# Patient Record
Sex: FEMALE | Race: WHITE | NOT HISPANIC OR LATINO | URBAN - METROPOLITAN AREA
[De-identification: names, ages, dates, MRNs, and addresses within clinical notes are randomized per-mention and may not be internally consistent; named-entity substitution may affect disease eponyms.]

---

## 2023-04-04 LAB
POC CREATININE: 0.7 MG/DL (ref 0.6–1.3)
POCT GFR - DATA CONVERSION: >60

## 2023-09-09 PROBLEM — R59.0 MEDIASTINAL LYMPHADENOPATHY: Status: ACTIVE | Noted: 2023-09-09

## 2023-09-09 PROBLEM — S09.90XA INJURY OF HEAD: Status: ACTIVE | Noted: 2023-09-09

## 2023-09-09 PROBLEM — K21.9 GASTROESOPHAGEAL REFLUX DISEASE WITHOUT ESOPHAGITIS: Status: ACTIVE | Noted: 2023-09-09

## 2023-09-09 PROBLEM — S22.39XA FRACTURE OF RIB: Status: ACTIVE | Noted: 2023-09-09

## 2023-09-09 PROBLEM — R74.01 TRANSAMINITIS: Status: ACTIVE | Noted: 2023-09-09

## 2023-09-09 PROBLEM — R10.9 ABDOMINAL PAIN: Status: ACTIVE | Noted: 2023-09-09

## 2023-09-09 PROBLEM — S72.002A CLOSED FRACTURE OF NECK OF LEFT FEMUR (MULTI): Status: ACTIVE | Noted: 2023-09-09

## 2023-09-09 PROBLEM — F10.929 ALCOHOL INTOXICATION (CMS-HCC): Status: ACTIVE | Noted: 2023-09-09
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
## 2023-09-09 PROBLEM — F33.2 MAJOR DEPRESSIVE DISORDER, RECURRENT SEVERE WITHOUT PSYCHOTIC FEATURES (MULTI): Status: ACTIVE | Noted: 2023-09-09

## 2023-09-09 PROBLEM — K65.1 INTRA-ABDOMINAL ABSCESS (MULTI): Status: ACTIVE | Noted: 2023-09-09

## 2023-09-09 PROBLEM — F32.A DEPRESSION: Status: ACTIVE | Noted: 2023-09-09

## 2023-09-09 PROBLEM — R17 JAUNDICE: Status: ACTIVE | Noted: 2023-09-09

## 2023-09-09 PROBLEM — Z98.890 S/P ERCP: Status: ACTIVE | Noted: 2023-09-09

## 2023-09-09 PROBLEM — G43.019 INTRACTABLE MIGRAINE WITHOUT AURA AND WITHOUT STATUS MIGRAINOSUS: Status: ACTIVE | Noted: 2023-09-09

## 2023-09-09 PROBLEM — R40.1 CLOUDED CONSCIOUSNESS: Status: ACTIVE | Noted: 2023-09-09

## 2023-09-09 PROBLEM — K65.9 PERITONITIS (MULTI): Status: ACTIVE | Noted: 2023-09-09

## 2023-09-09 PROBLEM — K83.1 BILIARY STRICTURE (CMS-HCC): Status: ACTIVE | Noted: 2023-09-09

## 2023-09-09 PROBLEM — K21.9 GASTROESOPHAGEAL REFLUX DISEASE: Status: ACTIVE | Noted: 2023-09-09

## 2023-09-09 PROBLEM — R06.00 DYSPNEA: Status: ACTIVE | Noted: 2023-09-09

## 2023-09-09 PROBLEM — M10.9 GOUTY ARTHROPATHY: Status: ACTIVE | Noted: 2023-09-09

## 2023-09-09 PROBLEM — K90.81: Status: ACTIVE | Noted: 2023-09-09

## 2023-09-09 PROBLEM — S72.002D: Status: ACTIVE | Noted: 2023-09-09

## 2023-09-09 PROBLEM — H25.10 NUCLEAR SENILE CATARACT: Status: ACTIVE | Noted: 2023-09-09

## 2023-09-09 PROBLEM — R53.1 ASTHENIA: Status: ACTIVE | Noted: 2023-09-09

## 2023-09-09 PROBLEM — K86.0 ALCOHOL-INDUCED CHRONIC PANCREATITIS (MULTI): Status: ACTIVE | Noted: 2023-09-09

## 2023-09-09 PROBLEM — R11.10 CHRONIC VOMITING: Status: ACTIVE | Noted: 2023-09-09

## 2023-09-09 PROBLEM — S06.0XAA CONCUSSION: Status: ACTIVE | Noted: 2023-09-09

## 2023-09-09 PROBLEM — G62.9 PERIPHERAL NEUROPATHY: Status: ACTIVE | Noted: 2023-09-09

## 2023-09-09 PROBLEM — Z91.89 AT RISK FOR BLEEDING: Status: ACTIVE | Noted: 2023-09-09

## 2023-09-09 PROBLEM — F33.1 MODERATE EPISODE OF RECURRENT MAJOR DEPRESSIVE DISORDER (MULTI): Status: ACTIVE | Noted: 2023-09-09

## 2023-09-09 PROBLEM — M25.559 HIP PAIN: Status: ACTIVE | Noted: 2023-09-09

## 2023-09-09 PROBLEM — K75.0 LIVER ABSCESS (HHS-HCC): Status: ACTIVE | Noted: 2023-09-09

## 2023-09-09 PROBLEM — M54.50 LOW BACK PAIN: Status: ACTIVE | Noted: 2023-09-09

## 2023-09-09 PROBLEM — E86.0 DEHYDRATION: Status: ACTIVE | Noted: 2023-09-09

## 2023-09-09 PROBLEM — G43.001 MIGRAINE WITHOUT AURA AND WITH STATUS MIGRAINOSUS, NOT INTRACTABLE: Status: ACTIVE | Noted: 2023-09-09

## 2023-09-09 PROBLEM — F10.930 ALCOHOL WITHDRAWAL SYNDROME WITHOUT COMPLICATION (MULTI): Status: ACTIVE | Noted: 2023-09-09

## 2023-09-09 PROBLEM — M81.0 AGE-RELATED OSTEOPOROSIS WITHOUT CURRENT PATHOLOGICAL FRACTURE: Status: ACTIVE | Noted: 2023-09-09

## 2023-09-09 PROBLEM — S72.009A FRACTURE OF NECK OF FEMUR (MULTI): Status: ACTIVE | Noted: 2023-09-09

## 2023-09-09 PROBLEM — S42.309A FRACTURE OF HUMERUS: Status: ACTIVE | Noted: 2023-09-09

## 2023-09-09 PROBLEM — K90.9 INTESTINAL MALABSORPTION, UNSPECIFIED (HHS-HCC): Status: ACTIVE | Noted: 2023-09-09

## 2023-09-09 PROBLEM — F51.01 PRIMARY INSOMNIA: Status: ACTIVE | Noted: 2023-09-09

## 2023-09-09 PROBLEM — S39.91XA GROIN INJURY: Status: ACTIVE | Noted: 2023-09-09

## 2023-09-09 PROBLEM — R87.810 CERVICAL HIGH RISK HUMAN PAPILLOMAVIRUS (HPV) DNA TEST POSITIVE: Status: ACTIVE | Noted: 2023-09-09

## 2023-09-09 PROBLEM — M75.51 BURSITIS OF RIGHT SHOULDER: Status: ACTIVE | Noted: 2023-09-09

## 2023-09-09 PROBLEM — K76.82 HEPATIC ENCEPHALOPATHY (MULTI): Status: ACTIVE | Noted: 2023-09-09

## 2023-09-09 PROBLEM — E27.9 DISORDER OF ADRENAL GLAND, UNSPECIFIED (MULTI): Status: ACTIVE | Noted: 2023-09-09

## 2023-09-09 PROBLEM — F17.200 TOBACCO DEPENDENCE SYNDROME: Status: ACTIVE | Noted: 2023-09-09

## 2023-09-09 PROBLEM — E87.6 HYPOKALEMIA: Status: ACTIVE | Noted: 2023-09-09

## 2023-09-09 PROBLEM — J44.9 COPD, MODERATE (MULTI): Status: ACTIVE | Noted: 2023-09-09

## 2023-09-09 PROBLEM — K83.1: Status: ACTIVE | Noted: 2023-09-09

## 2023-09-09 PROBLEM — I10 PRIMARY HYPERTENSION: Status: ACTIVE | Noted: 2023-09-09

## 2023-09-09 PROBLEM — R26.2 DIFFICULTY IN WALKING: Status: ACTIVE | Noted: 2023-09-09

## 2023-09-09 PROBLEM — E83.42 HYPOMAGNESEMIA: Status: ACTIVE | Noted: 2023-09-09

## 2023-09-09 PROBLEM — M25.511 PAIN IN JOINT OF RIGHT SHOULDER: Status: ACTIVE | Noted: 2023-09-09

## 2023-09-09 PROBLEM — G47.00 INSOMNIA: Status: ACTIVE | Noted: 2023-09-09

## 2023-09-09 PROBLEM — K83.1 CHOLESTASIS (CMS-HCC): Status: ACTIVE | Noted: 2023-09-09

## 2023-09-09 PROBLEM — E78.5 HYPERLIPIDEMIA: Status: ACTIVE | Noted: 2023-09-09

## 2023-09-09 PROBLEM — K83.1 COMMON BILE DUCT OBSTRUCTION (CMS-HCC): Status: ACTIVE | Noted: 2022-11-08

## 2023-09-09 PROBLEM — K86.1 CHRONIC PANCREATITIS (MULTI): Status: ACTIVE | Noted: 2023-09-09

## 2023-09-09 PROBLEM — M12.811 ROTATOR CUFF ARTHROPATHY OF RIGHT SHOULDER: Status: ACTIVE | Noted: 2023-09-09

## 2023-09-09 PROBLEM — S22.31XA CLOSED FRACTURE OF ONE RIB OF RIGHT SIDE: Status: ACTIVE | Noted: 2023-09-09

## 2023-09-09 PROBLEM — Z92.89 S/P ALCOHOL DETOXIFICATION: Status: ACTIVE | Noted: 2023-09-09

## 2023-09-09 PROBLEM — D72.829 LEUKOCYTOSIS: Status: ACTIVE | Noted: 2023-09-09

## 2023-09-09 PROBLEM — F10.10 ALCOHOL ABUSE: Status: ACTIVE | Noted: 2023-09-09

## 2023-09-09 PROBLEM — F17.210 CIGARETTE SMOKER: Status: ACTIVE | Noted: 2023-09-09

## 2023-09-09 PROBLEM — E53.8 B12 DEFICIENCY: Status: ACTIVE | Noted: 2023-09-09

## 2023-09-09 PROBLEM — M15.0 PRIMARY OSTEOARTHRITIS INVOLVING MULTIPLE JOINTS: Status: ACTIVE | Noted: 2023-09-09

## 2023-09-09 PROBLEM — K76.89 LIVER NODULE: Status: ACTIVE | Noted: 2023-09-09

## 2023-09-09 PROBLEM — F10.21 ALCOHOL DEPENDENCE, IN REMISSION (MULTI): Status: ACTIVE | Noted: 2023-09-09

## 2023-09-09 PROBLEM — L29.9 PRURITUS: Status: ACTIVE | Noted: 2023-09-09

## 2023-09-09 PROBLEM — R79.89 ABNORMAL LIVER FUNCTION TESTS: Status: ACTIVE | Noted: 2023-09-09

## 2023-09-09 PROBLEM — R63.0 ANOREXIA: Status: ACTIVE | Noted: 2023-09-09

## 2023-09-09 PROBLEM — J96.20 ACUTE-ON-CHRONIC RESPIRATORY FAILURE (MULTI): Status: ACTIVE | Noted: 2023-09-09

## 2023-09-09 PROBLEM — R07.81 RIB PAIN ON RIGHT SIDE: Status: ACTIVE | Noted: 2023-09-09

## 2023-09-09 PROBLEM — M10.9 GOUT: Status: ACTIVE | Noted: 2023-09-09

## 2023-09-09 PROBLEM — F41.9 ANXIETY: Status: ACTIVE | Noted: 2023-09-09

## 2023-09-09 PROBLEM — R17 ELEVATED BILIRUBIN: Status: ACTIVE | Noted: 2023-09-09

## 2023-09-09 PROBLEM — L29.9 PRURITIC DISORDER: Status: ACTIVE | Noted: 2023-09-09

## 2023-09-09 PROBLEM — R52 GENERALIZED PAIN: Status: ACTIVE | Noted: 2023-09-09

## 2023-09-09 PROBLEM — M15.9 PRIMARY OSTEOARTHRITIS INVOLVING MULTIPLE JOINTS: Status: ACTIVE | Noted: 2023-09-09

## 2023-09-09 PROBLEM — G47.33 OBSTRUCTIVE SLEEP APNEA SYNDROME: Status: ACTIVE | Noted: 2023-09-09

## 2023-09-09 RX ORDER — MIRTAZAPINE 15 MG/1
TABLET, FILM COATED ORAL
COMMUNITY
Start: 2023-01-30 | End: 2023-10-12 | Stop reason: SDUPTHER

## 2023-09-09 RX ORDER — POTASSIUM CHLORIDE 20 MEQ/1
20 TABLET, EXTENDED RELEASE ORAL 2 TIMES DAILY
COMMUNITY
End: 2023-10-12 | Stop reason: SDUPTHER

## 2023-09-09 RX ORDER — MULTIVITAMIN
TABLET ORAL
COMMUNITY
Start: 2013-05-16 | End: 2023-10-12 | Stop reason: ALTCHOICE

## 2023-09-09 RX ORDER — PAROXETINE HYDROCHLORIDE 40 MG/1
1 TABLET, FILM COATED ORAL DAILY
COMMUNITY
Start: 2021-10-01 | End: 2023-10-12 | Stop reason: SDUPTHER

## 2023-09-09 RX ORDER — ACETAMINOPHEN 500 MG
1 TABLET ORAL DAILY
COMMUNITY

## 2023-09-09 RX ORDER — PANCRELIPASE 24000; 76000; 120000 [USP'U]/1; [USP'U]/1; [USP'U]/1
CAPSULE, DELAYED RELEASE PELLETS ORAL
COMMUNITY
Start: 2020-03-06 | End: 2023-10-18

## 2023-09-09 RX ORDER — PANCRELIPASE 60000; 12000; 38000 [USP'U]/1; [USP'U]/1; [USP'U]/1
CAPSULE, DELAYED RELEASE PELLETS ORAL
COMMUNITY
Start: 2020-09-12

## 2023-09-09 RX ORDER — BUTALB/ACETAMINOPHEN/CAFFEINE 50-325-40
TABLET ORAL
COMMUNITY
Start: 2020-10-19

## 2023-09-09 RX ORDER — HYDROXYZINE HYDROCHLORIDE 25 MG/1
TABLET, FILM COATED ORAL
COMMUNITY
Start: 2021-09-17

## 2023-09-09 RX ORDER — PANTOPRAZOLE SODIUM 40 MG/1
TABLET, DELAYED RELEASE ORAL
COMMUNITY
Start: 2021-01-15 | End: 2023-10-12 | Stop reason: SDUPTHER

## 2023-09-09 RX ORDER — ONDANSETRON 4 MG/1
TABLET, FILM COATED ORAL
COMMUNITY
End: 2023-10-12 | Stop reason: ALTCHOICE

## 2023-09-09 RX ORDER — LANOLIN ALCOHOL/MO/W.PET/CERES
CREAM (GRAM) TOPICAL
COMMUNITY
Start: 2019-04-01 | End: 2023-10-12 | Stop reason: SDUPTHER

## 2023-09-09 RX ORDER — TRAZODONE HYDROCHLORIDE 100 MG/1
300 TABLET ORAL DAILY
COMMUNITY
Start: 2020-10-08 | End: 2023-10-12 | Stop reason: SDUPTHER

## 2023-09-09 RX ORDER — MIRTAZAPINE 45 MG/1
TABLET, ORALLY DISINTEGRATING ORAL
COMMUNITY
Start: 2021-04-28 | End: 2023-10-12 | Stop reason: ALTCHOICE

## 2023-09-09 RX ORDER — SUCRALFATE 1 G/1
TABLET ORAL
COMMUNITY
Start: 2023-01-30 | End: 2023-10-24

## 2023-10-03 ENCOUNTER — HOSPITAL ENCOUNTER (EMERGENCY)
Facility: HOSPITAL | Age: 68
Discharge: HOME | End: 2023-10-04
Attending: STUDENT IN AN ORGANIZED HEALTH CARE EDUCATION/TRAINING PROGRAM
Payer: COMMERCIAL

## 2023-10-03 ENCOUNTER — APPOINTMENT (OUTPATIENT)
Dept: RADIOLOGY | Facility: HOSPITAL | Age: 68
End: 2023-10-03
Payer: COMMERCIAL

## 2023-10-03 VITALS
BODY MASS INDEX: 16.92 KG/M2 | OXYGEN SATURATION: 98 % | HEIGHT: 60 IN | DIASTOLIC BLOOD PRESSURE: 68 MMHG | SYSTOLIC BLOOD PRESSURE: 139 MMHG | HEART RATE: 62 BPM | TEMPERATURE: 98.6 F | RESPIRATION RATE: 16 BRPM | WEIGHT: 86.2 LBS

## 2023-10-03 DIAGNOSIS — M54.2 ACUTE NECK PAIN: Primary | ICD-10-CM

## 2023-10-03 PROCEDURE — 72040 X-RAY EXAM NECK SPINE 2-3 VW: CPT | Mod: FY

## 2023-10-03 PROCEDURE — 2500000001 HC RX 250 WO HCPCS SELF ADMINISTERED DRUGS (ALT 637 FOR MEDICARE OP): Performed by: STUDENT IN AN ORGANIZED HEALTH CARE EDUCATION/TRAINING PROGRAM

## 2023-10-03 PROCEDURE — A9270 NON-COVERED ITEM OR SERVICE: HCPCS | Mod: GY | Performed by: STUDENT IN AN ORGANIZED HEALTH CARE EDUCATION/TRAINING PROGRAM

## 2023-10-03 PROCEDURE — 99283 EMERGENCY DEPT VISIT LOW MDM: CPT | Performed by: STUDENT IN AN ORGANIZED HEALTH CARE EDUCATION/TRAINING PROGRAM

## 2023-10-03 RX ORDER — CYCLOBENZAPRINE HCL 10 MG
10 TABLET ORAL ONCE
Status: COMPLETED | OUTPATIENT
Start: 2023-10-03 | End: 2023-10-03

## 2023-10-03 RX ORDER — NAPROXEN 250 MG/1
500 TABLET ORAL ONCE
Status: COMPLETED | OUTPATIENT
Start: 2023-10-03 | End: 2023-10-03

## 2023-10-03 RX ADMIN — NAPROXEN 500 MG: 250 TABLET ORAL at 23:35

## 2023-10-03 RX ADMIN — CYCLOBENZAPRINE 10 MG: 10 TABLET, FILM COATED ORAL at 23:34

## 2023-10-03 ASSESSMENT — PAIN DESCRIPTION - ORIENTATION: ORIENTATION: RIGHT

## 2023-10-03 ASSESSMENT — COLUMBIA-SUICIDE SEVERITY RATING SCALE - C-SSRS
1. IN THE PAST MONTH, HAVE YOU WISHED YOU WERE DEAD OR WISHED YOU COULD GO TO SLEEP AND NOT WAKE UP?: NO
6. HAVE YOU EVER DONE ANYTHING, STARTED TO DO ANYTHING, OR PREPARED TO DO ANYTHING TO END YOUR LIFE?: NO
2. HAVE YOU ACTUALLY HAD ANY THOUGHTS OF KILLING YOURSELF?: NO

## 2023-10-03 ASSESSMENT — PAIN DESCRIPTION - PROGRESSION: CLINICAL_PROGRESSION: GRADUALLY WORSENING

## 2023-10-03 ASSESSMENT — PAIN - FUNCTIONAL ASSESSMENT: PAIN_FUNCTIONAL_ASSESSMENT: 0-10

## 2023-10-03 ASSESSMENT — PAIN DESCRIPTION - PAIN TYPE: TYPE: ACUTE PAIN

## 2023-10-03 ASSESSMENT — PAIN DESCRIPTION - LOCATION: LOCATION: NECK

## 2023-10-03 ASSESSMENT — PAIN SCALES - GENERAL
PAINLEVEL_OUTOF10: 8
PAINLEVEL_OUTOF10: 8

## 2023-10-03 ASSESSMENT — PAIN DESCRIPTION - ONSET: ONSET: ONGOING

## 2023-10-03 ASSESSMENT — PAIN DESCRIPTION - FREQUENCY: FREQUENCY: CONSTANT/CONTINUOUS

## 2023-10-03 ASSESSMENT — PAIN DESCRIPTION - DESCRIPTORS: DESCRIPTORS: BURNING

## 2023-10-04 RX ORDER — NAPROXEN 500 MG/1
500 TABLET ORAL
Qty: 20 TABLET | Refills: 0 | Status: SHIPPED | OUTPATIENT
Start: 2023-10-04 | End: 2023-10-19

## 2023-10-04 RX ORDER — CYCLOBENZAPRINE HCL 10 MG
10 TABLET ORAL 2 TIMES DAILY PRN
Qty: 10 TABLET | Refills: 0 | Status: SHIPPED | OUTPATIENT
Start: 2023-10-04 | End: 2023-10-14

## 2023-10-04 RX ORDER — LIDOCAINE 50 MG/G
1 PATCH TOPICAL DAILY
Qty: 15 PATCH | Refills: 0 | Status: SHIPPED | OUTPATIENT
Start: 2023-10-04 | End: 2023-10-12 | Stop reason: ALTCHOICE

## 2023-10-04 RX ORDER — LIDOCAINE 560 MG/1
1 PATCH PERCUTANEOUS; TOPICAL; TRANSDERMAL ONCE
Status: DISCONTINUED | OUTPATIENT
Start: 2023-10-04 | End: 2023-10-04

## 2023-10-04 RX ORDER — FENTANYL CITRATE 50 UG/ML
25 INJECTION, SOLUTION INTRAMUSCULAR; INTRAVENOUS ONCE
Status: DISCONTINUED | OUTPATIENT
Start: 2023-10-04 | End: 2023-10-04

## 2023-10-04 NOTE — ED PROVIDER NOTES
HPI   Chief Complaint   Patient presents with    Neck Pain     Pt had sudden onset neck pain 3 days ago. No known trauma. Right side neck pain.       68 year old female presents with acute neck pain.  Patient states the pain began approximately 3 days ago while at rest.  Pain is localized to her right side, nonradiating.  Denies weakness or numbness to her extremities.  Denies headache, facial pain.  No recent trauma.  No prior history of surgical intervention to her neck.  No prior history of chronic neck pain.  Has attempted to alleviate her pain with Tylenol and Motrin at home.                          Rich Coma Scale Score: 15                  Patient History   No past medical history on file.  Past Surgical History:   Procedure Laterality Date    BREAST LUMPECTOMY  05/24/2013    Breast Surgery Lumpectomy    CT GUIDED PERCUTANEOUS BIOPSY MEDIASTINUM  6/24/2022    CT GUIDED PERCUTANEOUS BIOPSY MEDIASTINUM 6/24/2022 [facility]    US GUIDED ABSCESS FLUID COLLECTION DRAINAGE  6/6/2022    US GUIDED ABSCESS FLUID COLLECTION DRAINAGE 6/6/2022 [facility]    US GUIDED ABSCESS FLUID COLLECTION DRAINAGE  6/6/2022    US GUIDED ABSCESS FLUID COLLECTION DRAINAGE 6/6/2022 [facility]     Family History   Problem Relation Name Age of Onset    Heart disease Mother  78    Hypertension Mother      Heart disease Father      Other (pacemaker) Father      Coronary artery disease Brother  55    Other (stent pacement) Brother       Social History     Tobacco Use    Smoking status: Not on file    Smokeless tobacco: Not on file   Substance Use Topics    Alcohol use: Not on file    Drug use: Not on file       Physical Exam   ED Triage Vitals [10/03/23 2025]   Temp Heart Rate Resp BP   37 °C (98.6 °F) 62 16 139/68      SpO2 Temp Source Heart Rate Source Patient Position   95 % Oral -- Sitting      BP Location FiO2 (%)     Right arm --       Physical Exam  Vitals and nursing note reviewed.   Constitutional:        General: She is not in acute distress.     Appearance: She is underweight. She is not ill-appearing.   HENT:      Head: Normocephalic and atraumatic.      Mouth/Throat:      Mouth: Mucous membranes are moist.      Pharynx: Oropharynx is clear.   Eyes:      Extraocular Movements: Extraocular movements intact.      Conjunctiva/sclera: Conjunctivae normal.      Pupils: Pupils are equal, round, and reactive to light.   Neck:      Comments: Range of motion limited secondary to pain, no midline tenderness to palpation, no step-offs, right-sided paraspinal tenderness to palpation.  No palpable lymphadenopathy  Cardiovascular:      Rate and Rhythm: Normal rate and regular rhythm.   Pulmonary:      Effort: Pulmonary effort is normal. No respiratory distress.      Breath sounds: Normal breath sounds.   Abdominal:      General: There is no distension.      Palpations: Abdomen is soft.      Tenderness: There is no abdominal tenderness.   Musculoskeletal:         General: No swelling or deformity. Normal range of motion.      Cervical back: Neck supple.   Skin:     General: Skin is warm and dry.      Capillary Refill: Capillary refill takes less than 2 seconds.   Neurological:      General: No focal deficit present.      Mental Status: She is alert and oriented to person, place, and time. Mental status is at baseline.   Psychiatric:         Mood and Affect: Mood normal.         Behavior: Behavior normal.         ED Course & MDM   ED Course as of 10/04/23 0139   Wed Oct 04, 2023   0131 Reassessed patient again. On my reassessment patient was continuing to have pain.  Additional imaging and medication ordered.  Was notified that the patient's ride was here in the emergency department to pick her up.  Spoke with the patient again and explained plan for additional imaging and additional medication.  Patient states she prefers to go home with prescription pain medication and will return to the emergency department if she has  worsening pain or new weakness or numbness in her upper extremities.  Imaging and medication canceled. [JM]      ED Course User Index  [JM] Iris Garcia MD         Diagnoses as of 10/04/23 0139   Acute neck pain       Medical Decision Making  68-year-old female presents with acute back pain.  Considered fracture, strain, dislocation, nerve impingement, spinal cord injury.  Patient without history of trauma, denies recent MVC's or falls.  No paresthesias on examination.  No neurological deficit, 5/5 strength to bilateral upper extremities.  X-ray obtained of neck, no fracture seen however degenerative changes noted throughout.  Attempted to control pain with oral medication, however patient states minimal change in pain intensity.  Offered additional imaging as well as additional medication however patient at this time would prefer to go home with Rx pain medication.  Patient provided with instructions to return to the ED if she notes weakness to her upper extremities, numbness to her upper extremities, worsening neck pain, or high fevers.    Amount and/or Complexity of Data Reviewed  Radiology: ordered and independent interpretation performed.        Procedure  Procedures     Iris Garcia MD  10/04/23 0142

## 2023-10-04 NOTE — ED NOTES
Patient is at the ED for evaluation for right sided  neck pain which has been going on for the past 4 days.nothing seems to be getting better,it is worse on moving the neck.     Leonel Soria RN  10/03/23 [id number]

## 2023-10-12 ENCOUNTER — OFFICE VISIT (OUTPATIENT)
Dept: PRIMARY CARE | Facility: CLINIC | Age: 68
End: 2023-10-12
Payer: COMMERCIAL

## 2023-10-12 VITALS
SYSTOLIC BLOOD PRESSURE: 122 MMHG | BODY MASS INDEX: 18.26 KG/M2 | HEART RATE: 60 BPM | DIASTOLIC BLOOD PRESSURE: 62 MMHG | HEIGHT: 60 IN | RESPIRATION RATE: 16 BRPM | WEIGHT: 93 LBS | OXYGEN SATURATION: 96 %

## 2023-10-12 DIAGNOSIS — F33.9 EPISODE OF RECURRENT MAJOR DEPRESSIVE DISORDER, UNSPECIFIED DEPRESSION EPISODE SEVERITY (CMS-HCC): ICD-10-CM

## 2023-10-12 DIAGNOSIS — Z87.891 PERSONAL HISTORY OF NICOTINE DEPENDENCE: ICD-10-CM

## 2023-10-12 DIAGNOSIS — F41.9 ANXIETY: ICD-10-CM

## 2023-10-12 DIAGNOSIS — M54.12 CERVICAL RADICULOPATHY: Primary | ICD-10-CM

## 2023-10-12 DIAGNOSIS — K21.9 GASTROESOPHAGEAL REFLUX DISEASE WITHOUT ESOPHAGITIS: ICD-10-CM

## 2023-10-12 DIAGNOSIS — F51.01 PRIMARY INSOMNIA: ICD-10-CM

## 2023-10-12 DIAGNOSIS — E83.42 HYPOMAGNESEMIA: ICD-10-CM

## 2023-10-12 DIAGNOSIS — E87.6 HYPOKALEMIA: ICD-10-CM

## 2023-10-12 PROCEDURE — 99214 OFFICE O/P EST MOD 30 MIN: CPT | Performed by: INTERNAL MEDICINE

## 2023-10-12 PROCEDURE — 3078F DIAST BP <80 MM HG: CPT | Performed by: INTERNAL MEDICINE

## 2023-10-12 PROCEDURE — 3074F SYST BP LT 130 MM HG: CPT | Performed by: INTERNAL MEDICINE

## 2023-10-12 PROCEDURE — 1159F MED LIST DOCD IN RCRD: CPT | Performed by: INTERNAL MEDICINE

## 2023-10-12 PROCEDURE — 1126F AMNT PAIN NOTED NONE PRSNT: CPT | Performed by: INTERNAL MEDICINE

## 2023-10-12 PROCEDURE — 4004F PT TOBACCO SCREEN RCVD TLK: CPT | Performed by: INTERNAL MEDICINE

## 2023-10-12 PROCEDURE — 3008F BODY MASS INDEX DOCD: CPT | Performed by: INTERNAL MEDICINE

## 2023-10-12 RX ORDER — MIRTAZAPINE 45 MG/1
TABLET, ORALLY DISINTEGRATING ORAL
Qty: 90 TABLET | Refills: 3 | Status: CANCELLED | OUTPATIENT
Start: 2023-10-12

## 2023-10-12 RX ORDER — SUCRALFATE 1 G/1
TABLET ORAL
Qty: 180 TABLET | Refills: 3 | Status: CANCELLED | OUTPATIENT
Start: 2023-10-12

## 2023-10-12 RX ORDER — HYDROXYZINE HYDROCHLORIDE 25 MG/1
25 TABLET, FILM COATED ORAL EVERY 8 HOURS PRN
Qty: 90 TABLET | Refills: 3 | Status: CANCELLED | OUTPATIENT
Start: 2023-10-12 | End: 2024-10-11

## 2023-10-12 RX ORDER — ONDANSETRON 4 MG/1
TABLET, FILM COATED ORAL
Qty: 20 TABLET | Refills: 3 | Status: CANCELLED | OUTPATIENT
Start: 2023-10-12

## 2023-10-12 RX ORDER — POTASSIUM CHLORIDE 20 MEQ/1
20 TABLET, EXTENDED RELEASE ORAL DAILY
Qty: 90 TABLET | Refills: 3 | Status: SHIPPED | OUTPATIENT
Start: 2023-10-12 | End: 2024-10-11

## 2023-10-12 RX ORDER — MIRTAZAPINE 15 MG/1
TABLET, FILM COATED ORAL
Qty: 90 TABLET | Refills: 3 | Status: SHIPPED | OUTPATIENT
Start: 2023-10-12

## 2023-10-12 RX ORDER — PANTOPRAZOLE SODIUM 40 MG/1
TABLET, DELAYED RELEASE ORAL
Qty: 90 TABLET | Refills: 3 | Status: SHIPPED | OUTPATIENT
Start: 2023-10-12 | End: 2024-02-14

## 2023-10-12 RX ORDER — TRAZODONE HYDROCHLORIDE 100 MG/1
300 TABLET ORAL DAILY
Qty: 270 TABLET | Refills: 3 | Status: SHIPPED | OUTPATIENT
Start: 2023-10-12 | End: 2024-01-09 | Stop reason: SDUPTHER

## 2023-10-12 RX ORDER — LANOLIN ALCOHOL/MO/W.PET/CERES
CREAM (GRAM) TOPICAL
Qty: 180 TABLET | Refills: 3 | Status: SHIPPED | OUTPATIENT
Start: 2023-10-12

## 2023-10-12 RX ORDER — PAROXETINE HYDROCHLORIDE 40 MG/1
40 TABLET, FILM COATED ORAL DAILY
Qty: 90 TABLET | Refills: 3 | Status: SHIPPED | OUTPATIENT
Start: 2023-10-12 | End: 2024-10-11

## 2023-10-12 ASSESSMENT — ENCOUNTER SYMPTOMS
NAUSEA: 0
JOINT SWELLING: 0
RHINORRHEA: 0
FATIGUE: 0
FEVER: 0
APPETITE CHANGE: 0
ARTHRALGIAS: 0
HEADACHES: 0
OCCASIONAL FEELINGS OF UNSTEADINESS: 0
DEPRESSION: 0
WEAKNESS: 0
SLEEP DISTURBANCE: 0
DIARRHEA: 1
VOMITING: 0
ABDOMINAL PAIN: 0
NERVOUS/ANXIOUS: 0
COUGH: 0
LOSS OF SENSATION IN FEET: 0
PALPITATIONS: 0
HEMATURIA: 0
SORE THROAT: 0
FREQUENCY: 0
NECK PAIN: 1
CONSTIPATION: 0
DIZZINESS: 0
DIFFICULTY URINATING: 0
CHILLS: 0
SINUS PAIN: 0
SHORTNESS OF BREATH: 0

## 2023-10-12 ASSESSMENT — PATIENT HEALTH QUESTIONNAIRE - PHQ9
SUM OF ALL RESPONSES TO PHQ QUESTIONS 1-9: 9
3. TROUBLE FALLING OR STAYING ASLEEP OR SLEEPING TOO MUCH: MORE THAN HALF THE DAYS
1. LITTLE INTEREST OR PLEASURE IN DOING THINGS: SEVERAL DAYS
SUM OF ALL RESPONSES TO PHQ9 QUESTIONS 1 AND 2: 3
6. FEELING BAD ABOUT YOURSELF - OR THAT YOU ARE A FAILURE OR HAVE LET YOURSELF OR YOUR FAMILY DOWN: NOT AT ALL
5. POOR APPETITE OR OVEREATING: NOT AT ALL
8. MOVING OR SPEAKING SO SLOWLY THAT OTHER PEOPLE COULD HAVE NOTICED. OR THE OPPOSITE, BEING SO FIGETY OR RESTLESS THAT YOU HAVE BEEN MOVING AROUND A LOT MORE THAN USUAL: NOT AT ALL
4. FEELING TIRED OR HAVING LITTLE ENERGY: NEARLY EVERY DAY
2. FEELING DOWN, DEPRESSED OR HOPELESS: MORE THAN HALF THE DAYS
10. IF YOU CHECKED OFF ANY PROBLEMS, HOW DIFFICULT HAVE THESE PROBLEMS MADE IT FOR YOU TO DO YOUR WORK, TAKE CARE OF THINGS AT HOME, OR GET ALONG WITH OTHER PEOPLE: NOT DIFFICULT AT ALL
9. THOUGHTS THAT YOU WOULD BE BETTER OFF DEAD, OR OF HURTING YOURSELF: NOT AT ALL
7. TROUBLE CONCENTRATING ON THINGS, SUCH AS READING THE NEWSPAPER OR WATCHING TELEVISION: SEVERAL DAYS

## 2023-10-12 ASSESSMENT — PAIN SCALES - GENERAL: PAINLEVEL: 9

## 2023-10-12 NOTE — PROGRESS NOTES
Subjective   Patient ID: Jodi Johnson is a 68 y.o. female who presents for follow up after ER visit for neck pain. The pain occurs on her R side and radiates across her neck and occ to her hands. C/o leg weakness. Sees ophthalmologist regularly. She is getting new dentures next week. Patient reports smoking 1 ppd and is trying to cut down.      Review of Systems   Constitutional:  Negative for appetite change, chills, fatigue and fever.   HENT:  Negative for ear pain, rhinorrhea, sinus pain and sore throat.    Eyes:  Negative for visual disturbance.   Respiratory:  Negative for cough and shortness of breath.    Cardiovascular:  Negative for chest pain and palpitations.   Gastrointestinal:  Positive for diarrhea (occ). Negative for abdominal pain, constipation, nausea and vomiting.   Genitourinary:  Negative for difficulty urinating, frequency and hematuria.   Musculoskeletal:  Positive for neck pain. Negative for arthralgias and joint swelling.        Leg weakness   Skin:  Negative for rash.   Neurological:  Negative for dizziness, weakness and headaches.   Psychiatric/Behavioral:  Negative for sleep disturbance. The patient is not nervous/anxious.        Objective   /62   Pulse 60   Resp 16   Ht 1.524 m (5')   Wt (!) 42.2 kg (93 lb)   SpO2 96%   BMI 18.16 kg/m²     Physical Exam  HENT:      Right Ear: Tympanic membrane normal.      Left Ear: Tympanic membrane normal.      Mouth/Throat:      Pharynx: Oropharynx is clear. No oropharyngeal exudate.   Eyes:      Conjunctiva/sclera: Conjunctivae normal.      Pupils: Pupils are equal, round, and reactive to light.   Neck:      Thyroid: No thyromegaly.      Vascular: No carotid bruit.   Cardiovascular:      Rate and Rhythm: Regular rhythm.      Heart sounds: Normal heart sounds.   Pulmonary:      Breath sounds: Normal breath sounds.   Musculoskeletal:      Cervical back: Tenderness (with ROM and palpation) present.   Skin:     General: Skin is warm.    Neurological:      Mental Status: She is alert and oriented to person, place, and time.      Cranial Nerves: Cranial nerves 2-12 are intact.      Motor: Motor function is intact.      Coordination: Coordination is intact.   Psychiatric:         Mood and Affect: Mood and affect normal.         Behavior: Behavior normal. Behavior is cooperative.         Cognition and Memory: Cognition normal.     Diagnostics Reviewed: 2/2023 mammogram nml. 2/2023 DEXA: osteoporosis. 2017 colonoscopy, due in 2027.  Labs Reviewed: 7/2023 cholesterol nml, B12 nml, TSH nml      Assessment/Plan   Diagnoses and all orders for this visit:  Cervical radiculopathy  -     Referral to Physical Therapy; Future  Anxiety  -     mirtazapine (Remeron) 15 mg tablet; 1/2 tablet at bedtime Orally Once a day for 90 day(s)  Hypomagnesemia  -     magnesium oxide (Mag-Ox) 400 mg (241.3 mg magnesium) tablet; 1 tablet Orally twice a day  Gastroesophageal reflux disease without esophagitis  -     pantoprazole (ProtoNix) 40 mg EC tablet; 1 tablet Orally Once a day for 90 day(s)  -     CBC and Auto Differential; Future  -     Comprehensive Metabolic Panel; Future  Episode of recurrent major depressive disorder, unspecified depression episode severity (CMS/HCC)  -     PARoxetine (Paxil) 40 mg tablet; Take 1 tablet (40 mg) by mouth once daily.  Hypokalemia  -     potassium chloride CR 20 mEq ER tablet; Take 1 tablet (20 mEq) by mouth once daily.  Primary insomnia  -     traZODone (Desyrel) 100 mg tablet; Take 3 tablets (300 mg) by mouth once daily.  Personal history of nicotine dependence  -     CT lung screening low dose; Future      Scribe Attestation  By signing my name below, Cris HAYES, Alejandra attest that this documentation has been prepared under the direction and in the presence of Carlotta Noel MD.

## 2023-10-13 ENCOUNTER — OFFICE VISIT (OUTPATIENT)
Dept: SURGICAL ONCOLOGY | Facility: CLINIC | Age: 68
End: 2023-10-13
Payer: COMMERCIAL

## 2023-10-13 VITALS
WEIGHT: 90.17 LBS | TEMPERATURE: 97.7 F | OXYGEN SATURATION: 96 % | SYSTOLIC BLOOD PRESSURE: 113 MMHG | BODY MASS INDEX: 17.61 KG/M2 | DIASTOLIC BLOOD PRESSURE: 67 MMHG | RESPIRATION RATE: 18 BRPM | HEART RATE: 76 BPM

## 2023-10-13 DIAGNOSIS — K86.1 CHRONIC CALCIFIC PANCREATITIS (MULTI): Primary | ICD-10-CM

## 2023-10-13 PROCEDURE — 3078F DIAST BP <80 MM HG: CPT | Performed by: SURGERY

## 2023-10-13 PROCEDURE — 4004F PT TOBACCO SCREEN RCVD TLK: CPT | Performed by: SURGERY

## 2023-10-13 PROCEDURE — 3074F SYST BP LT 130 MM HG: CPT | Performed by: SURGERY

## 2023-10-13 PROCEDURE — 3008F BODY MASS INDEX DOCD: CPT | Performed by: SURGERY

## 2023-10-13 PROCEDURE — 1159F MED LIST DOCD IN RCRD: CPT | Performed by: SURGERY

## 2023-10-13 PROCEDURE — 99213 OFFICE O/P EST LOW 20 MIN: CPT | Performed by: SURGERY

## 2023-10-13 PROCEDURE — 1126F AMNT PAIN NOTED NONE PRSNT: CPT | Performed by: SURGERY

## 2023-10-13 ASSESSMENT — PAIN SCALES - GENERAL: PAINLEVEL: 0-NO PAIN

## 2023-10-13 NOTE — PROGRESS NOTES
Chief complaint:  Follow-up chronic pancreatitis s/p Partington-Naida procedure    HPI:  This is a 68 y.o. female who presents with alcoholic pancreatitis. History as follows:  Obstructing stones in the pancreatic head. Has undergone multiple ERCPs since 2021, stones are not clearable.    Has daily pain that waxes and wanes. No clear exacerbating or alleviating factors.  No EtOH x 1yr. Still smokes around 1ppd.    Additionally, has a chronic CBD stricture. Most recently had stents taken out in 2/2023.    OR 4/27 for attempted Whipple. No safe tissue planes, plan changed to drainage procedure (Partington-Naida/lateral PJ) with extraction of multiple intrapancreatic duct stones. Postop course unremarkable, home on POD6.    Last visit with me on 6/9/2023. She was gaining some weight at that time and had improvement in her pain. Was still smoking 1ppd.    Interval history:  Pain still minor but very much improved from before surgery. Still smoking 1ppd.     She's eating appropriately. Taking creon as directed.      Visit Vitals  /67   Pulse 76   Temp 36.5 °C (97.7 °F)   Resp 18    41kg today, 40kg last visit    Physical Exam    Appears well, nad  Ewob  S/nt/nd, incision well-healed    Assessment and Plan:  68F with chronic pancreatitis related to EtOH s/p Partington-Naida lateral pancreaticojejunostomy. Her symptoms/pain are improved as compared to preoperatively.     Encouraged her to make a strong attempt at tobacco cessation given she risks blunting long-term success of drainage procedure and precipitating recurrent acute-on-chronic pancreatitis episodes. She says she'll try.    I'll see her in 6 months, around a year from surgery.    A total of 25 minutes were spent on this encounter including chart/imaging review, patient discussion/counseling, and coordination of care.

## 2023-10-16 DIAGNOSIS — K21.9 GASTROESOPHAGEAL REFLUX DISEASE WITHOUT ESOPHAGITIS: Primary | ICD-10-CM

## 2023-10-17 DIAGNOSIS — K86.89 OTHER SPECIFIED DISEASES OF PANCREAS (HHS-HCC): ICD-10-CM

## 2023-10-18 RX ORDER — PANCRELIPASE 24000; 76000; 120000 [USP'U]/1; [USP'U]/1; [USP'U]/1
CAPSULE, DELAYED RELEASE PELLETS ORAL
Qty: 270 CAPSULE | Refills: 3 | Status: SHIPPED | OUTPATIENT
Start: 2023-10-18 | End: 2024-04-03

## 2023-10-24 RX ORDER — SUCRALFATE 1 G/1
TABLET ORAL
Qty: 60 TABLET | Refills: 3 | Status: SHIPPED | OUTPATIENT
Start: 2023-10-24 | End: 2023-11-23

## 2023-11-03 RX ORDER — POTASSIUM CHLORIDE 1500 MG/1
20 TABLET, EXTENDED RELEASE ORAL DAILY
Qty: 30 TABLET | Refills: 1 | OUTPATIENT
Start: 2023-11-03

## 2023-11-06 ENCOUNTER — EVALUATION (OUTPATIENT)
Dept: PHYSICAL THERAPY | Facility: CLINIC | Age: 68
End: 2023-11-06
Payer: COMMERCIAL

## 2023-11-06 DIAGNOSIS — M54.12 CERVICAL RADICULOPATHY: ICD-10-CM

## 2023-11-06 DIAGNOSIS — M54.2 CERVICAL SPINE PAIN: Primary | ICD-10-CM

## 2023-11-06 PROCEDURE — 97110 THERAPEUTIC EXERCISES: CPT | Mod: GP | Performed by: PHYSICAL THERAPIST

## 2023-11-06 PROCEDURE — 97530 THERAPEUTIC ACTIVITIES: CPT | Mod: GP | Performed by: PHYSICAL THERAPIST

## 2023-11-06 PROCEDURE — 97162 PT EVAL MOD COMPLEX 30 MIN: CPT | Mod: GP | Performed by: PHYSICAL THERAPIST

## 2023-11-06 ASSESSMENT — ENCOUNTER SYMPTOMS
DEPRESSION: 1
LOSS OF SENSATION IN FEET: 0
OCCASIONAL FEELINGS OF UNSTEADINESS: 0

## 2023-11-06 NOTE — Clinical Note
November 6, 2023     Patient: Jodi Johnson   YOB: 1955   Date of Visit: 11/6/2023       To Whom It May Concern:    It is my medical opinion that Jodi Johnson {Work release (duty restriction):25356}.    If you have any questions or concerns, please don't hesitate to call.         Sincerely,        Joleen To, PT    CC: No Recipients

## 2023-11-06 NOTE — PROGRESS NOTES
Physical Therapy Evaluation and Treatment      Patient Name: Jodi Johnson  MRN: [id number]  Today's Date: 11/6/2023  Time Calculation  Start Time: 1645  Stop Time: 1724  Time Calculation (min): 39 min  PT Therapeutic Procedures Time Entry  Therapeutic Exercise Time Entry: 14  Therapeutic Activity Time Entry: 11    Current Problem:   1. Cervical spine pain  Follow Up In Physical Therapy      2. Cervical radiculopathy  Referral to Physical Therapy          Subjective    General:  Pt reports she has been having pain in her neck. This began months ago, no specific injury. Pain starts in center and goes out to both sides. Has not found medication/heat/or ice to provide relief. CT scan tomorrow. Pain keeps her up at night, doesn't get much sleep. Turning head in any direction is painful. Plays games a lot on her phone which puts strain on her neck. No headaches       Precautions: None  Pain: 9/10    Objective   ROM   Cervical: Flexion 50%, Extension 25%, Right rotation 25%, Left rotation 25%, Right side bending 25%, Left side bending 25%    High pain with all motions    Bilateral shoulder AROM is WNL.     MMT   Bilateral UE strength:  Sh flex 4-/5  Sh ABD 4-/5  Elbow flex 4/5  Elbow ext 4/5  *Pain with all motions    Dermatomes/Myotomes/Reflexes  Denies numbness/tingling    Palpation  TTP bilateral UT and LS  TTP cervical vertebrae and paraspinals, into suboccipitials.     No edema    Flexibility   Moderate bilateral UT and LS tightness     Special Tests   Cervical: Compression: negative, Distraction: negative   Lat flex with compress R: positive  Lat flex with compress L: positive    Outcome Measures:  Neck Disability Index: 22/50    Treatments:  Therapeutic Exercise:  UT stretch  Palms out stretch  Cervical retraction  Cervical rotation isometrics R/L    Therapeutic Activity:  Educated pt on eval findings and POC  Educated pt on anatomy of spine  Reviewed importance of posture with all activities      Assessment   Pt is  a 68 y.o. female with chronic cervical pain most likely due to stenosis and posture. Pt with decreased ROM, reduced strength, flexibility restrictions, and abnormal posture. Pt will benefit from skilled PT to address the above deficits for improvement in functional activities.   PT Assessment Results: Decreased strength, Decreased range of motion, Decreased endurance, Decreased mobility, Pain  Rehab Prognosis: Good  Evaluation/Treatment Tolerance: Patient limited by pain    Moderate complexity due to patient's clinical presentation being evolving with changing characteristics, with comorbidities to include depression and arthritis, all of which may negatively impact rehab tolerance and progression.     Plan:   Treatment/Interventions: Dry needling, Education/ Instruction, Manual therapy, Neuromuscular re-education, Self care/ home management, Therapeutic activities, Therapeutic exercises  PT Plan: Skilled PT  PT Frequency: 1 time per week  Duration: 5 more visits  Rehab Potential: Good  Plan of Care Agreement: Patient        Goals:   Active       Mobility       Goal 1       Start:  11/06/23    Expected End:  02/04/24       Pt will improve cervical spine AROM to WNL To improve I/ADLs         Goal 2       Start:  11/06/23    Expected End:  02/04/24       Pt will improve bilat UE strength to 5/5 to improve I/ADLs            Pain       Goal 1       Start:  11/06/23    Expected End:  02/04/24       Pt will improve bilat UE flexibility to WNL To improve I/ADLs and decrease pain.         Goal 2       Start:  11/06/23    Expected End:  02/04/24       Pt will perform all housework with <3/10 pain.

## 2023-11-06 NOTE — Clinical Note
November 6, 2023     Patient: Jodi Johnson   YOB: 1955   Date of Visit: 11/6/2023       To Whom it May Concern:    Jodi Johnson was seen in my clinic on 11/6/2023. She {Return to school/sport:54705}.    If you have any questions or concerns, please don't hesitate to call.         Sincerely,          Joleen To, PT        CC: No Recipients

## 2023-11-07 RX ORDER — TRAZODONE HYDROCHLORIDE 100 MG/1
300 TABLET ORAL DAILY
Qty: 270 TABLET | Refills: 2 | OUTPATIENT
Start: 2023-11-07

## 2023-11-07 RX ORDER — ONDANSETRON 4 MG/1
TABLET, FILM COATED ORAL
Qty: 120 TABLET | Refills: 1 | OUTPATIENT
Start: 2023-11-07

## 2023-11-07 RX ORDER — PAROXETINE HYDROCHLORIDE 40 MG/1
40 TABLET, FILM COATED ORAL
Qty: 90 TABLET | Refills: 2 | OUTPATIENT
Start: 2023-11-07

## 2023-11-08 ENCOUNTER — HOSPITAL ENCOUNTER (OUTPATIENT)
Dept: RADIOLOGY | Facility: HOSPITAL | Age: 68
Discharge: HOME | End: 2023-11-08
Payer: COMMERCIAL

## 2023-11-08 DIAGNOSIS — Z87.891 PERSONAL HISTORY OF NICOTINE DEPENDENCE: ICD-10-CM

## 2023-11-08 PROCEDURE — 71271 CT THORAX LUNG CANCER SCR C-: CPT

## 2023-11-14 NOTE — RESULT ENCOUNTER NOTE
CT lung shows small less than 4 mm pulmonary nodules, which look benign, will repeat CT chest in 1 year and she needs to quit smoking

## 2023-11-16 ENCOUNTER — TREATMENT (OUTPATIENT)
Dept: PHYSICAL THERAPY | Facility: CLINIC | Age: 68
End: 2023-11-16
Payer: COMMERCIAL

## 2023-11-16 DIAGNOSIS — M54.2 CERVICAL SPINE PAIN: ICD-10-CM

## 2023-11-16 PROCEDURE — 97110 THERAPEUTIC EXERCISES: CPT | Mod: GP | Performed by: PHYSICAL THERAPIST

## 2023-11-16 NOTE — PROGRESS NOTES
Physical Therapy Treatment    Patient Name: Jodi Johnson  MRN: [id number]  Today's Date: 11/16/2023  Time Calculation  Start Time: 1105  Stop Time: 1145  Time Calculation (min): 40 min  Visit # 2/6    Current Problem   1. Cervical spine pain  Follow Up In Physical Therapy          Subjective   General   Pt states neck is achey today, thinks due to getting minimal sleep last night.     Precautions: None  Pain 8/10  Post Treatment Pain Level same    Objective   Moderate forward head with bilateral protracted scapulae.     Treatments:  UBE retro x5 minutes  L5 bilat flex slides, 2x10  UT towel stretch, 30 seconds x 3 ea R/L   Palms out stretch, 30 seconds x 3   Physio roll out, x2 minutes   Cervical retraction, 2x10   Scapula retraction, 2x10   Wand press, 2x10  Wand curls, 2x10   Wand upright row, 2x10   Cervical rotation, 2x10 ea R/L       Assessment   Pt with good tolerance to there ex progressions without increases in pain or symptoms. Requires constant cues for postural alignment during all exercises.     Plan: ROM and strength. Provide updated HEP at next visit     OP EDUCATION: Posture    Goals:   Active       Mobility       Goal 1       Start:  11/06/23    Expected End:  02/04/24       Pt will improve cervical spine AROM to WNL To improve I/ADLs         Goal 2       Start:  11/06/23    Expected End:  02/04/24       Pt will improve bilat UE strength to 5/5 to improve I/ADLs            Pain       Goal 1       Start:  11/06/23    Expected End:  02/04/24       Pt will improve bilat UE flexibility to WNL To improve I/ADLs and decrease pain.         Goal 2       Start:  11/06/23    Expected End:  02/04/24       Pt will perform all housework with <3/10 pain.

## 2023-11-28 ENCOUNTER — APPOINTMENT (OUTPATIENT)
Dept: PHYSICAL THERAPY | Facility: CLINIC | Age: 68
End: 2023-11-28
Payer: COMMERCIAL

## 2023-11-28 ENCOUNTER — DOCUMENTATION (OUTPATIENT)
Dept: PHYSICAL THERAPY | Facility: CLINIC | Age: 68
End: 2023-11-28
Payer: COMMERCIAL

## 2023-11-28 NOTE — PROGRESS NOTES
Physical Therapy                 Therapy Communication Note    Patient Name: Jodi Johnson  MRN: [id number]  Today's Date: 11/28/2023     Discipline: Physical Therapy    Missed Visit Reason:  weather    Missed Time: Cancel    Comment:

## 2023-12-05 ENCOUNTER — TREATMENT (OUTPATIENT)
Dept: PHYSICAL THERAPY | Facility: CLINIC | Age: 68
End: 2023-12-05
Payer: COMMERCIAL

## 2023-12-05 DIAGNOSIS — M54.2 CERVICAL SPINE PAIN: ICD-10-CM

## 2023-12-05 PROCEDURE — 97110 THERAPEUTIC EXERCISES: CPT | Mod: GP,CQ

## 2023-12-05 ASSESSMENT — PAIN SCALES - GENERAL: PAINLEVEL_OUTOF10: 0 - NO PAIN

## 2023-12-05 ASSESSMENT — PAIN - FUNCTIONAL ASSESSMENT: PAIN_FUNCTIONAL_ASSESSMENT: 0-10

## 2023-12-05 NOTE — PROGRESS NOTES
"Physical Therapy Treatment    Patient Name: Jodi Johnson  MRN: [id number]  Today's Date: 12/5/2023  Time Calculation  Start Time: 1115  Stop Time: 1146  Time Calculation (min): 31 min  PT Therapeutic Procedures Time Entry  Therapeutic Exercise Time Entry: 31  Visit # 3/6    Current Problem   1. Cervical spine pain  Follow Up In Physical Therapy          Subjective   General    Arrives to clinic stating she has no pain upon arrival, reports intermittent pain only with head movements. No headaches reported but does have pain to bilat shoulders and mid back. Has trouble reaching with L shld with radiating pain to top of shoulder and lateral neck.     Precautions:   None     Pain   Pain Assessment: 0-10  Pain Score: 0 - No pain  Post Treatment Pain Level   0/10    Objective   Cervical: Limited R lateral flex    Thoracic: Limited L thoracic rotation    Posture: FFP with rounded shoulders, forward head, and winging to R scapula with L shld elevation.     Treatments:  Therapeutic Exercise  Therapeutic Exercise Performed: Yes  Therapeutic Exercise Activity 1: UBE fwd, bwd 2' each  Therapeutic Exercise Activity 2: thoracic ext stretch @ chair 10x 10\"  Therapeutic Exercise Activity 3: Standing UT stretch with shld distraction 3 x 20\" H B  Therapeutic Exercise Activity 4: seated thoracic rotation (stretch to L 5\" H)x 10  Therapeutic Exercise Activity 5: standing scap retractions 10x2 GTB  Therapeutic Exercise Activity 6: standing scap LAE GTB 10x2       Assessment   Assessment:    Patient tolerated full session well, focus on posture, thoracic rotation and stability, and cervical motion. Good understanding and performance of updated HEP.     Plan:    Posture, scapular strengthening.     OP EDUCATION:   Updated HEP: Access Code: [email or web address]    Goals:   Active       Mobility       Goal 1       Start:  11/06/23    Expected End:  02/04/24       Pt will improve cervical spine AROM to WNL To improve I/ADLs         Goal 2       Start:  " 11/06/23    Expected End:  02/04/24       Pt will improve bilat UE strength to 5/5 to improve I/ADLs            Pain       Goal 1       Start:  11/06/23    Expected End:  02/04/24       Pt will improve bilat UE flexibility to WNL To improve I/ADLs and decrease pain.         Goal 2       Start:  11/06/23    Expected End:  02/04/24       Pt will perform all housework with <3/10 pain.

## 2023-12-07 ENCOUNTER — DOCUMENTATION (OUTPATIENT)
Dept: PHYSICAL THERAPY | Facility: CLINIC | Age: 68
End: 2023-12-07
Payer: COMMERCIAL

## 2023-12-07 ENCOUNTER — APPOINTMENT (OUTPATIENT)
Dept: PHYSICAL THERAPY | Facility: CLINIC | Age: 68
End: 2023-12-07
Payer: COMMERCIAL

## 2023-12-07 NOTE — PROGRESS NOTES
Physical Therapy                 Therapy Communication Note    Patient Name: Jodi Johnson  MRN: [id number]  Today's Date: 12/7/2023     Discipline: Physical Therapy    Missed Visit Reason:      Missed Time: Cancel    Comment: Pt thought appt was earlier in the day.

## 2023-12-11 DIAGNOSIS — M54.12 CERVICAL RADICULOPATHY: Primary | ICD-10-CM

## 2023-12-12 ENCOUNTER — TREATMENT (OUTPATIENT)
Dept: PHYSICAL THERAPY | Facility: CLINIC | Age: 68
End: 2023-12-12
Payer: COMMERCIAL

## 2023-12-12 DIAGNOSIS — M54.2 CERVICAL SPINE PAIN: ICD-10-CM

## 2023-12-12 PROCEDURE — 97110 THERAPEUTIC EXERCISES: CPT | Mod: GP | Performed by: PHYSICAL THERAPIST

## 2023-12-12 RX ORDER — NAPROXEN 500 MG/1
500 TABLET ORAL 2 TIMES DAILY PRN
Qty: 60 TABLET | Refills: 5 | Status: SHIPPED | OUTPATIENT
Start: 2023-12-12

## 2023-12-12 NOTE — PROGRESS NOTES
"Physical Therapy Treatment    Patient Name: Jodi Johnson  MRN: [id number]  Today's Date: 12/12/2023  Time Calculation  Start Time: 1115  Stop Time: 1155  Time Calculation (min): 40 min  PT Therapeutic Procedures Time Entry  Therapeutic Exercise Time Entry: 40  Visit # 4/6    Current Problem   1. Cervical spine pain  Follow Up In Physical Therapy          Subjective   General   Pt reports increased soreness in the center of her neck this date, unsure as to why.     Precautions: None   Pain 7/10  Post Treatment Pain Level \"better\"    Objective   Cervical spine hypomobility, increased distally     Treatments:  UBE retro x5 minutes  UT stretch, 30 seconds x 3 ea R/L   Physio roll out, x2 minutes   Cervical retraction, 2x10   Scapula retraction, 2x10   Sh rows GTB, 2x10  Sh ext GTB, 2x10   Sh punch GTB, 2x10 ea R/L   Palms out stretch, 30 seconds x 3   Seated thoracic rotations, 2x10 ea R/L   Bilateral L5 flexion stretch, 2x10   BOSU pushes, 2x10       Assessment   Pt required cues for all exercises for correct posture alignment. Decreased symptoms following all stretching exercises.       Plan: Continue with strength / flexibility       Goals:   Active       Mobility       Goal 1       Start:  11/06/23    Expected End:  02/04/24       Pt will improve cervical spine AROM to WNL To improve I/ADLs         Goal 2       Start:  11/06/23    Expected End:  02/04/24       Pt will improve bilat UE strength to 5/5 to improve I/ADLs            Pain       Goal 1       Start:  11/06/23    Expected End:  02/04/24       Pt will improve bilat UE flexibility to WNL To improve I/ADLs and decrease pain.         Goal 2       Start:  11/06/23    Expected End:  02/04/24       Pt will perform all housework with <3/10 pain.              "

## 2023-12-14 ENCOUNTER — DOCUMENTATION (OUTPATIENT)
Dept: PHYSICAL THERAPY | Facility: CLINIC | Age: 68
End: 2023-12-14
Payer: COMMERCIAL

## 2023-12-14 NOTE — PROGRESS NOTES
Physical Therapy                 Therapy Communication Note    Patient Name: Jodi Johnson  MRN: [id number]  Today's Date: 12/14/2023     Discipline: Physical Therapy    Missed Visit Reason:      Missed Time: No Show    Comment:

## 2024-01-09 DIAGNOSIS — F51.01 PRIMARY INSOMNIA: ICD-10-CM

## 2024-01-09 RX ORDER — TRAZODONE HYDROCHLORIDE 100 MG/1
300 TABLET ORAL DAILY
Qty: 270 TABLET | Refills: 3 | Status: SHIPPED | OUTPATIENT
Start: 2024-01-09 | End: 2025-01-08

## 2024-01-24 ENCOUNTER — DOCUMENTATION (OUTPATIENT)
Dept: PHYSICAL THERAPY | Facility: CLINIC | Age: 69
End: 2024-01-24
Payer: COMMERCIAL

## 2024-01-24 NOTE — PROGRESS NOTES
Physical Therapy    Discharge Summary    Name: Jodi Johnson  MRN: [id number]  : 1955  Date: 24    Discharge Summary: PT    Discharge Information: Date of last visit 2023, Date of evaluation 2023, and Number of attended visits 4    Therapy Summary: Pt with good progress during therapy and has met all her goals. She is able to perform all housework without difficulty.    Discharge Status: Continue with HEP on own     Rehab Discharge Reason: Achieved all and/or the most significant goals(s)   I performed a face to face evaluation of this patient and performed a full history and physical examination on the patient.  I agree with the resident's history, physical examination, and plan of the patient.

## 2024-03-19 ENCOUNTER — OFFICE VISIT (OUTPATIENT)
Dept: OPHTHALMOLOGY | Facility: CLINIC | Age: 69
End: 2024-03-19
Payer: COMMERCIAL

## 2024-03-19 ENCOUNTER — APPOINTMENT (OUTPATIENT)
Dept: OPHTHALMOLOGY | Facility: CLINIC | Age: 69
End: 2024-03-19
Payer: COMMERCIAL

## 2024-03-19 ENCOUNTER — CLINICAL SUPPORT (OUTPATIENT)
Dept: OPHTHALMOLOGY | Facility: CLINIC | Age: 69
End: 2024-03-19
Payer: COMMERCIAL

## 2024-03-19 DIAGNOSIS — H25.813 COMBINED FORMS OF AGE-RELATED CATARACT OF BOTH EYES: Primary | ICD-10-CM

## 2024-03-19 DIAGNOSIS — H52.213 IRREGULAR ASTIGMATISM OF BOTH EYES: ICD-10-CM

## 2024-03-19 DIAGNOSIS — H52.7 REFRACTION ERROR: ICD-10-CM

## 2024-03-19 PROCEDURE — 92015 DETERMINE REFRACTIVE STATE: CPT | Performed by: OPHTHALMOLOGY

## 2024-03-19 PROCEDURE — 99214 OFFICE O/P EST MOD 30 MIN: CPT | Performed by: OPHTHALMOLOGY

## 2024-03-19 RX ORDER — BUPROPION HYDROCHLORIDE 150 MG/1
1 TABLET ORAL
COMMUNITY
Start: 2024-03-05

## 2024-03-19 RX ORDER — MONTELUKAST SODIUM 4 MG/1
1 TABLET, CHEWABLE ORAL 2 TIMES DAILY
COMMUNITY
Start: 2024-03-05 | End: 2024-04-03 | Stop reason: SDUPTHER

## 2024-03-19 ASSESSMENT — SLIT LAMP EXAM - LIDS
COMMENTS: 1+ BLEPHARITIS, 3+ DERMATOCHALASIS - UPPER LID
COMMENTS: 1+ BLEPHARITIS, 3+ DERMATOCHALASIS - UPPER LID

## 2024-03-19 ASSESSMENT — KERATOMETRY
OS_AXISANGLE_DEGREES: 165
OD_K2POWER_DIOPTERS: 45.00
OD_K1POWER_DIOPTERS: 42.25
OD_AXISANGLE_DEGREES: 10
OS_K1POWER_DIOPTERS: 42.75
OD_AXISANGLE2_DEGREES: 100
OS_AXISANGLE2_DEGREES: 75
OS_K2POWER_DIOPTERS: 45.50
METHOD_AUTO_MANUAL: AUTOMATED

## 2024-03-19 ASSESSMENT — ENCOUNTER SYMPTOMS
LOSS OF SENSATION IN FEET: 0
MUSCULOSKELETAL NEGATIVE: 0
PSYCHIATRIC NEGATIVE: 0
OCCASIONAL FEELINGS OF UNSTEADINESS: 0
ENDOCRINE NEGATIVE: 0
ALLERGIC/IMMUNOLOGIC NEGATIVE: 0
RESPIRATORY NEGATIVE: 0
CARDIOVASCULAR NEGATIVE: 0
DEPRESSION: 0
NEUROLOGICAL NEGATIVE: 0
EYES NEGATIVE: 0
CONSTITUTIONAL NEGATIVE: 0
GASTROINTESTINAL NEGATIVE: 0
HEMATOLOGIC/LYMPHATIC NEGATIVE: 0

## 2024-03-19 ASSESSMENT — PAIN SCALES - GENERAL: PAINLEVEL: 0-NO PAIN

## 2024-03-19 ASSESSMENT — CUP TO DISC RATIO
OS_RATIO: 0.3
OD_RATIO: 0.3

## 2024-03-19 ASSESSMENT — TONOMETRY
OD_IOP_MMHG: 18
IOP_METHOD: GOLDMANN APPLANATION
OS_IOP_MMHG: 18

## 2024-03-19 ASSESSMENT — REFRACTION_MANIFEST
OS_SPHERE: -0.50
OS_AXIS: 075
OS_CYLINDER: -4.00
OD_CYLINDER: -4.00
METHOD_AUTOREFRACTION: 1
OD_AXIS: 100
OD_SPHERE: +1.00

## 2024-03-19 ASSESSMENT — REFRACTION_WEARINGRX
OD_ADD: +2.50
OS_ADD: +2.50
OD_CYLINDER: -2.75
OS_SPHERE: -0.75
OD_AXIS: 104
OS_CYLINDER: -3.25
OS_AXIS: 077
OD_SPHERE: -0.25
SPECS_TYPE: BIFOCAL

## 2024-03-19 ASSESSMENT — PATIENT HEALTH QUESTIONNAIRE - PHQ9
1. LITTLE INTEREST OR PLEASURE IN DOING THINGS: NOT AT ALL
2. FEELING DOWN, DEPRESSED OR HOPELESS: NOT AT ALL
SUM OF ALL RESPONSES TO PHQ9 QUESTIONS 1 AND 2: 0

## 2024-03-19 ASSESSMENT — VISUAL ACUITY
OS_CC+: -1
METHOD: SNELLEN - SINGLE
OS_CC: 20/30
OD_CC+: -1
CORRECTION_TYPE: GLASSES
OD_CC: 20/30

## 2024-03-19 ASSESSMENT — EXTERNAL EXAM - LEFT EYE: OS_EXAM: BROW PTOSIS

## 2024-03-19 ASSESSMENT — EXTERNAL EXAM - RIGHT EYE: OD_EXAM: BROW PTOSIS

## 2024-03-19 NOTE — PROGRESS NOTES
Subjective   Patient ID: Jodi Johnson is a 68 y.o. female.    Chief Complaint    Annual Exam; Decreased Visual Acuity       HPI       Decreased Visual Acuity    Presenting in both eyes.  Context: distance vision and near vision.             Comments    Complete exam.  No new changes in health history or meds.  Vision is good.  No complaints.  Glasses fog.            Last edited by Juan Andrew MD on 3/19/2024 10:40 AM.        No current outpatient medications on file. (Ophthalmology pharm classes)       Current Outpatient Medications (Other)   Medication Sig Dispense Refill    buPROPion XL (Wellbutrin XL) 150 mg 24 hr tablet Take 1 tablet (150 mg) by mouth once daily in the morning. Take before meals.      calcium carbonate-vitamin D3 600 mg-20 mcg (800 unit) tablet Take 1 tablet by mouth once daily.      calcium citrate-vitamin D3 (Citracal+D) 315 mg-5 mcg (200 unit) tablet        colestipol (Colestid) 1 gram tablet Take 1 tablet (1 g) by mouth 2 times a day.      Creon 24,000-76,000 -120,000 unit capsule TAKE 1 CAPSULE BY MOUTH THREE TIMES A DAY WITH A MEAL 270 capsule 3    diclofenac sodium 1 % kit 2 grams Transdermal every 4 hours for 30 day(s)      hydrOXYzine HCL (Atarax) 25 mg tablet        magnesium oxide (Mag-Ox) 400 mg (241.3 mg magnesium) tablet 1 tablet Orally twice a day 180 tablet 3    mirtazapine (Remeron) 15 mg tablet 1/2 tablet at bedtime Orally Once a day for 90 day(s) 90 tablet 3    multivit-minerals/folic acid (ADULT ONE DAILY MULTIVITAMIN ORAL) 1 tablet Orally Once a day      naproxen (Naprosyn) 500 mg tablet Take 1 tablet (500 mg) by mouth 2 times a day as needed for mild pain (1 - 3). 60 tablet 5    pancrelipase, Lip-Prot-Amyl, (Creon) 12,000-38,000 -60,000 unit capsule        pantoprazole (ProtoNix) 40 mg EC tablet TAKE ONE TABLET BY MOUTH ONCE DAILY 30 tablet 0    PARoxetine (Paxil) 40 mg tablet Take 1 tablet (40 mg) by mouth once daily. 90 tablet 3    potassium chloride CR 20 mEq ER  tablet Take 1 tablet (20 mEq) by mouth once daily. 90 tablet 3    sucralfate (Carafate) 1 gram tablet TAKE ONE TABLET BY MOUTH TWICE DAILY ON EMPTY STOMACH 90 tablet 3    traZODone (Desyrel) 100 mg tablet Take 3 tablets (300 mg) by mouth once daily. 270 tablet 3    cyclobenzaprine (Flexeril) 10 mg tablet Take 1 tablet (10 mg) by mouth 2 times a day as needed for muscle spasms for up to 10 days. 10 tablet 0       Objective   Base Eye Exam       Visual Acuity (Snellen - Single)         Right Left Both    Dist cc 20/30 -1 20/30 -1     Near cc   J3      Correction: Glasses              Tonometry (Goldmann Applanation, 9:47 AM)         Right Left    Pressure 18 18              Pupils         Dark Shape React APD    Right 4 Round 2 None    Left 4 Round 2 None              Extraocular Movement         Right Left     Full Full              Dilation       Both eyes: 1% Tropic 2.5% Phen @ 9:47 AM                  Additional Tests       Keratometry (Automated)         K1 Axis K2 Axis    Right 42.25 100 45.00 10    Left 42.75 75 45.50 165                  Slit Lamp and Fundus Exam       External Exam         Right Left    External Brow ptosis Brow ptosis              Slit Lamp Exam         Right Left    Lids/Lashes 1+ Blepharitis, 3+ Dermatochalasis - upper lid 1+ Blepharitis, 3+ Dermatochalasis - upper lid    Conjunctiva/Sclera normal bulbar and palepbral conjunctiva normal bulbar and palepbral conjunctiva    Cornea normal epi/stroma/endo and tear film normal epi/stroma/endo and tear film    Anterior Chamber ant. chamber deep and quiet ant. chamber deep and quiet    Iris iris normal iris normal    Lens 1+ nuclear sclerosis, 1+ Cortical cataract 1+ nuclear sclerosis, 1+ Cortical cataract    Anterior Vitreous vitreous syneresis vitreous syneresis              Fundus Exam         Right Left    Disc normal optic nerve normal optic nerve    C/D Ratio 0.3 0.3    Macula normal macula normal macula    Vessels normal retinal vessels  normal retinal vessels    Periphery normal retinal periphery normal retinal periphery    Challenging exam.                  Refraction       Wearing Rx         Sphere Cylinder Axis Add    Right -0.25 -2.75 104 +2.50    Left -0.75 -3.25 077 +2.50      Type: Bifocal              Manifest Refraction (Auto)         Sphere Cylinder Axis    Right +1.00 -4.00 100    Left -0.50 -4.00 075      Pupillary Distance: 59              Final Rx         Sphere Cylinder [place] Dist VA Add Near VA    Right [place] -3.00 100 20/30 +2.50 20/25    Left -0.50 -3.25 080 20/30 +2.50 20/25      Type: Bifocal    Expiration Date: 3/19/2026    Pupillary Distance: 59                    Assessment/Plan   Problem List Items Addressed This Visit          Eye/Vision problems    Combined forms of age-related cataract of both eyes - Primary     F/u one year full.           Refraction error    Irregular astigmatism of both eyes

## 2024-03-25 DIAGNOSIS — K86.0 ALCOHOL-INDUCED CHRONIC PANCREATITIS (MULTI): ICD-10-CM

## 2024-03-25 RX ORDER — ONDANSETRON 4 MG/1
4 TABLET, ORALLY DISINTEGRATING ORAL EVERY 8 HOURS PRN
Qty: 90 TABLET | Refills: 0 | Status: SHIPPED | OUTPATIENT
Start: 2024-03-25

## 2024-03-25 RX ORDER — ONDANSETRON 4 MG/1
4 TABLET, ORALLY DISINTEGRATING ORAL EVERY 8 HOURS PRN
COMMUNITY
End: 2024-03-25 | Stop reason: SDUPTHER

## 2024-04-03 DIAGNOSIS — K58.0 IRRITABLE BOWEL SYNDROME WITH DIARRHEA: Primary | ICD-10-CM

## 2024-04-03 RX ORDER — MONTELUKAST SODIUM 4 MG/1
1 TABLET, CHEWABLE ORAL 2 TIMES DAILY
Qty: 180 TABLET | Refills: 3 | Status: SHIPPED | OUTPATIENT
Start: 2024-04-03

## 2024-04-12 ENCOUNTER — OFFICE VISIT (OUTPATIENT)
Dept: SURGICAL ONCOLOGY | Facility: CLINIC | Age: 69
End: 2024-04-12
Payer: COMMERCIAL

## 2024-04-12 VITALS
DIASTOLIC BLOOD PRESSURE: 73 MMHG | OXYGEN SATURATION: 97 % | BODY MASS INDEX: 18.17 KG/M2 | TEMPERATURE: 96.1 F | HEART RATE: 57 BPM | WEIGHT: 93.03 LBS | SYSTOLIC BLOOD PRESSURE: 156 MMHG | RESPIRATION RATE: 18 BRPM

## 2024-04-12 DIAGNOSIS — K86.1 CHRONIC CALCIFIC PANCREATITIS (MULTI): Primary | ICD-10-CM

## 2024-04-12 PROCEDURE — 99213 OFFICE O/P EST LOW 20 MIN: CPT | Performed by: SURGERY

## 2024-04-12 PROCEDURE — 4004F PT TOBACCO SCREEN RCVD TLK: CPT | Performed by: SURGERY

## 2024-04-12 PROCEDURE — 1160F RVW MEDS BY RX/DR IN RCRD: CPT | Performed by: SURGERY

## 2024-04-12 PROCEDURE — 3077F SYST BP >= 140 MM HG: CPT | Performed by: SURGERY

## 2024-04-12 PROCEDURE — 3078F DIAST BP <80 MM HG: CPT | Performed by: SURGERY

## 2024-04-12 PROCEDURE — 3008F BODY MASS INDEX DOCD: CPT | Performed by: SURGERY

## 2024-04-12 PROCEDURE — 1159F MED LIST DOCD IN RCRD: CPT | Performed by: SURGERY

## 2024-04-12 PROCEDURE — 1157F ADVNC CARE PLAN IN RCRD: CPT | Performed by: SURGERY

## 2024-04-12 PROCEDURE — 1126F AMNT PAIN NOTED NONE PRSNT: CPT | Performed by: SURGERY

## 2024-04-12 ASSESSMENT — PAIN SCALES - GENERAL: PAINLEVEL: 0-NO PAIN

## 2024-04-12 NOTE — PROGRESS NOTES
Chief complaint:  Follow-up chronic pancreatitis s/p Partington-Naida procedure    HPI:  This is a 68 y.o. female who presents with alcoholic pancreatitis. History as follows:  Obstructing stones in the pancreatic head. Has undergone multiple ERCPs since 2021, stones are not clearable.    Has daily pain that waxes and wanes. No clear exacerbating or alleviating factors.  No EtOH x 1yr. Still smokes around 1ppd.    Additionally, has a chronic CBD stricture. Most recently had stents taken out in 2/2023.    OR 4/27 for attempted Whipple. No safe tissue planes, plan changed to drainage procedure (Partington-Naida/lateral PJ) with extraction of multiple intrapancreatic duct stones. Postop course unremarkable, home on POD6.    Last visit with me on 6/9/2023. She was gaining some weight at that time and had improvement in her pain. Was still smoking 1ppd.    Interval history:  Still smoking 1ppd.     Denies pain. Feels well. Normal BMs.    She's eating appropriately. Taking creon as directed.      Visit Vitals  /73   Pulse 57   Temp 35.6 °C (96.1 °F) (Core)   Resp 18   42kg from 41 last visit    Physical Exam    Appears well, nad  Ewob  S/nt/nd, incision well-healed    Assessment and Plan:  68F with chronic pancreatitis related to EtOH s/p Partington-Naida lateral pancreaticojejunostomy. Her symptoms/pain are improved and essentially resolved.     Encouraged her to make a strong attempt at tobacco cessation given she risks blunting long-term success of drainage procedure and precipitating recurrent acute-on-chronic pancreatitis episodes. She says she'll try.    I'll see her in 12 months.    A total of 20 minutes were spent on this encounter including chart/imaging review, patient discussion/counseling, and coordination of care.

## 2024-05-23 DIAGNOSIS — G43.001 MIGRAINE WITHOUT AURA AND WITH STATUS MIGRAINOSUS, NOT INTRACTABLE: ICD-10-CM

## 2024-05-23 RX ORDER — ACETAMINOPHEN AND CODEINE PHOSPHATE 300; 30 MG/1; MG/1
1 TABLET ORAL EVERY 6 HOURS PRN
Qty: 20 TABLET | Refills: 0 | Status: SHIPPED | OUTPATIENT
Start: 2024-05-23 | End: 2024-06-02

## 2024-05-23 NOTE — TELEPHONE ENCOUNTER
Last appoint. 10/12/23. Pt states Dr. Noel gave pt tylenol with codeine for her migraines before and requests another rx.

## 2024-07-22 ENCOUNTER — APPOINTMENT (OUTPATIENT)
Dept: PRIMARY CARE | Facility: CLINIC | Age: 69
End: 2024-07-22
Payer: COMMERCIAL

## 2025-01-13 DIAGNOSIS — K21.9 GASTROESOPHAGEAL REFLUX DISEASE WITHOUT ESOPHAGITIS: ICD-10-CM

## 2025-01-13 DIAGNOSIS — E83.42 HYPOMAGNESEMIA: ICD-10-CM

## 2025-01-13 DIAGNOSIS — E87.6 HYPOKALEMIA: ICD-10-CM

## 2025-01-14 RX ORDER — LANOLIN ALCOHOL/MO/W.PET/CERES
CREAM (GRAM) TOPICAL
Qty: 180 TABLET | Refills: 3 | OUTPATIENT
Start: 2025-01-14

## 2025-01-14 RX ORDER — SUCRALFATE 1 G/1
TABLET ORAL
Qty: 90 TABLET | Refills: 3 | OUTPATIENT
Start: 2025-01-14

## 2025-01-14 RX ORDER — POTASSIUM CHLORIDE 20 MEQ/1
20 TABLET, EXTENDED RELEASE ORAL DAILY
Qty: 90 TABLET | Refills: 3 | OUTPATIENT
Start: 2025-01-14

## 2025-01-14 NOTE — TELEPHONE ENCOUNTER
PATIENT CALLED THE OFFICE LOOKING TO HAVE MEDICATIONS REFILLED. UPON LOOKING INTO HER CHART IT WAS POINTED OUT TO HER THAT SHE HAS NOT BEEN SEEN BY DR KAUFMAN SINCE OCTOBER OF 2023 AND IN THE SUMMER OF 2024 SHE SAW A [facility] INTERNAL MEDICINE PROVIDER TWICE. SHE GOT ANGRY AND HUNG UP ON ME

## 2025-01-31 ENCOUNTER — APPOINTMENT (OUTPATIENT)
Dept: PRIMARY CARE | Facility: CLINIC | Age: 70
End: 2025-01-31
Payer: COMMERCIAL

## 2025-03-24 ENCOUNTER — APPOINTMENT (OUTPATIENT)
Dept: OPHTHALMOLOGY | Facility: CLINIC | Age: 70
End: 2025-03-24
Payer: COMMERCIAL

## 2025-05-20 ENCOUNTER — HOSPITAL ENCOUNTER (INPATIENT)
Facility: HOSPITAL | Age: 70
LOS: 3 days | Discharge: HOME | DRG: 438 | End: 2025-05-23
Attending: STUDENT IN AN ORGANIZED HEALTH CARE EDUCATION/TRAINING PROGRAM | Admitting: INTERNAL MEDICINE
Payer: COMMERCIAL

## 2025-05-20 ENCOUNTER — APPOINTMENT (OUTPATIENT)
Dept: RADIOLOGY | Facility: HOSPITAL | Age: 70
DRG: 438 | End: 2025-05-20
Payer: COMMERCIAL

## 2025-05-20 ENCOUNTER — APPOINTMENT (OUTPATIENT)
Dept: CARDIOLOGY | Facility: HOSPITAL | Age: 70
DRG: 438 | End: 2025-05-20
Payer: COMMERCIAL

## 2025-05-20 DIAGNOSIS — R11.2 ACUTE NAUSEA WITH NONBILIOUS VOMITING: ICD-10-CM

## 2025-05-20 DIAGNOSIS — R10.9 ACUTE ABDOMINAL PAIN: Primary | ICD-10-CM

## 2025-05-20 DIAGNOSIS — K86.1 ACUTE ON CHRONIC PANCREATITIS (MULTI): ICD-10-CM

## 2025-05-20 DIAGNOSIS — K85.90 ACUTE ON CHRONIC PANCREATITIS (MULTI): ICD-10-CM

## 2025-05-20 LAB
ALBUMIN SERPL BCP-MCNC: 4.2 G/DL (ref 3.4–5)
ALP SERPL-CCNC: 95 U/L (ref 33–136)
ALT SERPL W P-5'-P-CCNC: 7 U/L (ref 7–45)
ANION GAP SERPL CALCULATED.3IONS-SCNC: 13 MMOL/L (ref 10–20)
APPEARANCE UR: CLEAR
AST SERPL W P-5'-P-CCNC: 12 U/L (ref 9–39)
ATRIAL RATE: 88 BPM
BASOPHILS # BLD AUTO: 0.06 X10*3/UL (ref 0–0.1)
BASOPHILS NFR BLD AUTO: 0.4 %
BILIRUB SERPL-MCNC: 0.4 MG/DL (ref 0–1.2)
BILIRUB UR STRIP.AUTO-MCNC: NEGATIVE MG/DL
BUN SERPL-MCNC: 15 MG/DL (ref 6–23)
CALCIUM SERPL-MCNC: 9.5 MG/DL (ref 8.6–10.3)
CHLORIDE SERPL-SCNC: 101 MMOL/L (ref 98–107)
CO2 SERPL-SCNC: 23 MMOL/L (ref 21–32)
COLOR UR: COLORLESS
CREAT SERPL-MCNC: 1.51 MG/DL (ref 0.5–1.05)
EGFRCR SERPLBLD CKD-EPI 2021: 38 ML/MIN/1.73M*2
EOSINOPHIL # BLD AUTO: 0.07 X10*3/UL (ref 0–0.7)
EOSINOPHIL NFR BLD AUTO: 0.5 %
ERYTHROCYTE [DISTWIDTH] IN BLOOD BY AUTOMATED COUNT: 11.9 % (ref 11.5–14.5)
GLUCOSE SERPL-MCNC: 150 MG/DL (ref 74–99)
GLUCOSE UR STRIP.AUTO-MCNC: NORMAL MG/DL
HCT VFR BLD AUTO: 34.4 % (ref 36–46)
HGB BLD-MCNC: 11.2 G/DL (ref 12–16)
IMM GRANULOCYTES # BLD AUTO: 0.09 X10*3/UL (ref 0–0.7)
IMM GRANULOCYTES NFR BLD AUTO: 0.6 % (ref 0–0.9)
KETONES UR STRIP.AUTO-MCNC: NEGATIVE MG/DL
LACTATE SERPL-SCNC: 1.2 MMOL/L (ref 0.4–2)
LEUKOCYTE ESTERASE UR QL STRIP.AUTO: NEGATIVE
LIPASE SERPL-CCNC: 31 U/L (ref 9–82)
LYMPHOCYTES # BLD AUTO: 1.54 X10*3/UL (ref 1.2–4.8)
LYMPHOCYTES NFR BLD AUTO: 10.6 %
MAGNESIUM SERPL-MCNC: 1.77 MG/DL (ref 1.6–2.4)
MCH RBC QN AUTO: 32.1 PG (ref 26–34)
MCHC RBC AUTO-ENTMCNC: 32.6 G/DL (ref 32–36)
MCV RBC AUTO: 99 FL (ref 80–100)
MONOCYTES # BLD AUTO: 0.76 X10*3/UL (ref 0.1–1)
MONOCYTES NFR BLD AUTO: 5.3 %
NEUTROPHILS # BLD AUTO: 11.95 X10*3/UL (ref 1.2–7.7)
NEUTROPHILS NFR BLD AUTO: 82.6 %
NITRITE UR QL STRIP.AUTO: NEGATIVE
NRBC BLD-RTO: 0 /100 WBCS (ref 0–0)
P AXIS: 75 DEGREES
P OFFSET: 196 MS
P ONSET: 153 MS
PH UR STRIP.AUTO: 7 [PH]
PLATELET # BLD AUTO: 215 X10*3/UL (ref 150–450)
POTASSIUM SERPL-SCNC: 5.1 MMOL/L (ref 3.5–5.3)
PR INTERVAL: 112 MS
PROT SERPL-MCNC: 7.3 G/DL (ref 6.4–8.2)
PROT UR STRIP.AUTO-MCNC: ABNORMAL MG/DL
Q ONSET: 209 MS
QRS COUNT: 15 BEATS
QRS DURATION: 80 MS
QT INTERVAL: 338 MS
QTC CALCULATION(BAZETT): 408 MS
QTC FREDERICIA: 384 MS
R AXIS: 71 DEGREES
RBC # BLD AUTO: 3.49 X10*6/UL (ref 4–5.2)
RBC # UR STRIP.AUTO: NEGATIVE MG/DL
RBC #/AREA URNS AUTO: NORMAL /HPF
SODIUM SERPL-SCNC: 132 MMOL/L (ref 136–145)
SP GR UR STRIP.AUTO: 1.02
T AXIS: 75 DEGREES
T OFFSET: 378 MS
UROBILINOGEN UR STRIP.AUTO-MCNC: NORMAL MG/DL
VENTRICULAR RATE: 88 BPM
WBC # BLD AUTO: 14.5 X10*3/UL (ref 4.4–11.3)
WBC #/AREA URNS AUTO: NORMAL /HPF

## 2025-05-20 PROCEDURE — 2500000004 HC RX 250 GENERAL PHARMACY W/ HCPCS (ALT 636 FOR OP/ED): Performed by: INTERNAL MEDICINE

## 2025-05-20 PROCEDURE — 74177 CT ABD & PELVIS W/CONTRAST: CPT

## 2025-05-20 PROCEDURE — 83605 ASSAY OF LACTIC ACID: CPT | Performed by: STUDENT IN AN ORGANIZED HEALTH CARE EDUCATION/TRAINING PROGRAM

## 2025-05-20 PROCEDURE — 85025 COMPLETE CBC W/AUTO DIFF WBC: CPT | Performed by: STUDENT IN AN ORGANIZED HEALTH CARE EDUCATION/TRAINING PROGRAM

## 2025-05-20 PROCEDURE — 2500000001 HC RX 250 WO HCPCS SELF ADMINISTERED DRUGS (ALT 637 FOR MEDICARE OP): Performed by: INTERNAL MEDICINE

## 2025-05-20 PROCEDURE — 2500000005 HC RX 250 GENERAL PHARMACY W/O HCPCS: Performed by: STUDENT IN AN ORGANIZED HEALTH CARE EDUCATION/TRAINING PROGRAM

## 2025-05-20 PROCEDURE — 84075 ASSAY ALKALINE PHOSPHATASE: CPT | Performed by: STUDENT IN AN ORGANIZED HEALTH CARE EDUCATION/TRAINING PROGRAM

## 2025-05-20 PROCEDURE — 96375 TX/PRO/DX INJ NEW DRUG ADDON: CPT

## 2025-05-20 PROCEDURE — 96374 THER/PROPH/DIAG INJ IV PUSH: CPT

## 2025-05-20 PROCEDURE — 93005 ELECTROCARDIOGRAM TRACING: CPT

## 2025-05-20 PROCEDURE — 2550000001 HC RX 255 CONTRASTS: Performed by: STUDENT IN AN ORGANIZED HEALTH CARE EDUCATION/TRAINING PROGRAM

## 2025-05-20 PROCEDURE — 83735 ASSAY OF MAGNESIUM: CPT | Performed by: STUDENT IN AN ORGANIZED HEALTH CARE EDUCATION/TRAINING PROGRAM

## 2025-05-20 PROCEDURE — 83690 ASSAY OF LIPASE: CPT | Performed by: STUDENT IN AN ORGANIZED HEALTH CARE EDUCATION/TRAINING PROGRAM

## 2025-05-20 PROCEDURE — 1200000002 HC GENERAL ROOM WITH TELEMETRY DAILY

## 2025-05-20 PROCEDURE — 74176 CT ABD & PELVIS W/O CONTRAST: CPT | Performed by: RADIOLOGY

## 2025-05-20 PROCEDURE — 2500000004 HC RX 250 GENERAL PHARMACY W/ HCPCS (ALT 636 FOR OP/ED): Performed by: STUDENT IN AN ORGANIZED HEALTH CARE EDUCATION/TRAINING PROGRAM

## 2025-05-20 PROCEDURE — 2500000001 HC RX 250 WO HCPCS SELF ADMINISTERED DRUGS (ALT 637 FOR MEDICARE OP): Performed by: STUDENT IN AN ORGANIZED HEALTH CARE EDUCATION/TRAINING PROGRAM

## 2025-05-20 PROCEDURE — 99285 EMERGENCY DEPT VISIT HI MDM: CPT | Mod: 25 | Performed by: STUDENT IN AN ORGANIZED HEALTH CARE EDUCATION/TRAINING PROGRAM

## 2025-05-20 PROCEDURE — 2500000002 HC RX 250 W HCPCS SELF ADMINISTERED DRUGS (ALT 637 FOR MEDICARE OP, ALT 636 FOR OP/ED): Performed by: INTERNAL MEDICINE

## 2025-05-20 PROCEDURE — 81001 URINALYSIS AUTO W/SCOPE: CPT | Performed by: INTERNAL MEDICINE

## 2025-05-20 PROCEDURE — 96361 HYDRATE IV INFUSION ADD-ON: CPT

## 2025-05-20 PROCEDURE — 36415 COLL VENOUS BLD VENIPUNCTURE: CPT | Performed by: STUDENT IN AN ORGANIZED HEALTH CARE EDUCATION/TRAINING PROGRAM

## 2025-05-20 RX ORDER — PANTOPRAZOLE SODIUM 40 MG/10ML
40 INJECTION, POWDER, LYOPHILIZED, FOR SOLUTION INTRAVENOUS
Status: DISCONTINUED | OUTPATIENT
Start: 2025-05-21 | End: 2025-05-23 | Stop reason: HOSPADM

## 2025-05-20 RX ORDER — PANTOPRAZOLE SODIUM 40 MG/1
40 TABLET, DELAYED RELEASE ORAL
Status: DISCONTINUED | OUTPATIENT
Start: 2025-05-21 | End: 2025-05-23 | Stop reason: HOSPADM

## 2025-05-20 RX ORDER — ACETAMINOPHEN 160 MG/5ML
650 SOLUTION ORAL EVERY 4 HOURS PRN
Status: DISCONTINUED | OUTPATIENT
Start: 2025-05-20 | End: 2025-05-23 | Stop reason: HOSPADM

## 2025-05-20 RX ORDER — MELATONIN 3 MG
1 CAPSULE ORAL NIGHTLY
COMMUNITY

## 2025-05-20 RX ORDER — SUCRALFATE 1 G/1
1 TABLET ORAL
Status: DISCONTINUED | OUTPATIENT
Start: 2025-05-20 | End: 2025-05-23 | Stop reason: HOSPADM

## 2025-05-20 RX ORDER — QUETIAPINE FUMARATE 25 MG/1
12.5 TABLET, FILM COATED ORAL NIGHTLY
COMMUNITY

## 2025-05-20 RX ORDER — ALUMINUM HYDROXIDE, MAGNESIUM HYDROXIDE, AND SIMETHICONE 1200; 120; 1200 MG/30ML; MG/30ML; MG/30ML
30 SUSPENSION ORAL ONCE
Status: COMPLETED | OUTPATIENT
Start: 2025-05-20 | End: 2025-05-20

## 2025-05-20 RX ORDER — MORPHINE SULFATE 2 MG/ML
2 INJECTION, SOLUTION INTRAMUSCULAR; INTRAVENOUS EVERY 4 HOURS PRN
Status: DISCONTINUED | OUTPATIENT
Start: 2025-05-20 | End: 2025-05-23 | Stop reason: HOSPADM

## 2025-05-20 RX ORDER — LISINOPRIL 20 MG/1
20 TABLET ORAL DAILY
COMMUNITY

## 2025-05-20 RX ORDER — FAMOTIDINE 10 MG/ML
20 INJECTION, SOLUTION INTRAVENOUS ONCE
Status: COMPLETED | OUTPATIENT
Start: 2025-05-20 | End: 2025-05-20

## 2025-05-20 RX ORDER — POLYETHYLENE GLYCOL 3350 17 G/17G
17 POWDER, FOR SOLUTION ORAL DAILY PRN
Status: DISCONTINUED | OUTPATIENT
Start: 2025-05-20 | End: 2025-05-23 | Stop reason: HOSPADM

## 2025-05-20 RX ORDER — PSYLLIUM HUSK 0.4 G
2 CAPSULE ORAL 2 TIMES DAILY
Status: DISCONTINUED | OUTPATIENT
Start: 2025-05-20 | End: 2025-05-23 | Stop reason: HOSPADM

## 2025-05-20 RX ORDER — GUAIFENESIN 600 MG/1
600 TABLET, EXTENDED RELEASE ORAL EVERY 12 HOURS PRN
Status: DISCONTINUED | OUTPATIENT
Start: 2025-05-20 | End: 2025-05-23 | Stop reason: HOSPADM

## 2025-05-20 RX ORDER — ACETAMINOPHEN 650 MG/1
650 SUPPOSITORY RECTAL EVERY 4 HOURS PRN
Status: DISCONTINUED | OUTPATIENT
Start: 2025-05-20 | End: 2025-05-23 | Stop reason: HOSPADM

## 2025-05-20 RX ORDER — DEXTROSE MONOHYDRATE, SODIUM CHLORIDE, AND POTASSIUM CHLORIDE 50; 1.49; 4.5 G/1000ML; G/1000ML; G/1000ML
100 INJECTION, SOLUTION INTRAVENOUS CONTINUOUS
Status: DISCONTINUED | OUTPATIENT
Start: 2025-05-20 | End: 2025-05-20

## 2025-05-20 RX ORDER — PANTOPRAZOLE SODIUM 40 MG/1
40 TABLET, DELAYED RELEASE ORAL
Status: DISCONTINUED | OUTPATIENT
Start: 2025-05-21 | End: 2025-05-20

## 2025-05-20 RX ORDER — BUPROPION HYDROCHLORIDE 150 MG/1
150 TABLET ORAL
Status: DISCONTINUED | OUTPATIENT
Start: 2025-05-21 | End: 2025-05-23 | Stop reason: HOSPADM

## 2025-05-20 RX ORDER — LISINOPRIL 20 MG/1
20 TABLET ORAL DAILY
Status: DISCONTINUED | OUTPATIENT
Start: 2025-05-21 | End: 2025-05-23 | Stop reason: HOSPADM

## 2025-05-20 RX ORDER — POTASSIUM CHLORIDE 20 MEQ/1
20 TABLET, EXTENDED RELEASE ORAL DAILY
Status: DISCONTINUED | OUTPATIENT
Start: 2025-05-21 | End: 2025-05-23 | Stop reason: HOSPADM

## 2025-05-20 RX ORDER — TRAZODONE HYDROCHLORIDE 100 MG/1
100 TABLET ORAL NIGHTLY
Status: DISCONTINUED | OUTPATIENT
Start: 2025-05-20 | End: 2025-05-23 | Stop reason: HOSPADM

## 2025-05-20 RX ORDER — ACETAMINOPHEN 500 MG
1000 TABLET ORAL ONCE
Status: COMPLETED | OUTPATIENT
Start: 2025-05-20 | End: 2025-05-20

## 2025-05-20 RX ORDER — PAROXETINE 20 MG/1
40 TABLET, FILM COATED ORAL DAILY
Status: DISCONTINUED | OUTPATIENT
Start: 2025-05-20 | End: 2025-05-23 | Stop reason: HOSPADM

## 2025-05-20 RX ORDER — DICLOFENAC SODIUM 10 MG/G
4 GEL TOPICAL 4 TIMES DAILY PRN
Status: DISCONTINUED | OUTPATIENT
Start: 2025-05-20 | End: 2025-05-23 | Stop reason: HOSPADM

## 2025-05-20 RX ORDER — ONDANSETRON HYDROCHLORIDE 2 MG/ML
4 INJECTION, SOLUTION INTRAVENOUS ONCE
Status: COMPLETED | OUTPATIENT
Start: 2025-05-20 | End: 2025-05-20

## 2025-05-20 RX ORDER — MIRTAZAPINE 7.5 MG/1
7.5 TABLET, FILM COATED ORAL NIGHTLY
Status: DISCONTINUED | OUTPATIENT
Start: 2025-05-20 | End: 2025-05-23 | Stop reason: HOSPADM

## 2025-05-20 RX ORDER — DEXTROSE MONOHYDRATE AND SODIUM CHLORIDE 5; .45 G/100ML; G/100ML
100 INJECTION, SOLUTION INTRAVENOUS CONTINUOUS
Status: ACTIVE | OUTPATIENT
Start: 2025-05-20 | End: 2025-05-21

## 2025-05-20 RX ORDER — HEPARIN SODIUM 5000 [USP'U]/ML
5000 INJECTION, SOLUTION INTRAVENOUS; SUBCUTANEOUS EVERY 8 HOURS SCHEDULED
Status: DISCONTINUED | OUTPATIENT
Start: 2025-05-20 | End: 2025-05-23 | Stop reason: HOSPADM

## 2025-05-20 RX ORDER — TRAZODONE HYDROCHLORIDE 100 MG/1
100 TABLET ORAL NIGHTLY
COMMUNITY

## 2025-05-20 RX ORDER — COLESTIPOL HYDROCHLORIDE 1 G/1
1 TABLET ORAL 2 TIMES DAILY
Status: DISCONTINUED | OUTPATIENT
Start: 2025-05-20 | End: 2025-05-23 | Stop reason: HOSPADM

## 2025-05-20 RX ORDER — QUETIAPINE FUMARATE 25 MG/1
12.5 TABLET, FILM COATED ORAL NIGHTLY
Status: DISCONTINUED | OUTPATIENT
Start: 2025-05-20 | End: 2025-05-23 | Stop reason: HOSPADM

## 2025-05-20 RX ORDER — ACETAMINOPHEN 325 MG/1
650 TABLET ORAL EVERY 4 HOURS PRN
Status: DISCONTINUED | OUTPATIENT
Start: 2025-05-20 | End: 2025-05-23 | Stop reason: HOSPADM

## 2025-05-20 RX ORDER — CYCLOBENZAPRINE HCL 10 MG
10 TABLET ORAL 2 TIMES DAILY PRN
Status: DISCONTINUED | OUTPATIENT
Start: 2025-05-20 | End: 2025-05-23 | Stop reason: HOSPADM

## 2025-05-20 RX ORDER — TALC
6 POWDER (GRAM) TOPICAL NIGHTLY PRN
Status: DISCONTINUED | OUTPATIENT
Start: 2025-05-20 | End: 2025-05-23 | Stop reason: HOSPADM

## 2025-05-20 RX ORDER — HYDROXYZINE HYDROCHLORIDE 25 MG/1
25 TABLET, FILM COATED ORAL 4 TIMES DAILY
Status: DISCONTINUED | OUTPATIENT
Start: 2025-05-20 | End: 2025-05-23 | Stop reason: HOSPADM

## 2025-05-20 RX ORDER — SUCRALFATE 1 G/1
1 TABLET ORAL
COMMUNITY

## 2025-05-20 RX ORDER — GUAIFENESIN/DEXTROMETHORPHAN 100-10MG/5
5 SYRUP ORAL EVERY 4 HOURS PRN
Status: DISCONTINUED | OUTPATIENT
Start: 2025-05-20 | End: 2025-05-23 | Stop reason: HOSPADM

## 2025-05-20 RX ORDER — LIDOCAINE HYDROCHLORIDE 20 MG/ML
15 SOLUTION OROPHARYNGEAL ONCE
Status: COMPLETED | OUTPATIENT
Start: 2025-05-20 | End: 2025-05-20

## 2025-05-20 RX ORDER — LANOLIN ALCOHOL/MO/W.PET/CERES
400 CREAM (GRAM) TOPICAL DAILY
Status: DISCONTINUED | OUTPATIENT
Start: 2025-05-20 | End: 2025-05-23 | Stop reason: HOSPADM

## 2025-05-20 RX ADMIN — DEXTROSE MONOHYDRATE AND SODIUM CHLORIDE 100 ML/HR: 5; .45 INJECTION, SOLUTION INTRAVENOUS at 16:56

## 2025-05-20 RX ADMIN — LIDOCAINE HYDROCHLORIDE 15 ML: 20 SOLUTION ORAL at 08:07

## 2025-05-20 RX ADMIN — HEPARIN SODIUM 5000 UNITS: 5000 INJECTION, SOLUTION INTRAVENOUS; SUBCUTANEOUS at 15:23

## 2025-05-20 RX ADMIN — Medication 2 TABLET: at 20:22

## 2025-05-20 RX ADMIN — HYDROXYZINE HYDROCHLORIDE 25 MG: 25 TABLET, FILM COATED ORAL at 20:22

## 2025-05-20 RX ADMIN — ACETAMINOPHEN 1000 MG: 500 TABLET ORAL at 08:06

## 2025-05-20 RX ADMIN — SODIUM CHLORIDE, SODIUM LACTATE, POTASSIUM CHLORIDE, AND CALCIUM CHLORIDE 2000 ML: 600; 310; 30; 20 INJECTION, SOLUTION INTRAVENOUS at 08:06

## 2025-05-20 RX ADMIN — IOHEXOL 75 ML: 350 INJECTION, SOLUTION INTRAVENOUS at 09:31

## 2025-05-20 RX ADMIN — MORPHINE SULFATE 2 MG: 2 INJECTION, SOLUTION INTRAMUSCULAR; INTRAVENOUS at 15:17

## 2025-05-20 RX ADMIN — MIRTAZAPINE 7.5 MG: 7.5 TABLET, FILM COATED ORAL at 20:22

## 2025-05-20 RX ADMIN — TRAZODONE HYDROCHLORIDE 100 MG: 100 TABLET ORAL at 20:22

## 2025-05-20 RX ADMIN — Medication 1 TABLET: at 15:23

## 2025-05-20 RX ADMIN — HEPARIN SODIUM 5000 UNITS: 5000 INJECTION, SOLUTION INTRAVENOUS; SUBCUTANEOUS at 21:59

## 2025-05-20 RX ADMIN — COLESTIPOL HYDROCHLORIDE 1 G: 1 TABLET, FILM COATED ORAL at 17:00

## 2025-05-20 RX ADMIN — ONDANSETRON 4 MG: 2 INJECTION, SOLUTION INTRAMUSCULAR; INTRAVENOUS at 08:07

## 2025-05-20 RX ADMIN — Medication 2 TABLET: at 15:22

## 2025-05-20 RX ADMIN — ALUMINUM HYDROXIDE, MAGNESIUM HYDROXIDE, AND SIMETHICONE 30 ML: 200; 200; 20 SUSPENSION ORAL at 08:07

## 2025-05-20 RX ADMIN — SUCRALFATE 1 G: 1 TABLET ORAL at 16:55

## 2025-05-20 RX ADMIN — SUCRALFATE 1 G: 1 TABLET ORAL at 20:22

## 2025-05-20 RX ADMIN — FAMOTIDINE 20 MG: 10 INJECTION, SOLUTION INTRAVENOUS at 08:07

## 2025-05-20 RX ADMIN — PANCRELIPASE 1 CAPSULE: 120000; 24000; 76000 CAPSULE, DELAYED RELEASE PELLETS ORAL at 17:00

## 2025-05-20 RX ADMIN — QUETIAPINE FUMARATE 12.5 MG: 25 TABLET ORAL at 20:22

## 2025-05-20 RX ADMIN — COLESTIPOL HYDROCHLORIDE 1 G: 1 TABLET, FILM COATED ORAL at 20:22

## 2025-05-20 RX ADMIN — PAROXETINE HYDROCHLORIDE 40 MG: 20 TABLET, FILM COATED ORAL at 15:23

## 2025-05-20 SDOH — HEALTH STABILITY: MENTAL HEALTH: HOW OFTEN DO YOU HAVE A DRINK CONTAINING ALCOHOL?: NEVER

## 2025-05-20 SDOH — SOCIAL STABILITY: SOCIAL INSECURITY: WITHIN THE LAST YEAR, HAVE YOU BEEN HUMILIATED OR EMOTIONALLY ABUSED IN OTHER WAYS BY YOUR PARTNER OR EX-PARTNER?: NO

## 2025-05-20 SDOH — SOCIAL STABILITY: SOCIAL INSECURITY: ARE YOU MARRIED, WIDOWED, DIVORCED, SEPARATED, NEVER MARRIED, OR LIVING WITH A PARTNER?: DIVORCED

## 2025-05-20 SDOH — SOCIAL STABILITY: SOCIAL INSECURITY: HAVE YOU HAD THOUGHTS OF HARMING ANYONE ELSE?: NO

## 2025-05-20 SDOH — ECONOMIC STABILITY: TRANSPORTATION INSECURITY: IN THE PAST 12 MONTHS, HAS LACK OF TRANSPORTATION KEPT YOU FROM MEDICAL APPOINTMENTS OR FROM GETTING MEDICATIONS?: NO

## 2025-05-20 SDOH — ECONOMIC STABILITY: HOUSING INSECURITY: AT ANY TIME IN THE PAST 12 MONTHS, WERE YOU HOMELESS OR LIVING IN A SHELTER (INCLUDING NOW)?: NO

## 2025-05-20 SDOH — ECONOMIC STABILITY: FOOD INSECURITY: WITHIN THE PAST 12 MONTHS, THE FOOD YOU BOUGHT JUST DIDN'T LAST AND YOU DIDN'T HAVE MONEY TO GET MORE.: NEVER TRUE

## 2025-05-20 SDOH — ECONOMIC STABILITY: FOOD INSECURITY: WITHIN THE PAST 12 MONTHS, THE FOOD YOU BOUGHT JUST DIDN'T LAST AND YOU DIDN'T HAVE MONEY TO GET MORE.: PATIENT DECLINED

## 2025-05-20 SDOH — SOCIAL STABILITY: SOCIAL INSECURITY
WITHIN THE LAST YEAR, HAVE YOU BEEN KICKED, HIT, SLAPPED, OR OTHERWISE PHYSICALLY HURT BY YOUR PARTNER OR EX-PARTNER?: NO

## 2025-05-20 SDOH — ECONOMIC STABILITY: HOUSING INSECURITY: IN THE PAST 12 MONTHS, HOW MANY TIMES HAVE YOU MOVED WHERE YOU WERE LIVING?: 0

## 2025-05-20 SDOH — HEALTH STABILITY: PHYSICAL HEALTH
HOW OFTEN DO YOU NEED TO HAVE SOMEONE HELP YOU WHEN YOU READ INSTRUCTIONS, PAMPHLETS, OR OTHER WRITTEN MATERIAL FROM YOUR DOCTOR OR PHARMACY?: NEVER

## 2025-05-20 SDOH — SOCIAL STABILITY: SOCIAL NETWORK: HOW OFTEN DO YOU GET TOGETHER WITH FRIENDS OR RELATIVES?: MORE THAN THREE TIMES A WEEK

## 2025-05-20 SDOH — HEALTH STABILITY: MENTAL HEALTH
DO YOU FEEL STRESS - TENSE, RESTLESS, NERVOUS, OR ANXIOUS, OR UNABLE TO SLEEP AT NIGHT BECAUSE YOUR MIND IS TROUBLED ALL THE TIME - THESE DAYS?: NOT AT ALL

## 2025-05-20 SDOH — SOCIAL STABILITY: SOCIAL INSECURITY
WITHIN THE LAST YEAR, HAVE YOU BEEN RAPED OR FORCED TO HAVE ANY KIND OF SEXUAL ACTIVITY BY YOUR PARTNER OR EX-PARTNER?: NO

## 2025-05-20 SDOH — SOCIAL STABILITY: SOCIAL INSECURITY: WITHIN THE LAST YEAR, HAVE YOU BEEN AFRAID OF YOUR PARTNER OR EX-PARTNER?: NO

## 2025-05-20 SDOH — HEALTH STABILITY: MENTAL HEALTH: HOW MANY DRINKS CONTAINING ALCOHOL DO YOU HAVE ON A TYPICAL DAY WHEN YOU ARE DRINKING?: PATIENT DOES NOT DRINK

## 2025-05-20 SDOH — SOCIAL STABILITY: SOCIAL INSECURITY: HAS ANYONE EVER THREATENED TO HURT YOUR FAMILY OR YOUR PETS?: NO

## 2025-05-20 SDOH — ECONOMIC STABILITY: HOUSING INSECURITY: IN THE LAST 12 MONTHS, WAS THERE A TIME WHEN YOU WERE NOT ABLE TO PAY THE MORTGAGE OR RENT ON TIME?: NO

## 2025-05-20 SDOH — ECONOMIC STABILITY: FOOD INSECURITY: WITHIN THE PAST 12 MONTHS, YOU WORRIED THAT YOUR FOOD WOULD RUN OUT BEFORE YOU GOT THE MONEY TO BUY MORE.: NEVER TRUE

## 2025-05-20 SDOH — ECONOMIC STABILITY: FOOD INSECURITY
WITHIN THE PAST 12 MONTHS, YOU WORRIED THAT YOUR FOOD WOULD RUN OUT BEFORE YOU GOT THE MONEY TO BUY MORE.: PATIENT DECLINED

## 2025-05-20 SDOH — HEALTH STABILITY: MENTAL HEALTH: HOW OFTEN DO YOU HAVE SIX OR MORE DRINKS ON ONE OCCASION?: NEVER

## 2025-05-20 SDOH — SOCIAL STABILITY: SOCIAL INSECURITY: DOES ANYONE TRY TO KEEP YOU FROM HAVING/CONTACTING OTHER FRIENDS OR DOING THINGS OUTSIDE YOUR HOME?: NO

## 2025-05-20 SDOH — ECONOMIC STABILITY: FOOD INSECURITY: HOW HARD IS IT FOR YOU TO PAY FOR THE VERY BASICS LIKE FOOD, HOUSING, MEDICAL CARE, AND HEATING?: PATIENT DECLINED

## 2025-05-20 SDOH — ECONOMIC STABILITY: INCOME INSECURITY: IN THE PAST 12 MONTHS HAS THE ELECTRIC, GAS, OIL, OR WATER COMPANY THREATENED TO SHUT OFF SERVICES IN YOUR HOME?: NO

## 2025-05-20 SDOH — SOCIAL STABILITY: SOCIAL INSECURITY: DO YOU FEEL ANYONE HAS EXPLOITED OR TAKEN ADVANTAGE OF YOU FINANCIALLY OR OF YOUR PERSONAL PROPERTY?: NO

## 2025-05-20 SDOH — ECONOMIC STABILITY: FOOD INSECURITY: HOW HARD IS IT FOR YOU TO PAY FOR THE VERY BASICS LIKE FOOD, HOUSING, MEDICAL CARE, AND HEATING?: NOT HARD AT ALL

## 2025-05-20 SDOH — SOCIAL STABILITY: SOCIAL INSECURITY: DO YOU FEEL UNSAFE GOING BACK TO THE PLACE WHERE YOU ARE LIVING?: NO

## 2025-05-20 SDOH — SOCIAL STABILITY: SOCIAL NETWORK
IN A TYPICAL WEEK, HOW MANY TIMES DO YOU TALK ON THE PHONE WITH FAMILY, FRIENDS, OR NEIGHBORS?: MORE THAN THREE TIMES A WEEK

## 2025-05-20 SDOH — SOCIAL STABILITY: SOCIAL INSECURITY: HAVE YOU HAD ANY THOUGHTS OF HARMING ANYONE ELSE?: NO

## 2025-05-20 SDOH — SOCIAL STABILITY: SOCIAL INSECURITY: ARE YOU OR HAVE YOU BEEN THREATENED OR ABUSED PHYSICALLY, EMOTIONALLY, OR SEXUALLY BY ANYONE?: NO

## 2025-05-20 SDOH — SOCIAL STABILITY: SOCIAL INSECURITY: WERE YOU ABLE TO COMPLETE ALL THE BEHAVIORAL HEALTH SCREENINGS?: YES

## 2025-05-20 SDOH — SOCIAL STABILITY: SOCIAL INSECURITY: ARE THERE ANY APPARENT SIGNS OF INJURIES/BEHAVIORS THAT COULD BE RELATED TO ABUSE/NEGLECT?: NO

## 2025-05-20 SDOH — SOCIAL STABILITY: SOCIAL INSECURITY: ABUSE: ADULT

## 2025-05-20 ASSESSMENT — PAIN SCALES - GENERAL
PAINLEVEL_OUTOF10: 2
PAINLEVEL_OUTOF10: 8
PAINLEVEL_OUTOF10: 0 - NO PAIN
PAINLEVEL_OUTOF10: 7
PAINLEVEL_OUTOF10: 0 - NO PAIN

## 2025-05-20 ASSESSMENT — COGNITIVE AND FUNCTIONAL STATUS - GENERAL
MOBILITY SCORE: 24
MOBILITY SCORE: 24
PATIENT BASELINE BEDBOUND: NO
DAILY ACTIVITIY SCORE: 24
DAILY ACTIVITIY SCORE: 24

## 2025-05-20 ASSESSMENT — ACTIVITIES OF DAILY LIVING (ADL)
TOILETING: INDEPENDENT
HEARING - RIGHT EAR: FUNCTIONAL
JUDGMENT_ADEQUATE_SAFELY_COMPLETE_DAILY_ACTIVITIES: YES
WALKS IN HOME: INDEPENDENT
PATIENT'S MEMORY ADEQUATE TO SAFELY COMPLETE DAILY ACTIVITIES?: YES
GROOMING: INDEPENDENT
BATHING: INDEPENDENT
FEEDING YOURSELF: INDEPENDENT
LACK_OF_TRANSPORTATION: NO
HEARING - LEFT EAR: FUNCTIONAL
DRESSING YOURSELF: INDEPENDENT
ADEQUATE_TO_COMPLETE_ADL: YES
LACK_OF_TRANSPORTATION: NO

## 2025-05-20 ASSESSMENT — PATIENT HEALTH QUESTIONNAIRE - PHQ9
SUM OF ALL RESPONSES TO PHQ9 QUESTIONS 1 & 2: 0
1. LITTLE INTEREST OR PLEASURE IN DOING THINGS: NOT AT ALL
2. FEELING DOWN, DEPRESSED OR HOPELESS: NOT AT ALL

## 2025-05-20 ASSESSMENT — LIFESTYLE VARIABLES
SKIP TO QUESTIONS 9-10: 1
SKIP TO QUESTIONS 9-10: 1
HOW OFTEN DO YOU HAVE A DRINK CONTAINING ALCOHOL: NEVER
AUDIT-C TOTAL SCORE: 0
HOW MANY STANDARD DRINKS CONTAINING ALCOHOL DO YOU HAVE ON A TYPICAL DAY: PATIENT DOES NOT DRINK
AUDIT-C TOTAL SCORE: 0
AUDIT-C TOTAL SCORE: 0
HOW OFTEN DO YOU HAVE 6 OR MORE DRINKS ON ONE OCCASION: NEVER

## 2025-05-20 ASSESSMENT — PAIN - FUNCTIONAL ASSESSMENT
PAIN_FUNCTIONAL_ASSESSMENT: 0-10
PAIN_FUNCTIONAL_ASSESSMENT: 0-10
PAIN_FUNCTIONAL_ASSESSMENT: WONG-BAKER FACES
PAIN_FUNCTIONAL_ASSESSMENT: 0-10

## 2025-05-20 ASSESSMENT — PAIN DESCRIPTION - PAIN TYPE: TYPE: ACUTE PAIN

## 2025-05-20 ASSESSMENT — PAIN DESCRIPTION - ORIENTATION
ORIENTATION: MID;UPPER
ORIENTATION: MID;UPPER

## 2025-05-20 ASSESSMENT — PAIN DESCRIPTION - DESCRIPTORS: DESCRIPTORS: ACHING

## 2025-05-20 ASSESSMENT — PAIN DESCRIPTION - LOCATION
LOCATION: ABDOMEN
LOCATION: ABDOMEN

## 2025-05-20 ASSESSMENT — PAIN SCALES - WONG BAKER: WONGBAKER_NUMERICALRESPONSE: NO HURT

## 2025-05-20 ASSESSMENT — PAIN DESCRIPTION - PROGRESSION: CLINICAL_PROGRESSION: NOT CHANGED

## 2025-05-20 NOTE — PROGRESS NOTES
05/20/25 1513   Financial Resource Strain   How hard is it for you to pay for the very basics like food, housing, medical care, and heating? Not hard   Housing Stability   In the last 12 months, was there a time when you were not able to pay the mortgage or rent on time? N   In the past 12 months, how many times have you moved where you were living? 0   At any time in the past 12 months, were you homeless or living in a shelter (including now)? N   Transportation Needs   In the past 12 months, has lack of transportation kept you from medical appointments or from getting medications? no   In the past 12 months, has lack of transportation kept you from meetings, work, or from getting things needed for daily living? No   Food Insecurity   Within the past 12 months, you worried that your food would run out before you got the money to buy more. Never true   Within the past 12 months, the food you bought just didn't last and you didn't have money to get more. Never true   Stress   Do you feel stress - tense, restless, nervous, or anxious, or unable to sleep at night because your mind is troubled all the time - these days? Not at all   Social Connections   In a typical week, how many times do you talk on the phone with family, friends, or neighbors? More than 3   How often do you get together with friends or relatives? More than 3   Are you , , , , never , or living with a partner?    Intimate Partner Violence   Within the last year, have you been afraid of your partner or ex-partner? No   Within the last year, have you been humiliated or emotionally abused in other ways by your partner or ex-partner? No   Within the last year, have you been kicked, hit, slapped, or otherwise physically hurt by your partner or ex-partner? No   Within the last year, have you been raped or forced to have any kind of sexual activity by your partner or ex-partner? No   Alcohol Use   Q1: How  often do you have a drink containing alcohol? Never   Q2: How many drinks containing alcohol do you have on a typical day when you are drinking? None   Q3: How often do you have six or more drinks on one occasion? Never   Utilities   In the past 12 months has the electric, gas, oil, or water company threatened to shut off services in your home? No   Health Literacy   How often do you need to have someone help you when you read instructions, pamphlets, or other written material from your doctor or pharmacy? Never   Follow-Ups   We make community resources available to all of our patients to assist with everyday needs. We may be able to connect you with those resources. Would you be interested? N

## 2025-05-20 NOTE — NURSING NOTE
Notified Dr Rader that pt potassium level was 5.1 on labs, got MAR alert when attempting to admin IV fluids with potassium. He said he will review

## 2025-05-20 NOTE — ED PROVIDER NOTES
Emergency Department Provider Note       History of Present Illness     History provided by: Patient  Limitations to History: None  External Records Reviewed with Brief Summary: See MDM    HPI:  Jodi Johnson is a 69 y.o. female presents ED for epigastric pain for the last 24 to 48 hours.  Comes to the ED at recommendation from provider with concern of acute on chronic pancreatitis given symptoms and history.  Notes nausea without any bouts of emesis.  Notes pain is deep dull ache, moderate severity, constant, mildly rating to lower thoracic/lumbar region, no alleviating/aggravating factors.  Notes mild diarrhea without any notable hematochezia/melena.  Denies any appreciable abdominal distention/fullness.  Notes no urinary symptoms and notes no history of nephro/urolithiasis.  Denies appreciable fever/chills, lightheadedness dizziness, or diaphoresis.  Denies any changes in dietary intake or recent travel.  Denies any bilateral/unilateral leg swelling and notes no numbness/tingling/weakness of her lower extremities.    Physical Exam   Triage vitals:  T 36.8 °C (98.2 °F)  HR 85  BP (!) 160/92  RR 16  O2 97 % None (Room air)    Physical Exam  Vitals and nursing note reviewed.   Constitutional:       General: She is not in acute distress.     Appearance: Normal appearance. She is ill-appearing. She is not toxic-appearing.   HENT:      Mouth/Throat:      Mouth: Mucous membranes are moist.      Pharynx: Oropharynx is clear.   Eyes:      General: No scleral icterus.     Conjunctiva/sclera: Conjunctivae normal.   Cardiovascular:      Rate and Rhythm: Normal rate.   Pulmonary:      Effort: Pulmonary effort is normal.   Abdominal:      General: Abdomen is flat. There is no distension.      Palpations: Abdomen is soft. There is hepatomegaly. There is no mass.      Tenderness: There is abdominal tenderness in the epigastric area and periumbilical area. There is no right CVA tenderness, left CVA tenderness or guarding.       Comments: Soft, flat, mild hepatomegaly, tenderness palpation epigastric/periumbilical region, no abdominal wall rigidity or voluntary guarding no endorsement of associated rebound tenderness, no appreciable epigastric fullness/mass, no appreciable abdominal distention/increased tympany, no appreciable pain out of portion to/with exam   Skin:     General: Skin is warm and dry.      Coloration: Skin is not jaundiced or pale.   Neurological:      General: No focal deficit present.      Mental Status: She is alert and oriented to person, place, and time.           Medical Decision Making & ED Course   Medical Decision Makin y.o. female presented to the ED for epigastric pain for the last 24 to 48 hours with associated nausea with concerning PMHx of chronic pancreatitis, COPD, GERD, HTN, HLD.  I personally reviewed and interpreted VS, labs, images, EKG which are as stated above in the ED course.    Assessment/evaluation multifactorial consistent with chronic pancreatitis with mass effect from inflammation and extra/intrapelvic biliary dilatation, associated nausea and emesis with decreased p.o. hydration/nutrition intake resulting in moderate WESLEY, significantly symptomatic and inability to tolerate p.o.    After receiving an appropriate exam, clinical work-up, and necessary interventions/treatment, Patient is appropriate for [facility] (tele) at this time due to pain control/management, further interrogation/management of symptoms, and unable to care for self or manage presenting symptoms/conditions at home/living situation.  Patient was encouraged to ask any questions or for clarification of today's ED encounter.  Patient is agreeable to plan of care.      Per Chart Review: CT A/P obtained on 2023 notable for extensive dystrophic calcification of the pancreas consistent with chronic pancreatitis, no evidence of pseudocyst or pancreatic mass/lesion.      Parts of this chart have been  completed using voice-to-tect recognition software. Please excuse any errors of transcription that were missed for editing/correcting.  ----      Differential diagnoses considered include but are not limited to: Pancreatic pseudocyst, necrotizing pancreatitis, acute hepatitis, hemorrhagic gastritis/PUD, SBO, bowel perforation/pneumoperitoneum, UTI/stay/pyonephritis, nephro/urolithiasis or hydroureteronephrosis, AAA, colitis/diverticulitis    Social Determinants of Health which Significantly Impact Care: Social Determinants of Health which Significantly Impact Care: None identified     EKG Independent Interpretation: EKG interpreted by myself. Please see ED Course for full interpretation.    Independent Result Review and Interpretation: Relevant laboratory and radiographic results were reviewed and independently interpreted by myself.  As necessary, they are commented on in the ED Course.    Chronic conditions affecting the patient's care: As documented above in MDM    The patient was discussed with the following consultants/services: None    Care Considerations: None    ED Course:  ED Course as of 05/21/25 1050   Tue May 20, 2025   0805 VS notable for mildly hypertensive on presentation in setting of acute onset abdominal pain with history of pancreatitis, remaining VSS [BC]   0810 CBC and Auto Differential(!)  Mild leukocytosis with predominant neutrophilia although likely near baseline, baseline normocytic anemia in the setting of acute abdominal send history of pancreatitis, no immediate concern for sepsis at this time [BC]   0811 I personally reviewed and interpreted the EKG @0810: NSR 88, normal axis/intervals and no appreciable ischemia, non-specific TW findings, and prior EKG on 4/26/2023 reviewed without any appreciable specific/identifiable changes [BC]   0844 Comprehensive metabolic panel(!)  Mild to moderate WESLEY and mild hyponatremia in the setting of abdominal pain and decreased p.o. hydration intake  with concern of acute on chronic pancreatitis [BC]   0844 Lactate  WNL, no me concern for acute mesenteric ischemia [BC]   0844 Magnesium  WNL [BC]   0844 Lipase  WNL, no major concern for acute pancreatitis although potential for chronic pancreatitis with exacerbation [BC]   1050 CT abdomen pelvis w IV contrast  IMPRESSION:  1.  Changes of advanced chronic/recurrent pancreatitis.  2. Questionable mild acute induration of the pancreatic head and  uncinate process. Recommend correlation with serum pancreatic enzymes.  3. Fluid collections in the pancreatic head are decreased in size  from the prior study in 2023. No new fluid collection identified.  4. Gallbladder is absent. There is considerable intrahepatic biliary  ductal dilatation. The common bile duct is dilated 8 mm and tapers at  the pancreatic head rather abruptly. These findings are slightly  progressed from the prior CT likely related to combination of post  cholecystectomy dilatation and progressive distal common bile duct  stricture by the chronic pancreatic condition.  5. Unchanged left adrenal mass.  6. Left kidney is diminutive and demonstrates delayed enhancement  compared to the right without associated obstruction. This is new  from the prior CT. Etiology is unclear.    I have personally reviewed and interpreted the images, chronic pancreatitis findings with intra/extrahepatic biliary dilatation, inflammatory changes around pancreatic head, agree with radiology final read [BC]      ED Course User Index  [BC] David Whitaker MD         Diagnoses as of 05/21/25 1050   Acute abdominal pain   Acute nausea with nonbilious vomiting   Acute on chronic pancreatitis (Multi)       Disposition   As a result of their workup, the patient will require admission to the hospital.  The patient was informed of her diagnosis.  The patient was given the opportunity to ask questions and I answered them. The patient agreed to be admitted to the  [facility].    Procedures   Procedures        David Whitaker MD  Emergency Medicine                                                            David Whitaker MD  05/21/25 2833

## 2025-05-20 NOTE — PROGRESS NOTES
05/20/25 1512   [facility] Disability Status   Are you deaf or do you have serious difficulty hearing? N   Are you blind or do you have serious difficulty seeing, even when wearing glasses? N   Because of a physical, mental, or emotional condition, do you have serious difficulty concentrating, remembering, or making decisions? (5 years old or older) N   Do you have serious difficulty walking or climbing stairs? N   Do you have serious difficulty dressing or bathing? N   Because of a physical, mental, or emotional condition, do you have serious difficulty doing errands alone such as visiting the doctor? N

## 2025-05-20 NOTE — PROGRESS NOTES
05/20/25 1504   Discharge Planning   Living Arrangements Alone   Support Systems Friends/neighbors;Family members   Assistance Needed independent with ADLs and IADLs   Type of Residence Private residence  (lives in an apartment)   Number of Stairs to Enter Residence 0   Number of Stairs Within Residence 0   Do you have animals or pets at home? No   Who is requesting discharge planning? Provider   Home or Post Acute Services None   Expected Discharge Disposition Home   Does the patient need discharge transport arranged? No   Financial Resource Strain   How hard is it for you to pay for the very basics like food, housing, medical care, and heating? Not hard   Housing Stability   In the last 12 months, was there a time when you were not able to pay the mortgage or rent on time? N   In the past 12 months, how many times have you moved where you were living? 0   At any time in the past 12 months, were you homeless or living in a shelter (including now)? N   Transportation Needs   In the past 12 months, has lack of transportation kept you from medical appointments or from getting medications? no   In the past 12 months, has lack of transportation kept you from meetings, work, or from getting things needed for daily living? No   Patient Choice   Patient / Family choosing to utilize agency / facility established prior to hospitalization No   Stroke Family Assessment   Stroke Family Assessment Needed No   Intensity of Service   Intensity of Service 0-30 min     69 y.o. female presented to the ED for epigastric pain for the last 24 to 48 hours with associated nausea with concerning PMHx of chronic pancreatitis, COPD, GERD, HTN, HLD.     Patient lives alone in an apartment. Patient has a supportive family and neighbors. Patient does not drive, brother and friends help with transport. Patient is independent with ADLs and IADLs, will have no needs upon discharge.     PLAN Home with no needs

## 2025-05-20 NOTE — CARE PLAN
Problem: Pain - Adult  Goal: Verbalizes/displays adequate comfort level or baseline comfort level  Outcome: Progressing     Problem: Safety - Adult  Goal: Free from fall injury  Outcome: Progressing     Problem: Discharge Planning  Goal: Discharge to home or other facility with appropriate resources  Outcome: Progressing     Problem: Chronic Conditions and Co-morbidities  Goal: Patient's chronic conditions and co-morbidity symptoms are monitored and maintained or improved  Outcome: Progressing     Problem: Nutrition  Goal: Nutrient intake appropriate for maintaining nutritional needs  Outcome: Progressing     Problem: Pain  Goal: Takes deep breaths with improved pain control throughout the shift  Outcome: Progressing  Goal: Turns in bed with improved pain control throughout the shift  Outcome: Progressing  Goal: Walks with improved pain control throughout the shift  Outcome: Progressing  Goal: Performs ADL's with improved pain control throughout shift  Outcome: Progressing  Goal: Participates in PT with improved pain control throughout the shift  Outcome: Progressing  Goal: Free from opioid side effects throughout the shift  Outcome: Progressing  Goal: Free from acute confusion related to pain meds throughout the shift  Outcome: Progressing     Problem: Fall/Injury  Goal: Not fall by end of shift  Outcome: Progressing

## 2025-05-21 LAB
ALBUMIN SERPL BCP-MCNC: 3.2 G/DL (ref 3.4–5)
ALP SERPL-CCNC: 87 U/L (ref 33–136)
ALT SERPL W P-5'-P-CCNC: 7 U/L (ref 7–45)
ANION GAP SERPL CALCULATED.3IONS-SCNC: 10 MMOL/L (ref 10–20)
AST SERPL W P-5'-P-CCNC: 15 U/L (ref 9–39)
BILIRUB SERPL-MCNC: 0.3 MG/DL (ref 0–1.2)
BUN SERPL-MCNC: 12 MG/DL (ref 6–23)
CALCIUM SERPL-MCNC: 8.2 MG/DL (ref 8.6–10.3)
CHLORIDE SERPL-SCNC: 100 MMOL/L (ref 98–107)
CO2 SERPL-SCNC: 25 MMOL/L (ref 21–32)
CREAT SERPL-MCNC: 1.42 MG/DL (ref 0.5–1.05)
EGFRCR SERPLBLD CKD-EPI 2021: 41 ML/MIN/1.73M*2
ERYTHROCYTE [DISTWIDTH] IN BLOOD BY AUTOMATED COUNT: 11.3 % (ref 11.5–14.5)
GLUCOSE SERPL-MCNC: 89 MG/DL (ref 74–99)
HCT VFR BLD AUTO: 27.2 % (ref 36–46)
HGB BLD-MCNC: 9.1 G/DL (ref 12–16)
MCH RBC QN AUTO: 32.4 PG (ref 26–34)
MCHC RBC AUTO-ENTMCNC: 33.5 G/DL (ref 32–36)
MCV RBC AUTO: 97 FL (ref 80–100)
NRBC BLD-RTO: 0 /100 WBCS (ref 0–0)
PLATELET # BLD AUTO: 171 X10*3/UL (ref 150–450)
POTASSIUM SERPL-SCNC: 4.4 MMOL/L (ref 3.5–5.3)
PROT SERPL-MCNC: 5.6 G/DL (ref 6.4–8.2)
RBC # BLD AUTO: 2.81 X10*6/UL (ref 4–5.2)
SODIUM SERPL-SCNC: 131 MMOL/L (ref 136–145)
WBC # BLD AUTO: 8.6 X10*3/UL (ref 4.4–11.3)

## 2025-05-21 PROCEDURE — 2500000001 HC RX 250 WO HCPCS SELF ADMINISTERED DRUGS (ALT 637 FOR MEDICARE OP): Performed by: INTERNAL MEDICINE

## 2025-05-21 PROCEDURE — 84075 ASSAY ALKALINE PHOSPHATASE: CPT | Performed by: INTERNAL MEDICINE

## 2025-05-21 PROCEDURE — 2500000002 HC RX 250 W HCPCS SELF ADMINISTERED DRUGS (ALT 637 FOR MEDICARE OP, ALT 636 FOR OP/ED): Performed by: INTERNAL MEDICINE

## 2025-05-21 PROCEDURE — 85027 COMPLETE CBC AUTOMATED: CPT | Performed by: INTERNAL MEDICINE

## 2025-05-21 PROCEDURE — 1200000002 HC GENERAL ROOM WITH TELEMETRY DAILY

## 2025-05-21 PROCEDURE — 97161 PT EVAL LOW COMPLEX 20 MIN: CPT | Mod: GP

## 2025-05-21 PROCEDURE — 2500000004 HC RX 250 GENERAL PHARMACY W/ HCPCS (ALT 636 FOR OP/ED): Performed by: INTERNAL MEDICINE

## 2025-05-21 PROCEDURE — 36415 COLL VENOUS BLD VENIPUNCTURE: CPT | Performed by: INTERNAL MEDICINE

## 2025-05-21 RX ADMIN — PAROXETINE HYDROCHLORIDE 40 MG: 20 TABLET, FILM COATED ORAL at 08:41

## 2025-05-21 RX ADMIN — HEPARIN SODIUM 5000 UNITS: 5000 INJECTION, SOLUTION INTRAVENOUS; SUBCUTANEOUS at 23:11

## 2025-05-21 RX ADMIN — PANTOPRAZOLE SODIUM 40 MG: 40 TABLET, DELAYED RELEASE ORAL at 06:00

## 2025-05-21 RX ADMIN — Medication 2 TABLET: at 21:54

## 2025-05-21 RX ADMIN — TRAZODONE HYDROCHLORIDE 100 MG: 100 TABLET ORAL at 21:54

## 2025-05-21 RX ADMIN — HEPARIN SODIUM 5000 UNITS: 5000 INJECTION, SOLUTION INTRAVENOUS; SUBCUTANEOUS at 13:02

## 2025-05-21 RX ADMIN — PANCRELIPASE 1 CAPSULE: 120000; 24000; 76000 CAPSULE, DELAYED RELEASE PELLETS ORAL at 11:17

## 2025-05-21 RX ADMIN — QUETIAPINE FUMARATE 12.5 MG: 25 TABLET ORAL at 21:54

## 2025-05-21 RX ADMIN — SUCRALFATE 1 G: 1 TABLET ORAL at 16:44

## 2025-05-21 RX ADMIN — SUCRALFATE 1 G: 1 TABLET ORAL at 21:54

## 2025-05-21 RX ADMIN — MORPHINE SULFATE 2 MG: 2 INJECTION, SOLUTION INTRAMUSCULAR; INTRAVENOUS at 11:17

## 2025-05-21 RX ADMIN — SUCRALFATE 1 G: 1 TABLET ORAL at 05:24

## 2025-05-21 RX ADMIN — HYDROXYZINE HYDROCHLORIDE 25 MG: 25 TABLET, FILM COATED ORAL at 05:24

## 2025-05-21 RX ADMIN — HEPARIN SODIUM 5000 UNITS: 5000 INJECTION, SOLUTION INTRAVENOUS; SUBCUTANEOUS at 05:24

## 2025-05-21 RX ADMIN — HYDROXYZINE HYDROCHLORIDE 25 MG: 25 TABLET, FILM COATED ORAL at 13:02

## 2025-05-21 RX ADMIN — HYDROXYZINE HYDROCHLORIDE 25 MG: 25 TABLET, FILM COATED ORAL at 21:54

## 2025-05-21 RX ADMIN — PANCRELIPASE 1 CAPSULE: 120000; 24000; 76000 CAPSULE, DELAYED RELEASE PELLETS ORAL at 16:44

## 2025-05-21 RX ADMIN — LISINOPRIL 20 MG: 20 TABLET ORAL at 08:41

## 2025-05-21 RX ADMIN — BUPROPION HYDROCHLORIDE 150 MG: 150 TABLET, EXTENDED RELEASE ORAL at 06:00

## 2025-05-21 RX ADMIN — PANCRELIPASE 1 CAPSULE: 120000; 24000; 76000 CAPSULE, DELAYED RELEASE PELLETS ORAL at 08:41

## 2025-05-21 RX ADMIN — SUCRALFATE 1 G: 1 TABLET ORAL at 11:17

## 2025-05-21 RX ADMIN — MIRTAZAPINE 7.5 MG: 7.5 TABLET, FILM COATED ORAL at 21:54

## 2025-05-21 RX ADMIN — HYDROXYZINE HYDROCHLORIDE 25 MG: 25 TABLET, FILM COATED ORAL at 16:44

## 2025-05-21 RX ADMIN — Medication 2 TABLET: at 08:41

## 2025-05-21 RX ADMIN — POTASSIUM CHLORIDE 20 MEQ: 1500 TABLET, EXTENDED RELEASE ORAL at 08:41

## 2025-05-21 RX ADMIN — Medication 1 TABLET: at 08:41

## 2025-05-21 RX ADMIN — COLESTIPOL HYDROCHLORIDE 1 G: 1 TABLET, FILM COATED ORAL at 21:54

## 2025-05-21 RX ADMIN — COLESTIPOL HYDROCHLORIDE 1 G: 1 TABLET, FILM COATED ORAL at 08:41

## 2025-05-21 ASSESSMENT — ENCOUNTER SYMPTOMS
HEADACHES: 0
TROUBLE SWALLOWING: 0
SLEEP DISTURBANCE: 0
CONSTIPATION: 0
UNEXPECTED WEIGHT CHANGE: 0
JOINT SWELLING: 0
SHORTNESS OF BREATH: 0
CHILLS: 0
LIGHT-HEADEDNESS: 0
COUGH: 0
ABDOMINAL DISTENTION: 0
COLOR CHANGE: 0
WHEEZING: 0
DIARRHEA: 0
DIFFICULTY URINATING: 0
NAUSEA: 0
CONFUSION: 0
SPEECH DIFFICULTY: 0
DIZZINESS: 0
BLOOD IN STOOL: 0
VOMITING: 0
FEVER: 0
ARTHRALGIAS: 0
ABDOMINAL PAIN: 1

## 2025-05-21 ASSESSMENT — ACTIVITIES OF DAILY LIVING (ADL): ADL_ASSISTANCE: INDEPENDENT

## 2025-05-21 ASSESSMENT — COGNITIVE AND FUNCTIONAL STATUS - GENERAL
DAILY ACTIVITIY SCORE: 24
DAILY ACTIVITIY SCORE: 24
MOBILITY SCORE: 24

## 2025-05-21 ASSESSMENT — PAIN SCALES - GENERAL
PAINLEVEL_OUTOF10: 0 - NO PAIN
PAINLEVEL_OUTOF10: 0 - NO PAIN
PAINLEVEL_OUTOF10: 8

## 2025-05-21 ASSESSMENT — PAIN - FUNCTIONAL ASSESSMENT: PAIN_FUNCTIONAL_ASSESSMENT: 0-10

## 2025-05-21 NOTE — PROGRESS NOTES
Spiritual Care Visit  Spiritual Care Request    Reason for Visit:  Routine Visit: Introduction     Request Received From:       Focus of Care:  Visited With: Patient         Refer to :          Spiritual Care Assessment    Spiritual Assessment:                      Care Provided:  Intended Effects: Establish rapport and connectedness, Build relationship of care and support, Convey a calming presence, Demonstrate caring and concern, Lessen someone's feelings of isolation  Methods: Offer emotional support  Interventions: Explain  role    Sense of Community and or Alevism Affiliation:  None         Addressed Needs/Concerns and/or Hannah Through:          Outcome:  Outcome of Spiritual Care Visit: Identifying spiritual/emotional issues, Comfort/healing presence     Advance Directives:         Spiritual Care Annotation        Annotation:  provided patient support while rounding the Unit.  introduced  services of [facility].   explained the role of the  in providing emotional and spiritual support for patient's and family while in admitted to the hospital.     Patient was sleeping on her left side when the  arrived. Patient awoke and greeted the . Patient stated that she was feeling much better, patient was brought to the hospital for pancreatitis.   offered well wishes for a quick recovery and reviewed how the patient could received support as desired.     No spiritual or Worship needs were expressed. Spiritual care will remain available for support as requested.

## 2025-05-21 NOTE — PROGRESS NOTES
Occupational Therapy -OT scren                 Therapy Communication Note    Patient Name: Jodi Johnson  MRN: [id number]  Department: [facility] 3 S  Room: 309/[facility]  Today's Date: 5/21/2025     Discipline: Occupational Therapy    Missed Visit: OT Missed Visit:  (NO)     Missed Visit Reason: Missed Visit Reason:  (OT screen)    Missed Time:     Comment: OT referral received, chart reviewed.  Pt approached. Discussed purpose of OT.  Pt reporting she is I w/ ADL's and functional mobility, stating that she had bathed this morning and has been ambulating to the bathroom I.   Pt declining any OT needs at this time  Will discharge OT services.

## 2025-05-21 NOTE — ASSESSMENT & PLAN NOTE
Acute on chronic pancreatitis  Acute renal failure  History of tobacco abuse  Marijuana abuse  Leukocytosis  Hyponatremia  Normocytic anemia  Common bile duct dilatation    Plan: Continue current medication.  Supportive care.  Physical therapy and Occupational Therapy to control pain.  Keep NPO.  Give IV fluid.  Monitor renal function panel.  The patient has been started on clear liquid diet.  Advance diet as tolerated.  The patient has been advised to quit smoking and marijuana.  Monitor white cell count.  Patient has mild hyponatremia monitor for now.  Will take Dupee, fall, aspiration, decubitus, and DVT precautions.  This has been discussed with the patient and she is agreeable to it.

## 2025-05-21 NOTE — PROGRESS NOTES
Jodi Johnson is a 69 y.o. female on day 1 of admission presenting with Acute abdominal pain.    Subjective   Patient was seen and examined.  Lying in the bed very comfortable did not look in [place] patient denied any headache, dizziness, nausea, vomiting or diarrhea, dysuria, hematuria or frequency.     Objective     Physical Exam  HEENT:  Head externally atraumatic,  extraocular movements intact, oral mucosa moist  Neck:  Supple, no JVP, no palpable adenopathy or thyromegaly.  No carotid bruit.  Chest:  Clear to auscultation and resonant.  Heart:  Regular rate and rhythm, no murmur or gallop could be appreciated.  Abdomen:  Soft, right upper quadrant and epigastric tenderness, bowel sounds present, normoactive, no palpable hepatosplenomegaly.  Extremities:  No edema, pulses present, no cyanosis or clubbing.  CNS:  Patient alert, oriented to time, place and person.    No new deficit.  Cranial nerves 2-12 grossly intact  Skin:  No active rash.  Musculoskeletal:  No  apparent joint swelling or erythema, range of movement normal.  Last Recorded Vitals  Heart Rate:  [56-62]   Temp:  [36.7 °C (98.1 °F)-36.9 °C (98.4 °F)]   Resp:  [14-16]   BP: (162-180)/(70-76)   Weight:  [37.4 kg (82 lb 8 oz)]   SpO2:  [96 %-98 %]     Intake/Output last 3 Shifts:  I/O last 3 completed shifts:  In: 2683.3 (71.7 mL/kg) [P.O.:240; I.V.:443.3 (11.8 mL/kg); IV Piggyback:2000]  Out: - (0 mL/kg)   Weight: 37.4 kg     Relevant Results  No results found for the last 90 days.    Results for orders placed or performed during the hospital encounter of 05/20/25 (from the past 24 hours)   CBC   Result Value Ref Range    WBC 8.6 4.4 - 11.3 x10*3/uL    nRBC 0.0 0.0 - 0.0 /100 WBCs    RBC 2.81 (L) 4.00 - 5.20 x10*6/uL    Hemoglobin 9.1 (L) 12.0 - 16.0 g/dL    Hematocrit 27.2 (L) 36.0 - 46.0 %    MCV 97 80 - 100 fL    MCH 32.4 26.0 - 34.0 pg    MCHC 33.5 32.0 - 36.0 g/dL    RDW 11.3 (L) 11.5 - 14.5 %    Platelets 171 150 - 450 x10*3/uL    Comprehensive metabolic panel   Result Value Ref Range    Glucose 89 74 - 99 mg/dL    Sodium 131 (L) 136 - 145 mmol/L    Potassium 4.4 3.5 - 5.3 mmol/L    Chloride 100 98 - 107 mmol/L    Bicarbonate 25 21 - 32 mmol/L    Anion Gap 10 10 - 20 mmol/L    Urea Nitrogen 12 6 - 23 mg/dL    Creatinine 1.42 (H) 0.50 - 1.05 mg/dL    eGFR 41 (L) >60 mL/min/1.73m*2    Calcium 8.2 (L) 8.6 - 10.3 mg/dL    Albumin 3.2 (L) 3.4 - 5.0 g/dL    Alkaline Phosphatase 87 33 - 136 U/L    Total Protein 5.6 (L) 6.4 - 8.2 g/dL    AST 15 9 - 39 U/L    Bilirubin, Total 0.3 0.0 - 1.2 mg/dL    ALT 7 7 - 45 U/L      Current Medications[1]   Assessment/Plan   Assessment & Plan  Acute abdominal pain  Acute on chronic pancreatitis  Acute renal failure  History of tobacco abuse  Marijuana abuse  Leukocytosis  Hyponatremia  Normocytic anemia  Common bile duct dilatation    Plan: Continue current medication.  Supportive care.  Physical therapy and Occupational Therapy to control pain.  Keep NPO.  Give IV fluid.  Monitor renal function panel.  The patient has been started on clear liquid diet.  Advance diet as tolerated.  The patient has been advised to quit smoking and marijuana.  Monitor white cell count.  Patient has mild hyponatremia monitor for now.  Will take Dupee, fall, aspiration, decubitus, and DVT precautions.  This has been discussed with the patient and she is agreeable to it.    Date of service: 5/21/2025    Continue current medication patient pain is slightly better.  Patient still having pain with the eating.  Advance diet as tolerated.  Continue to control pain.  Use Poligrip.  Monitor renal function.  Patient has drop in hemoglobin hematocrit.  That is most likely due to hemodilution.  Leukocytosis resolved.  Continue monitor renal function panel.  We will take DVT, fall, aspiration, decubitus, and DVT precautions.           Cesar Rader MD            [1]   Current Facility-Administered Medications:     acetaminophen (Tylenol)  tablet 650 mg, 650 mg, oral, q4h PRN **OR** acetaminophen (Tylenol) oral liquid 650 mg, 650 mg, oral, q4h PRN **OR** acetaminophen (Tylenol) suppository 650 mg, 650 mg, rectal, q4h PRN, Cesar Rader MD    benzocaine-menthol (Cepastat Sore Throat) lozenge 1 lozenge, 1 lozenge, Mouth/Throat, q2h PRN, Cesar Rader MD    buPROPion XL (Wellbutrin XL) 24 hr tablet 150 mg, 150 mg, oral, Daily before breakfast, Cesar Rader MD, 150 mg at 05/21/25 0600    calcium carbonate-vitamin D3 500 mg-5 mcg (200 unit) tablet 2 tablet, 2 tablet, oral, BID, Cesar Rader MD, 2 tablet at 05/21/25 0841    colestipol (Colestid) tablet 1 g, 1 g, oral, BID, Cesar Rader MD, 1 g at 05/21/25 0841    cyclobenzaprine (Flexeril) tablet 10 mg, 10 mg, oral, BID PRN, Cesar Rader MD    dextromethorphan-guaifenesin (Robitussin DM)  mg/5 mL oral liquid 5 mL, 5 mL, oral, q4h PRN, Cesar Rader MD    diclofenac sodium (Voltaren) 1 % gel 4 g, 4 g, Topical, 4x daily PRN, Cesar Rader MD    guaiFENesin (Mucinex) 12 hr tablet 600 mg, 600 mg, oral, q12h PRN, Cesar Rader MD    heparin (porcine) injection 5,000 Units, 5,000 Units, subcutaneous, q8h JENI, Cesar Rader MD, 5,000 Units at 05/21/25 1302    hydrOXYzine HCL (Atarax) tablet 25 mg, 25 mg, oral, 4x daily, Cesar Rader MD, 25 mg at 05/21/25 1644    lipase-protease-amylase (Creon) 24,000-76,000 -120,000 unit per capsule 1 capsule, 1 capsule, oral, TID, Cesar Rader MD, 1 capsule at 05/21/25 1644    lisinopril tablet 20 mg, 20 mg, oral, Daily, Cesar Rader MD, 20 mg at 05/21/25 0841    magnesium oxide (Mag-Ox) 400 mg (241.3 mg elemental) tablet 1 tablet, 400 mg of magnesium oxide, oral, Daily, Cesar Rader MD, 1 tablet at 05/21/25 0841    melatonin tablet 6 mg, 6 mg, oral, Nightly PRN, Csear Rader MD    mirtazapine (Remeron) tablet 7.5 mg, 7.5 mg, oral, Nightly, Cesar Rader MD, 7.5 mg at  05/20/25 2022    morphine injection 2 mg, 2 mg, intravenous, q4h PRN, Cesar Rader MD, 2 mg at 05/21/25 1117    pantoprazole (ProtoNix) EC tablet 40 mg, 40 mg, oral, Daily before breakfast, 40 mg at 05/21/25 0600 **OR** pantoprazole (Protonix) injection 40 mg, 40 mg, intravenous, Daily before breakfast, Cesar Rader MD    PARoxetine (Paxil) tablet 40 mg, 40 mg, oral, Daily, Cesar Rader MD, 40 mg at 05/21/25 0841    polyethylene glycol (Glycolax, Miralax) packet 17 g, 17 g, oral, Daily PRN, Cesar Rader MD    potassium chloride CR (Klor-Con M20) ER tablet 20 mEq, 20 mEq, oral, Daily, Cesar Rader MD, 20 mEq at 05/21/25 0841    QUEtiapine (SEROquel) tablet 12.5 mg, 12.5 mg, oral, Nightly, Cesar Rader MD, 12.5 mg at 05/20/25 2022    sucralfate (Carafate) tablet 1 g, 1 g, oral, Before meals & nightly, Cesar Rader MD, 1 g at 05/21/25 1644    traZODone (Desyrel) tablet 100 mg, 100 mg, oral, Nightly, Cesar Rader MD, 100 mg at 05/20/25 2022

## 2025-05-21 NOTE — H&P
History Of Present Illness  Jodi Johnson is a 69 y.o. female presenting with complaint of abdominal pain..  The pain was present in the epigastric area, 8-9/10 intensity, no aggravating or leaving factors, associate with nausea but no vomiting.  No diarrhea,.  In the emergency room initial workup was done and patient found to have acute pancreatitis.  The patient had a sodium of 132, potassium 5.1, creatinine 1.51, glucose 150, WBC 14.5, hemoglobin 11.2, K35.4 And platelets 215.  Lipase was 31.  CAT scan of the abdomen and pelvis showed questionable mild acute induration of the pancreatic head and uncinate process.  Fluid collection in the pancreatic head has decreased in size from prior study of 2023.  No new collection.  Gallbladder was absent.  There is considerable intrahepatic biliary ductal dilatation.  The common bile duct was 8 mm and tapers at the pancreatic head rather abruptly.  This finding has slightly progressed from the prior CT scan likely related to combination of postcholecystectomy dilatation and progressive distal common bile duct stricture chronic pancreatic condition.  The patient was admitted to the floor for further management.  Past Medical History  Medical History[1]    Surgical History  Surgical History[2]     Social History  She reports that she has been smoking cigarettes. She started smoking about 48 years ago. She has a 96.8 pack-year smoking history. She has never been exposed to tobacco smoke. She has never used smokeless tobacco. She reports that she does not currently use alcohol. She reports current drug use. Drug: Marijuana.    Family History  Family History[3]     Allergies  Penicillins    Review of Systems  I reviewed all systems reviewed as above otherwise is negative.  Physical Exam  HEENT:  Head externally atraumatic, no pallor, no icterus, extraocular movements intact, pupils reactive to light, oral mucosa moist and throat clear.  Neck:  Supple, no JVP, no palpable  adenopathy or thyromegaly.  No carotid bruit.  Chest:  Clear to auscultation and resonant.  Heart:  Regular rate and rhythm, no murmur or gallop could be appreciated.  Abdomen:  Soft, mild epigastric and right upper quadrant tenderness, bowel sounds present, normoactive, no palpable hepatosplenomegaly.  Extremities:  No edema, pulses present, no cyanosis or clubbing.  CNS:  Patient alert, oriented to time, place and person.  Power 5/5 all over and deep tendon reflexes symmetrical, cranial nerves 2-12 grossly intact.  Skin:  No active rash.  Musculoskeletal:  No joint swelling or erythema, range of movement normal.  Last Recorded Vitals  Heart Rate:  [63-85]   Temp:  [36.5 °C (97.7 °F)-36.8 °C (98.2 °F)]   Resp:  [16-20]   BP: (156-178)/(72-92)   Height:  [152.4 cm (5')]   Weight:  [42.2 kg (93 lb)]   SpO2:  [95 %-98 %]       Relevant Results        Results for orders placed or performed during the hospital encounter of 05/20/25 (from the past 24 hours)   CBC and Auto Differential   Result Value Ref Range    WBC 14.5 (H) 4.4 - 11.3 x10*3/uL    nRBC 0.0 0.0 - 0.0 /100 WBCs    RBC 3.49 (L) 4.00 - 5.20 x10*6/uL    Hemoglobin 11.2 (L) 12.0 - 16.0 g/dL    Hematocrit 34.4 (L) 36.0 - 46.0 %    MCV 99 80 - 100 fL    MCH 32.1 26.0 - 34.0 pg    MCHC 32.6 32.0 - 36.0 g/dL    RDW 11.9 11.5 - 14.5 %    Platelets 215 150 - 450 x10*3/uL    Neutrophils % 82.6 40.0 - 80.0 %    Immature Granulocytes %, Automated 0.6 0.0 - 0.9 %    Lymphocytes % 10.6 13.0 - 44.0 %    Monocytes % 5.3 2.0 - 10.0 %    Eosinophils % 0.5 0.0 - 6.0 %    Basophils % 0.4 0.0 - 2.0 %    Neutrophils Absolute 11.95 (H) 1.20 - 7.70 x10*3/uL    Immature Granulocytes Absolute, Automated 0.09 0.00 - 0.70 x10*3/uL    Lymphocytes Absolute 1.54 1.20 - 4.80 x10*3/uL    Monocytes Absolute 0.76 0.10 - 1.00 x10*3/uL    Eosinophils Absolute 0.07 0.00 - 0.70 x10*3/uL    Basophils Absolute 0.06 0.00 - 0.10 x10*3/uL   Magnesium   Result Value Ref Range    Magnesium 1.77 1.60 -  2.40 mg/dL   Comprehensive metabolic panel   Result Value Ref Range    Glucose 150 (H) 74 - 99 mg/dL    Sodium 132 (L) 136 - 145 mmol/L    Potassium 5.1 3.5 - 5.3 mmol/L    Chloride 101 98 - 107 mmol/L    Bicarbonate 23 21 - 32 mmol/L    Anion Gap 13 10 - 20 mmol/L    Urea Nitrogen 15 6 - 23 mg/dL    Creatinine 1.51 (H) 0.50 - 1.05 mg/dL    eGFR 38 (L) >60 mL/min/1.73m*2    Calcium 9.5 8.6 - 10.3 mg/dL    Albumin 4.2 3.4 - 5.0 g/dL    Alkaline Phosphatase 95 33 - 136 U/L    Total Protein 7.3 6.4 - 8.2 g/dL    AST 12 9 - 39 U/L    Bilirubin, Total 0.4 0.0 - 1.2 mg/dL    ALT 7 7 - 45 U/L   Lipase   Result Value Ref Range    Lipase 31 9 - 82 U/L   Lactate   Result Value Ref Range    Lactate 1.2 0.4 - 2.0 mmol/L   ECG 12 lead   Result Value Ref Range    Ventricular Rate 88 BPM    Atrial Rate 88 BPM    NE Interval 112 ms    QRS Duration 80 ms    QT Interval 338 ms    QTC Calculation(Bazett) 408 ms    P Axis 75 degrees    R Axis 71 degrees    T Axis 75 degrees    QRS Count 15 beats    Q Onset 209 ms    P Onset 153 ms    P Offset 196 ms    T Offset 378 ms    QTC Fredericia 384 ms   Urinalysis with Reflex Microscopic   Result Value Ref Range    Color, Urine Colorless (N) Light-Yellow, Yellow, Dark-Yellow    Appearance, Urine Clear Clear    Specific Gravity, Urine 1.018 1.005 - 1.035    pH, Urine 7.0 5.0, 5.5, 6.0, 6.5, 7.0, 7.5, 8.0    Protein, Urine 20 (TRACE) NEGATIVE, 10 (TRACE), 20 (TRACE) mg/dL    Glucose, Urine Normal Normal mg/dL    Blood, Urine NEGATIVE NEGATIVE mg/dL    Ketones, Urine NEGATIVE NEGATIVE mg/dL    Bilirubin, Urine NEGATIVE NEGATIVE mg/dL    Urobilinogen, Urine Normal Normal mg/dL    Nitrite, Urine NEGATIVE NEGATIVE    Leukocyte Esterase, Urine NEGATIVE NEGATIVE   Microscopic Only, Urine   Result Value Ref Range    WBC, Urine NONE 1-5, NONE /HPF    RBC, Urine NONE NONE, 1-2, 3-5 /HPF     Prior to Admission medications    Medication Sig Start Date End Date Taking? Authorizing Provider   colestipol  (Colestid) 1 gram tablet Take 1 tablet (1 g) by mouth 2 times a day. 4/3/24  Yes Miller Mcrae DO   lisinopril 20 mg tablet Take 1 tablet (20 mg) by mouth once daily.   Yes Historical Provider, MD   magnesium oxide (Mag-Ox) 400 mg (241.3 mg magnesium) tablet TAKE ONE TABLET BY MOUTH TWICE DAILY 10/18/24  Yes Carlotta Noel MD   melatonin 3 mg capsule Take 1 capsule by mouth once daily at bedtime.   Yes Historical Provider, MD   mirtazapine (Remeron) 15 mg tablet 1/2 tablet at bedtime Orally Once a day for 90 day(s) 10/12/23  Yes Carlotta Noel MD   pantoprazole (ProtoNix) 40 mg EC tablet TAKE ONE TABLET BY MOUTH ONCE DAILY 5/2/24  Yes Carlotta Noel MD   potassium chloride CR 20 mEq ER tablet TAKE ONE TABLET BY MOUTH ONCE A DAY 10/18/24  Yes Carlotta Noel MD   QUEtiapine (SEROquel) 25 mg tablet Take 0.5 tablets (12.5 mg) by mouth once daily at bedtime.   Yes Historical Provider, MD   sucralfate (Carafate) 1 gram tablet Take 1 tablet (1 g) by mouth 2 times a day. Lunch and bedtime   Yes Historical Provider, MD   traZODone (Desyrel) 100 mg tablet Take 1 tablet (100 mg) by mouth once daily at bedtime.   Yes Historical Provider, MD   buPROPion XL (Wellbutrin XL) 150 mg 24 hr tablet Take 1 tablet (150 mg) by mouth once daily in the morning. Take before meals. 3/5/24   Historical Provider, MD   calcium carbonate-vitamin D3 600 mg-20 mcg (800 unit) tablet Take 1 tablet by mouth once daily.    Historical Provider, MD   calcium citrate-vitamin D3 (Citracal+D) 315 mg-5 mcg (200 unit) tablet   10/19/20   Historical Provider, MD   cyclobenzaprine (Flexeril) 10 mg tablet Take 1 tablet (10 mg) by mouth 2 times a day as needed for muscle spasms for up to 10 days. 10/4/23 10/14/23  Iris Garcia MD   diclofenac sodium 1 % kit 2 grams Transdermal every 4 hours for 30 day(s) 11/22/22   Historical Provider, MD   lipase-protease-amylase (Creon) 24,000-76,000 -120,000 unit capsule TAKE 1 CAPSULE BY MOUTH THREE TIMES DAILY  WITH MEALS 12/14/24   Carlotta Noel MD   multivit-minerals/folic acid (ADULT ONE DAILY MULTIVITAMIN ORAL) 1 tablet Orally Once a day    Historical Provider, MD   naproxen (Naprosyn) 500 mg tablet Take 1 tablet (500 mg) by mouth 2 times a day as needed for mild pain (1 - 3). 12/12/23   Carlotta Noel MD   ondansetron ODT (Zofran-ODT) 4 mg disintegrating tablet Take 1 tablet (4 mg) by mouth every 8 hours if needed for nausea. 3/25/24   Carlotta Noel MD   pancrelipase, Lip-Prot-Amyl, (Creon) 12,000-38,000 -60,000 unit capsule   9/12/20   Historical Provider, MD   PARoxetine (Paxil) 40 mg tablet Take 1 tablet (40 mg) by mouth once daily. 10/12/23 10/11/24  Carlotta Noel MD   traZODone (Desyrel) 100 mg tablet Take 3 tablets (300 mg) by mouth once daily. 1/9/24 1/8/25  Carlotta Noel MD   hydrOXYzine HCL (Atarax) 25 mg tablet   9/17/21 5/20/25  Historical Provider, MD     Current Medications[4]  Imaging  CT abdomen pelvis w IV contrast  Result Date: 5/20/2025  1.  Changes of advanced chronic/recurrent pancreatitis. 2. Questionable mild acute induration of the pancreatic head and uncinate process. Recommend correlation with serum pancreatic enzymes. 3. Fluid collections in the pancreatic head are decreased in size from the prior study in 2023. No new fluid collection identified. 4. Gallbladder is absent. There is considerable intrahepatic biliary ductal dilatation. The common bile duct is dilated 8 mm and tapers at the pancreatic head rather abruptly. These findings are slightly progressed from the prior CT likely related to combination of post cholecystectomy dilatation and progressive distal common bile duct stricture by the chronic pancreatic condition. 5. Unchanged left adrenal mass. 6. Left kidney is diminutive and demonstrates delayed enhancement compared to the right without associated obstruction. This is new from the prior CT. Etiology is unclear.     MACRO: None   Signed by: Miller Lugo 5/20/2025 10:03 AM  Dictation workstation:   [id number]      Cardiology, Vascular, and Other Imaging  ECG 12 lead  Result Date: 5/20/2025  Normal sinus rhythm Right atrial enlargement Borderline ECG When compared with ECG of 26-APR-2023 21:56, Left posterior fascicular block is no longer Present      No results found for the last 90 days.       Assessment/Plan   Assessment & Plan  Acute abdominal pain  Acute on chronic pancreatitis  Acute renal failure  History of tobacco abuse  Marijuana abuse  Leukocytosis  Hyponatremia  Normocytic anemia  Common bile duct dilatation    Plan: Continue current medication.  Supportive care.  Physical therapy and Occupational Therapy to control pain.  Keep NPO.  Give IV fluid.  Monitor renal function panel.  The patient has been started on clear liquid diet.  Advance diet as tolerated.  The patient has been advised to quit smoking and marijuana.  Monitor white cell count.  Patient has mild hyponatremia monitor for now.  Will take Dupee, fall, aspiration, decubitus, and DVT precautions.  This has been discussed with the patient and she is agreeable to it.                 Cesar Rader MD        [1]   Past Medical History:  Diagnosis Date    Age-related nuclear cataract of both eyes     Irregular astigmatism, bilateral     Unspecified disorder of refraction    [2]   Past Surgical History:  Procedure Laterality Date    BREAST LUMPECTOMY  05/24/2013    Breast Surgery Lumpectomy    COLONOSCOPY  2017    normal. Due in 2027    CT GUIDED PERCUTANEOUS BIOPSY MEDIASTINUM  06/24/2022    CT GUIDED PERCUTANEOUS BIOPSY MEDIASTINUM 6/24/2022 [facility]    US GUIDED ABSCESS FLUID COLLECTION DRAINAGE  06/06/2022    US GUIDED ABSCESS FLUID COLLECTION DRAINAGE 6/6/2022 [facility]    US GUIDED ABSCESS FLUID COLLECTION DRAINAGE  06/06/2022    US GUIDED ABSCESS FLUID COLLECTION DRAINAGE 6/6/2022 [facility]   [3]   Family History  Problem Relation Name Age of Onset    Heart disease Mother   78    Hypertension Mother      Heart disease Father      Other (pacemaker) Father      Coronary artery disease Brother  55    Other (stent pacement) Brother     [4]   Current Facility-Administered Medications:     acetaminophen (Tylenol) tablet 650 mg, 650 mg, oral, q4h PRN **OR** acetaminophen (Tylenol) oral liquid 650 mg, 650 mg, oral, q4h PRN **OR** acetaminophen (Tylenol) suppository 650 mg, 650 mg, rectal, q4h PRN, Cesar Rader MD    benzocaine-menthol (Cepastat Sore Throat) lozenge 1 lozenge, 1 lozenge, Mouth/Throat, q2h PRN, Cesar Rader MD    [START ON 5/21/2025] buPROPion XL (Wellbutrin XL) 24 hr tablet 150 mg, 150 mg, oral, Daily before breakfast, Cesar Rader MD    calcium carbonate-vitamin D3 500 mg-5 mcg (200 unit) tablet 2 tablet, 2 tablet, oral, BID, Cesar Rader MD, 2 tablet at 05/20/25 2022    colestipol (Colestid) tablet 1 g, 1 g, oral, BID, Cesar Rader MD, 1 g at 05/20/25 2022    cyclobenzaprine (Flexeril) tablet 10 mg, 10 mg, oral, BID PRN, Cesar Rader MD    dextromethorphan-guaifenesin (Robitussin DM)  mg/5 mL oral liquid 5 mL, 5 mL, oral, q4h PRN, Cesar Rader MD    dextrose 5%-0.45 % sodium chloride infusion, 100 mL/hr, intravenous, Continuous, Cesar Rader MD, Last Rate: 100 mL/hr at 05/20/25 2122, 100 mL/hr at 05/20/25 2122    diclofenac sodium (Voltaren) 1 % gel 4 g, 4 g, Topical, 4x daily PRN, Cesar Rader MD    guaiFENesin (Mucinex) 12 hr tablet 600 mg, 600 mg, oral, q12h PRN, Cesar Rader MD    heparin (porcine) injection 5,000 Units, 5,000 Units, subcutaneous, q8h JENI, Cesar Rader MD, 5,000 Units at 05/20/25 1523    hydrOXYzine HCL (Atarax) tablet 25 mg, 25 mg, oral, 4x daily, Cesar Rader MD, 25 mg at 05/20/25 2022    lipase-protease-amylase (Creon) 24,000-76,000 -120,000 unit per capsule 1 capsule, 1 capsule, oral, TID, Cesar Rader MD, 1 capsule at 05/20/25 1700    [START ON  5/21/2025] lisinopril tablet 20 mg, 20 mg, oral, Daily, Cesar Rader MD    magnesium oxide (Mag-Ox) 400 mg (241.3 mg elemental) tablet 1 tablet, 400 mg of magnesium oxide, oral, Daily, Cesar Rader MD, 1 tablet at 05/20/25 1523    melatonin tablet 6 mg, 6 mg, oral, Nightly PRN, Cesar Rader MD    mirtazapine (Remeron) tablet 7.5 mg, 7.5 mg, oral, Nightly, Cesar Rader MD, 7.5 mg at 05/20/25 2022    morphine injection 2 mg, 2 mg, intravenous, q4h PRN, Cesar Rader MD, 2 mg at 05/20/25 1517    [START ON 5/21/2025] pantoprazole (ProtoNix) EC tablet 40 mg, 40 mg, oral, Daily before breakfast **OR** [START ON 5/21/2025] pantoprazole (Protonix) injection 40 mg, 40 mg, intravenous, Daily before breakfast, Cesar Rader MD    PARoxetine (Paxil) tablet 40 mg, 40 mg, oral, Daily, Cesar Rader MD, 40 mg at 05/20/25 1523    polyethylene glycol (Glycolax, Miralax) packet 17 g, 17 g, oral, Daily PRN, Cesar Rader MD    [START ON 5/21/2025] potassium chloride CR (Klor-Con M20) ER tablet 20 mEq, 20 mEq, oral, Daily, Cesar Rader MD    QUEtiapine (SEROquel) tablet 12.5 mg, 12.5 mg, oral, Nightly, Cesar Rader MD, 12.5 mg at 05/20/25 2022    sucralfate (Carafate) tablet 1 g, 1 g, oral, Before meals & nightly, Cesar Rader MD, 1 g at 05/20/25 2022    traZODone (Desyrel) tablet 100 mg, 100 mg, oral, Nightly, Cesar Rader MD, 100 mg at 05/20/25 2022

## 2025-05-21 NOTE — CARE PLAN
The patient's goals for the shift include      The clinical goals for the shift include safety

## 2025-05-21 NOTE — CONSULTS
Consults    Reason For Consult  Pancreatitis    History Of Present Illness  Jodi Johnson is a 69 y.o. female presenting with epigastric pain. Recurrent history of acute on chronic pancreatitis. This admission, CT showing chronic pancreatic changes, calcifications, ductal dilation, with new fat stranding around pancreatic head. Lipase and LFTs are normal. Multiple ERCPs since 2021 showing benign pancreatic and common bile duct strictures. She used to drink alcohol heavily. She has been sober for years. Still smoking cigarettes. Abdominal pain improved today.      Past Medical History  She has a past medical history of Age-related nuclear cataract of both eyes, Irregular astigmatism, bilateral, and Unspecified disorder of refraction.    Surgical History  She has a past surgical history that includes Breast lumpectomy (05/24/2013); US guided abscess fluid collection drainage (06/06/2022); US guided abscess fluid collection drainage (06/06/2022); CT guided percutaneous biopsy mediastinum (06/24/2022); and Colonoscopy (2017).     Social History  She reports that she has been smoking cigarettes. She started smoking about 48 years ago. She has a 96.8 pack-year smoking history. She has never been exposed to tobacco smoke. She has never used smokeless tobacco. She reports that she does not currently use alcohol. She reports current drug use. Drug: Marijuana.    Family History  Family History[1]     Allergies  Penicillins    Review of Systems   Constitutional:  Negative for chills, fever and unexpected weight change.   HENT:  Negative for congestion and trouble swallowing.    Respiratory:  Negative for cough, shortness of breath and wheezing.    Cardiovascular:  Negative for chest pain.   Gastrointestinal:  Positive for abdominal pain. Negative for abdominal distention, blood in stool, constipation, diarrhea, nausea and vomiting.   Genitourinary:  Negative for difficulty urinating.   Musculoskeletal:  Negative for  arthralgias and joint swelling.   Skin:  Negative for color change.   Neurological:  Negative for dizziness, speech difficulty, light-headedness and headaches.   Psychiatric/Behavioral:  Negative for confusion and sleep disturbance.         Physical Exam  Constitutional:       Appearance: Normal appearance.   HENT:      Head: Normocephalic and atraumatic.      Mouth/Throat:      Mouth: Mucous membranes are moist.   Pulmonary:      Effort: Pulmonary effort is normal.   Abdominal:      General: There is no distension.      Palpations: Abdomen is soft.      Tenderness: There is abdominal tenderness. There is no guarding.   Musculoskeletal:         General: Normal range of motion.      Cervical back: Normal range of motion.   Skin:     General: Skin is warm and dry.   Neurological:      General: No focal deficit present.      Mental Status: She is alert. Mental status is at baseline.   Psychiatric:         Mood and Affect: Mood normal.          Last Recorded Vitals  Blood pressure 162/70, pulse 62, temperature 36.9 °C (98.4 °F), temperature source Temporal, resp. rate 16, height 1.524 m (5'), weight (!) 42.2 kg (93 lb), SpO2 96%.    Relevant Results  Results for orders placed or performed during the hospital encounter of 05/20/25 (from the past 24 hours)   Urinalysis with Reflex Microscopic   Result Value Ref Range    Color, Urine Colorless (N) Light-Yellow, Yellow, Dark-Yellow    Appearance, Urine Clear Clear    Specific Gravity, Urine 1.018 1.005 - 1.035    pH, Urine 7.0 5.0, 5.5, 6.0, 6.5, 7.0, 7.5, 8.0    Protein, Urine 20 (TRACE) NEGATIVE, 10 (TRACE), 20 (TRACE) mg/dL    Glucose, Urine Normal Normal mg/dL    Blood, Urine NEGATIVE NEGATIVE mg/dL    Ketones, Urine NEGATIVE NEGATIVE mg/dL    Bilirubin, Urine NEGATIVE NEGATIVE mg/dL    Urobilinogen, Urine Normal Normal mg/dL    Nitrite, Urine NEGATIVE NEGATIVE    Leukocyte Esterase, Urine NEGATIVE NEGATIVE   Microscopic Only, Urine   Result Value Ref Range    WBC,  Urine NONE 1-5, NONE /HPF    RBC, Urine NONE NONE, 1-2, 3-5 /HPF   CBC   Result Value Ref Range    WBC 8.6 4.4 - 11.3 x10*3/uL    nRBC 0.0 0.0 - 0.0 /100 WBCs    RBC 2.81 (L) 4.00 - 5.20 x10*6/uL    Hemoglobin 9.1 (L) 12.0 - 16.0 g/dL    Hematocrit 27.2 (L) 36.0 - 46.0 %    MCV 97 80 - 100 fL    MCH 32.4 26.0 - 34.0 pg    MCHC 33.5 32.0 - 36.0 g/dL    RDW 11.3 (L) 11.5 - 14.5 %    Platelets 171 150 - 450 x10*3/uL   Comprehensive metabolic panel   Result Value Ref Range    Glucose 89 74 - 99 mg/dL    Sodium 131 (L) 136 - 145 mmol/L    Potassium 4.4 3.5 - 5.3 mmol/L    Chloride 100 98 - 107 mmol/L    Bicarbonate 25 21 - 32 mmol/L    Anion Gap 10 10 - 20 mmol/L    Urea Nitrogen 12 6 - 23 mg/dL    Creatinine 1.42 (H) 0.50 - 1.05 mg/dL    eGFR 41 (L) >60 mL/min/1.73m*2    Calcium 8.2 (L) 8.6 - 10.3 mg/dL    Albumin 3.2 (L) 3.4 - 5.0 g/dL    Alkaline Phosphatase 87 33 - 136 U/L    Total Protein 5.6 (L) 6.4 - 8.2 g/dL    AST 15 9 - 39 U/L    Bilirubin, Total 0.3 0.0 - 1.2 mg/dL    ALT 7 7 - 45 U/L     ECG 12 lead  Result Date: 5/20/2025  Normal sinus rhythm Right atrial enlargement When compared with ECG of 26-APR-2023 21:56, Left posterior fascicular block is no longer Present Confirmed by Fritz Lopez ([id number]) on 5/20/2025 11:00:05 PM    CT abdomen pelvis w IV contrast  Result Date: 5/20/2025  Interpreted By:  Miller Lugo, STUDY: CT ABDOMEN PELVIS W IV CONTRAST;  5/20/2025 9:36 am   INDICATION: Signs/Symptoms:Epigastric pain with history of pancreatitis     COMPARISON: CT abdomen and pelvis 05/26/2023.   ACCESSION NUMBER(S): [id number]   ORDERING CLINICIAN: ELIEL HARRIS   TECHNIQUE: CT of the abdomen and pelvis was performed. Contiguous axial images were obtained at  3 mm through the abdomen and pelvis. Coronal and sagittal reconstructions at  3 mm slice thickness were performed.   FINDINGS: LUNG BASES: No effusion, suspicious mass or consolidation.   ABDOMEN: No free air. Shrunken pancreas with extensive  parenchymal calcifications and beaded appearance of the main pancreatic duct. Small low-density collections along the pancreatic head, the more medial measuring 13 x 11 mm greatest axial dimensions in the more right lateral measuring 9 x 8 mm greatest axial dimensions. These previously measured 23 x 19 mm and 13 x 10 mm respectively. There is a heterogeneous possibly indurated appearance of the pancreatic head and uncinate process. No evidence of newly developing pseudocyst.   Gallbladder is absent. There is considerable intrahepatic biliary ductal dilatation. The common bile duct is dilated 8 mm and tapers at the pancreatic head rather abruptly. These findings are slightly progressed from the prior CT likely related to combination of post cholecystectomy dilatation and progressive distal common bile duct stricture by the chronic pancreatic condition.     Stomach unremarkable. Spleen normal size without mass. Heterogeneous left adrenal mass with calcifications measures 31 x 19 mm greatest axial dimensions and is unchanged, previously 30 x 20 mm.   Small and large bowel are normal in caliber and wall thickness without evidence for obstruction, mass, or inflammation. Appendix appears normal.   No suspicious renal mass. No hydronephrosis. No kidney stones. Left kidney is diminutive and demonstrates delayed enhancement compared to the right without associated obstruction. This is new from the prior CT. No ureteral stones.   PELVIS: No significant  pelvic free fluid. Uterus unremarkable. No evident bladder mass or bladder stone. No distal ureteral dilatation or stone.     Intrapelvic bowel normal in caliber and wall thickness.     BONES AND VESSELS: No destructive osseous lesions or acute osseous abnormalities. Again demonstrated is bilateral L5 spondylolysis with associated grade 1 anterolisthesis L5 on S1, severe degenerative disc disease at that level and moderately severe bilateral foraminal stenosis at that level.  Moderately advanced aortoiliac atherosclerosis. No aneurysms.       1.  Changes of advanced chronic/recurrent pancreatitis. 2. Questionable mild acute induration of the pancreatic head and uncinate process. Recommend correlation with serum pancreatic enzymes. 3. Fluid collections in the pancreatic head are decreased in size from the prior study in 2023. No new fluid collection identified. 4. Gallbladder is absent. There is considerable intrahepatic biliary ductal dilatation. The common bile duct is dilated 8 mm and tapers at the pancreatic head rather abruptly. These findings are slightly progressed from the prior CT likely related to combination of post cholecystectomy dilatation and progressive distal common bile duct stricture by the chronic pancreatic condition. 5. Unchanged left adrenal mass. 6. Left kidney is diminutive and demonstrates delayed enhancement compared to the right without associated obstruction. This is new from the prior CT. Etiology is unclear.     MACRO: None   Signed by: Miller Lugo 5/20/2025 10:03 AM Dictation workstation:   [id number]         Assessment/Plan     Acute on Chronic Pancreatitis, Abnormal CT   Appears acute on chronic pancreatitis given new inflammation at pancreatic head. Lipase noted, normal. 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     Currently, her LFTs are normal. Pain improved. I spoke with Dr Mcrae. Continue supportive care. Plan for outpatient repeat advanced endoscopy, consider pancreatic duct stenting     She is sober from alcohol. Still smoking cigarettes. Advised cessation     I spent 30 minutes in the professional and overall care of this patient.               [1]   Family History  Problem Relation Name Age of Onset    Heart disease Mother  78    Hypertension Mother      Heart disease Father      Other (pacemaker) Father      Coronary artery disease Brother  55    Other (stent  pacement) Brother

## 2025-05-21 NOTE — PROGRESS NOTES
05/21/25 1141   Discharge Planning   Home or Post Acute Services None   Expected Discharge Disposition Home     Spoek with pt, role of TCC explained. Pt confirms her plan on discharge is home and that she is without CT needs. Will ask to see if SW needs to see pt for tobacco and marijuana use. CT will follow.

## 2025-05-21 NOTE — CONSULTS
"Nutrition Assessement Note    Nutrition Assessment    Reason for Assessment: Admission nursing screening    Reason for Hospital Admission:  Jodi Johnson is a 69 y.o. female who is admitted for acute abdominal pain, pancreatitis.     Malnutrition Screening Tool (MST)  Have you recently lost weight without trying?: Yes  If yes, how much weight have you lost?: Lost 14 - 23 pounds  Weight Loss Score: 2  Have you been eating poorly because of a decreased appetite?: Yes  Malnutrition Score: 3  Nutrition Screen  Stage 3 or 4 Pressure Injury or Multiple Non-Healing Wounds: No  Home Tube Feeding or Total Parenteral Nutrition (TPN): No  Dietitian Consult Needed: Yes (Comment)  Reason for Consult: Rapid weight loss    Medical History[1]   Surgical History[2]    Nutrition History:  Energy Intake: Poor < 50 %  Food and Nutrient History: pt reprots having a decreased appetite for weeks to months. states she can make food but when she goes to eat, she is unable to put the food in her mouth. states she has never been a big eater - reports \"grazing\" during the day. drinks ensure once daily at home and is agreeable to ensure at this time.    Anthropometrics:  Ht: 152.4 cm (5'), Wt: (!) 37.4 kg (82 lb 8 oz), BMI: 16.11    Weight Change:  Daily Weight  05/21/25 : (!) 37.4 kg (82 lb 8 oz)  04/12/24 : (!) 42.2 kg (93 lb 0.6 oz)  10/13/23 : (!) 40.9 kg (90 lb 2.7 oz)  10/12/23 : (!) 42.2 kg (93 lb)  10/03/23 : (!) 39.1 kg (86 lb 3.2 oz)  08/23/23 : (!) 37.2 kg (82 lb)  07/31/23 : (!) 37.6 kg (83 lb)  06/09/23 : (!) 40.2 kg (88 lb 9.6 oz)  05/08/23 : (!) 40.5 kg (89 lb 5 oz)  04/13/23 : (!) 44 kg (97 lb)    Weight History / % Weight Change: pt reports wt of 79# at home. updated wt obtained at this visit. pt with a 9# (9.6%) wt loss over ~1.5 months (4/12-5/21)  Significant Weight Loss: Yes    Nutrition Focused Physical Exam Findings:   Subcutaneous Fat Loss  Orbital Fat Pads: Mild-Moderate (slight dark circles and slight " hollowing)  Buccal Fat Pads: Mild-Moderate (flat cheeks, minimal bounce)    Muscle Wasting  Temporalis: Mild-Moderate (slight depression)  Pectoralis (Clavicular Region): Severe (protruding prominent clavicle)  Deltoid/Trapezius: Mild-Moderate (slight protrusion of acromion process)  Interosseous: Mild-Moderate (slightly depressed area between thumb and forefinger)  Trapezius/Infraspinatus/Supraspinatus (Scapular Region): Mild-Moderate (slight protrusion of scapula)    Physical Findings  Hair: Positive (increased rate of hair loss over a couple months)  Nails: Negative  Skin: Negative    Nutrition Significant Labs:  Lab Results   Component Value Date    WBC 8.6 05/21/2025    HGB 9.1 (L) 05/21/2025    HCT 27.2 (L) 05/21/2025     05/21/2025    CHOL 159 07/31/2023    TRIG 119 07/31/2023    HDL 49 (L) 07/31/2023    ALT 7 05/21/2025    AST 15 05/21/2025     (L) 05/21/2025    K 4.4 05/21/2025     05/21/2025    CREATININE 1.42 (H) 05/21/2025    BUN 12 05/21/2025    CO2 25 05/21/2025    TSH 1.03 07/31/2023    INR 1.0 04/26/2023     Nutrition Specific Medications:  Scheduled Medications[3]  Continuous Medications[4]    Dietary Orders (From admission, onward)       Start     Ordered    05/21/25 0937  Adult diet Full Liquid  Diet effective now        Question:  Diet type  Answer:  Full Liquid    05/21/25 0937    05/20/25 1435  May Participate in Room Service  ( ROOM SERVICE MAY PARTICIPATE)  Once        Question:  .  Answer:  Yes    05/20/25 1434                  Estimated Needs:   Estimated Energy Needs  Total Energy Estimated Needs in 24 hours (kCal): 1305 kCal  Energy Estimated Needs per kg Body Weight in 24 hours (kCal/kg): 35 kCal/kg  Method for Estimating Needs: actual wt    Estimated Protein Needs  Total Protein Estimated Needs in 24 Hours (g): 56 g  Protein Estimated Needs per kg Body Weight in 24 Hours (g/kg): 1.5 g/kg  Method for Estimating 24 Hour Protein Needs: actual wt    Estimated Fluid  Needs  Method for Estimating 24 Hour Fluid Needs: 1 ml/kcal or per MD       Nutrition Diagnosis   Nutrition Diagnosis:  Malnutrition Diagnosis  Patient has Malnutrition Diagnosis: Yes  Diagnosis Status: New  Malnutrition Diagnosis: Severe malnutrition related to acute disease or injury  Related to: decreased ability to consume/tolerate sufficient energy  As Evidenced by: moderate/severe subcutaneous fat loss and muscle wasting, 9# (9.6%) wt loss over ~1.5 months, po intake </= 75% estimated needs for >/= 1 month            Nutrition Interventions/Recommendations   Nutrition Interventions and Recommendations:  Nutrition Prescription: Nutrition prescription for oral nutrition    Nutrition Recommendations:  Individualized Nutrition Prescription Provided for : continue to advance to low fat diet as able. provide ensure high protein BID to supplement po intake    Nutrition Interventions/Goals:   Food and/or Nutrient Delivery Interventions  Interventions: Meals and snacks, Medical food supplement  Meals and Snacks: Fat-modified diet  Goal: advance as able  Medical Food Supplement: Commercial beverage medical food supplement therapy  Goal: chocolate ensure high protein BID to provide 160 kcals and 16g protein each    Education Documentation  No documentation found.              Nutrition Monitoring and Evaluation   Monitoring/Evaluation:   Food/Nutrient Related History Monitoring  Monitoring and Evaluation Plan: Estimated Energy Intake  Estimated Energy Intake: Energy intake greater or equal to 75% of estimated energy needs    Anthropometric Measurements  Monitoring and Evaluation Plan: Body weight  Body Weight: Body weight - Maintain stable weight    Biochemical Data, Medical Tests and Procedures  Monitoring and Evaluation Plan: Electrolyte/renal panel  Electrolyte and Renal Panel: Electrolytes within normal limits    Goal Status: New goal(s) identified    Follow Up  Time Spent (min): 30 minutes  Last Date of Nutrition  Visit: 05/21/25  Nutrition Follow-Up Needed?: 3-5 days  Follow up Comment: 5/23/25          [1]   Past Medical History:  Diagnosis Date    Age-related nuclear cataract of both eyes     Irregular astigmatism, bilateral     Unspecified disorder of refraction    [2]   Past Surgical History:  Procedure Laterality Date    BREAST LUMPECTOMY  05/24/2013    Breast Surgery Lumpectomy    COLONOSCOPY  2017    normal. Due in 2027    CT GUIDED PERCUTANEOUS BIOPSY MEDIASTINUM  06/24/2022    CT GUIDED PERCUTANEOUS BIOPSY MEDIASTINUM 6/24/2022 [facility]    US GUIDED ABSCESS FLUID COLLECTION DRAINAGE  06/06/2022    US GUIDED ABSCESS FLUID COLLECTION DRAINAGE 6/6/2022 [facility]    US GUIDED ABSCESS FLUID COLLECTION DRAINAGE  06/06/2022    US GUIDED ABSCESS FLUID COLLECTION DRAINAGE 6/6/2022 [facility]   [3] buPROPion XL, 150 mg, oral, Daily before breakfast  calcium carbonate-vitamin D3, 2 tablet, oral, BID  colestipol, 1 g, oral, BID  heparin (porcine), 5,000 Units, subcutaneous, q8h JENI  hydrOXYzine HCL, 25 mg, oral, 4x daily  lipase-protease-amylase, 1 capsule, oral, TID  lisinopril, 20 mg, oral, Daily  magnesium oxide, 400 mg of magnesium oxide, oral, Daily  mirtazapine, 7.5 mg, oral, Nightly  pantoprazole, 40 mg, oral, Daily before breakfast   Or  pantoprazole, 40 mg, intravenous, Daily before breakfast  PARoxetine, 40 mg, oral, Daily  potassium chloride CR, 20 mEq, oral, Daily  QUEtiapine, 12.5 mg, oral, Nightly  sucralfate, 1 g, oral, Before meals & nightly  traZODone, 100 mg, oral, Nightly     [4] dextrose 5%-0.45 % sodium chloride, 100 mL/hr, Last Rate: 100 mL/hr (05/20/25 2122)

## 2025-05-21 NOTE — ASSESSMENT & PLAN NOTE
Acute on chronic pancreatitis  Acute renal failure  History of tobacco abuse  Marijuana abuse  Leukocytosis  Hyponatremia  Normocytic anemia  Common bile duct dilatation    Plan: Continue current medication.  Supportive care.  Physical therapy and Occupational Therapy to control pain.  Keep NPO.  Give IV fluid.  Monitor renal function panel.  The patient has been started on clear liquid diet.  Advance diet as tolerated.  The patient has been advised to quit smoking and marijuana.  Monitor white cell count.  Patient has mild hyponatremia monitor for now.  Will take [place], fall, aspiration, decubitus, and DVT precautions.  This has been discussed with the patient and she is agreeable to it.    Date of service: 5/21/2025    Continue current medication patient pain is slightly better.  Patient still having pain with the eating.  Advance diet as tolerated.  Continue to control pain.  Use Poligrip.  Monitor renal function.  Patient has drop in hemoglobin hematocrit.  That is most likely due to hemodilution.  Leukocytosis resolved.  Continue monitor renal function panel.  We will take DVT, fall, aspiration, decubitus, and DVT precautions.

## 2025-05-22 LAB
ANION GAP SERPL CALCULATED.3IONS-SCNC: 11 MMOL/L (ref 10–20)
BASOPHILS # BLD AUTO: 0.04 X10*3/UL (ref 0–0.1)
BASOPHILS NFR BLD AUTO: 0.5 %
BUN SERPL-MCNC: 11 MG/DL (ref 6–23)
CALCIUM SERPL-MCNC: 8.8 MG/DL (ref 8.6–10.3)
CHLORIDE SERPL-SCNC: 101 MMOL/L (ref 98–107)
CO2 SERPL-SCNC: 27 MMOL/L (ref 21–32)
CREAT SERPL-MCNC: 1.52 MG/DL (ref 0.5–1.05)
EGFRCR SERPLBLD CKD-EPI 2021: 37 ML/MIN/1.73M*2
EOSINOPHIL # BLD AUTO: 0.15 X10*3/UL (ref 0–0.7)
EOSINOPHIL NFR BLD AUTO: 1.8 %
ERYTHROCYTE [DISTWIDTH] IN BLOOD BY AUTOMATED COUNT: 11.4 % (ref 11.5–14.5)
GLUCOSE SERPL-MCNC: 84 MG/DL (ref 74–99)
HCT VFR BLD AUTO: 28.5 % (ref 36–46)
HGB BLD-MCNC: 9.4 G/DL (ref 12–16)
IMM GRANULOCYTES # BLD AUTO: 0.03 X10*3/UL (ref 0–0.7)
IMM GRANULOCYTES NFR BLD AUTO: 0.4 % (ref 0–0.9)
LYMPHOCYTES # BLD AUTO: 1.31 X10*3/UL (ref 1.2–4.8)
LYMPHOCYTES NFR BLD AUTO: 15.8 %
MCH RBC QN AUTO: 32 PG (ref 26–34)
MCHC RBC AUTO-ENTMCNC: 33 G/DL (ref 32–36)
MCV RBC AUTO: 97 FL (ref 80–100)
MONOCYTES # BLD AUTO: 0.58 X10*3/UL (ref 0.1–1)
MONOCYTES NFR BLD AUTO: 7 %
NEUTROPHILS # BLD AUTO: 6.17 X10*3/UL (ref 1.2–7.7)
NEUTROPHILS NFR BLD AUTO: 74.5 %
NRBC BLD-RTO: 0 /100 WBCS (ref 0–0)
PLATELET # BLD AUTO: 179 X10*3/UL (ref 150–450)
POTASSIUM SERPL-SCNC: 4.9 MMOL/L (ref 3.5–5.3)
RBC # BLD AUTO: 2.94 X10*6/UL (ref 4–5.2)
SODIUM SERPL-SCNC: 134 MMOL/L (ref 136–145)
WBC # BLD AUTO: 8.3 X10*3/UL (ref 4.4–11.3)

## 2025-05-22 PROCEDURE — 2500000004 HC RX 250 GENERAL PHARMACY W/ HCPCS (ALT 636 FOR OP/ED): Performed by: INTERNAL MEDICINE

## 2025-05-22 PROCEDURE — 80048 BASIC METABOLIC PNL TOTAL CA: CPT | Performed by: INTERNAL MEDICINE

## 2025-05-22 PROCEDURE — 2500000001 HC RX 250 WO HCPCS SELF ADMINISTERED DRUGS (ALT 637 FOR MEDICARE OP): Performed by: INTERNAL MEDICINE

## 2025-05-22 PROCEDURE — 85025 COMPLETE CBC W/AUTO DIFF WBC: CPT | Performed by: INTERNAL MEDICINE

## 2025-05-22 PROCEDURE — 36415 COLL VENOUS BLD VENIPUNCTURE: CPT | Performed by: INTERNAL MEDICINE

## 2025-05-22 PROCEDURE — 2500000002 HC RX 250 W HCPCS SELF ADMINISTERED DRUGS (ALT 637 FOR MEDICARE OP, ALT 636 FOR OP/ED): Performed by: INTERNAL MEDICINE

## 2025-05-22 PROCEDURE — 1200000002 HC GENERAL ROOM WITH TELEMETRY DAILY

## 2025-05-22 RX ADMIN — LISINOPRIL 20 MG: 20 TABLET ORAL at 09:06

## 2025-05-22 RX ADMIN — PANCRELIPASE 1 CAPSULE: 120000; 24000; 76000 CAPSULE, DELAYED RELEASE PELLETS ORAL at 09:07

## 2025-05-22 RX ADMIN — HEPARIN SODIUM 5000 UNITS: 5000 INJECTION, SOLUTION INTRAVENOUS; SUBCUTANEOUS at 22:35

## 2025-05-22 RX ADMIN — Medication 1 TABLET: at 09:06

## 2025-05-22 RX ADMIN — COLESTIPOL HYDROCHLORIDE 1 G: 1 TABLET, FILM COATED ORAL at 21:18

## 2025-05-22 RX ADMIN — MORPHINE SULFATE 2 MG: 2 INJECTION, SOLUTION INTRAMUSCULAR; INTRAVENOUS at 09:31

## 2025-05-22 RX ADMIN — SUCRALFATE 1 G: 1 TABLET ORAL at 21:18

## 2025-05-22 RX ADMIN — SUCRALFATE 1 G: 1 TABLET ORAL at 12:28

## 2025-05-22 RX ADMIN — HYDROXYZINE HYDROCHLORIDE 25 MG: 25 TABLET, FILM COATED ORAL at 17:35

## 2025-05-22 RX ADMIN — BUPROPION HYDROCHLORIDE 150 MG: 150 TABLET, EXTENDED RELEASE ORAL at 06:27

## 2025-05-22 RX ADMIN — SUCRALFATE 1 G: 1 TABLET ORAL at 17:35

## 2025-05-22 RX ADMIN — HYDROXYZINE HYDROCHLORIDE 25 MG: 25 TABLET, FILM COATED ORAL at 21:18

## 2025-05-22 RX ADMIN — HEPARIN SODIUM 5000 UNITS: 5000 INJECTION, SOLUTION INTRAVENOUS; SUBCUTANEOUS at 06:27

## 2025-05-22 RX ADMIN — MORPHINE SULFATE 2 MG: 2 INJECTION, SOLUTION INTRAMUSCULAR; INTRAVENOUS at 22:37

## 2025-05-22 RX ADMIN — HYDROXYZINE HYDROCHLORIDE 25 MG: 25 TABLET, FILM COATED ORAL at 12:28

## 2025-05-22 RX ADMIN — MORPHINE SULFATE 2 MG: 2 INJECTION, SOLUTION INTRAMUSCULAR; INTRAVENOUS at 18:31

## 2025-05-22 RX ADMIN — PANCRELIPASE 1 CAPSULE: 120000; 24000; 76000 CAPSULE, DELAYED RELEASE PELLETS ORAL at 12:28

## 2025-05-22 RX ADMIN — SUCRALFATE 1 G: 1 TABLET ORAL at 06:27

## 2025-05-22 RX ADMIN — PANTOPRAZOLE SODIUM 40 MG: 40 TABLET, DELAYED RELEASE ORAL at 06:27

## 2025-05-22 RX ADMIN — MIRTAZAPINE 7.5 MG: 7.5 TABLET, FILM COATED ORAL at 21:18

## 2025-05-22 RX ADMIN — QUETIAPINE FUMARATE 12.5 MG: 25 TABLET ORAL at 21:18

## 2025-05-22 RX ADMIN — TRAZODONE HYDROCHLORIDE 100 MG: 100 TABLET ORAL at 21:18

## 2025-05-22 RX ADMIN — PANCRELIPASE 1 CAPSULE: 120000; 24000; 76000 CAPSULE, DELAYED RELEASE PELLETS ORAL at 17:35

## 2025-05-22 RX ADMIN — Medication 2 TABLET: at 21:18

## 2025-05-22 RX ADMIN — Medication 2 TABLET: at 09:05

## 2025-05-22 RX ADMIN — PAROXETINE HYDROCHLORIDE 40 MG: 20 TABLET, FILM COATED ORAL at 09:05

## 2025-05-22 RX ADMIN — COLESTIPOL HYDROCHLORIDE 1 G: 1 TABLET, FILM COATED ORAL at 09:07

## 2025-05-22 RX ADMIN — HYDROXYZINE HYDROCHLORIDE 25 MG: 25 TABLET, FILM COATED ORAL at 06:27

## 2025-05-22 ASSESSMENT — COGNITIVE AND FUNCTIONAL STATUS - GENERAL
DAILY ACTIVITIY SCORE: 24
MOBILITY SCORE: 24

## 2025-05-22 ASSESSMENT — PAIN DESCRIPTION - LOCATION: LOCATION: ABDOMEN

## 2025-05-22 ASSESSMENT — PAIN - FUNCTIONAL ASSESSMENT
PAIN_FUNCTIONAL_ASSESSMENT: 0-10

## 2025-05-22 ASSESSMENT — PAIN SCALES - GENERAL
PAINLEVEL_OUTOF10: 7

## 2025-05-22 NOTE — PROGRESS NOTES
05/22/25 1214   Discharge Planning   Living Arrangements Alone   Support Systems Friends/neighbors;Family members   Type of Residence Private residence   Home or Post Acute Services None   Expected Discharge Disposition Home   Does the patient need discharge transport arranged? No     Patient will have no needs upon discharge

## 2025-05-22 NOTE — PROGRESS NOTES
Jodi Johnson is a 69 y.o. female on day 2 of admission presenting with Acute abdominal pain.    Subjective   Patient denies any abdominal pain, nausea, vomiting.  She is currently tolerating diet       Objective     Physical Exam  HENT:      Head: Normocephalic.      Right Ear: Tympanic membrane normal.      Mouth/Throat:      Mouth: Mucous membranes are moist.   Eyes:      Pupils: Pupils are equal, round, and reactive to light.   Cardiovascular:      Rate and Rhythm: Normal rate.      Pulses: Normal pulses.   Abdominal:      Palpations: Abdomen is soft.   Musculoskeletal:         General: Normal range of motion.      Cervical back: Normal range of motion.   Skin:     General: Skin is warm.   Neurological:      Mental Status: She is alert.   Psychiatric:         Mood and Affect: Mood normal.         Last Recorded Vitals  Blood pressure 167/71, pulse 61, temperature 37 °C (98.6 °F), temperature source Temporal, resp. rate 16, height 1.524 m (5'), weight (!) 37.4 kg (82 lb 8 oz), SpO2 96%.  Intake/Output last 3 Shifts:  I/O last 3 completed shifts:  In: 683.3 (18.3 mL/kg) [P.O.:240; I.V.:443.3 (11.8 mL/kg)]  Out: - (0 mL/kg)   Weight: 37.4 kg     Relevant Results      Scheduled medications  Scheduled Medications[1]  Continuous medications  Continuous Medications[2]  PRN medications  PRN Medications[3]  Results for orders placed or performed during the hospital encounter of 05/20/25 (from the past 24 hours)   CBC and Auto Differential   Result Value Ref Range    WBC 8.3 4.4 - 11.3 x10*3/uL    nRBC 0.0 0.0 - 0.0 /100 WBCs    RBC 2.94 (L) 4.00 - 5.20 x10*6/uL    Hemoglobin 9.4 (L) 12.0 - 16.0 g/dL    Hematocrit 28.5 (L) 36.0 - 46.0 %    MCV 97 80 - 100 fL    MCH 32.0 26.0 - 34.0 pg    MCHC 33.0 32.0 - 36.0 g/dL    RDW 11.4 (L) 11.5 - 14.5 %    Platelets 179 150 - 450 x10*3/uL    Neutrophils % 74.5 40.0 - 80.0 %    Immature Granulocytes %, Automated 0.4 0.0 - 0.9 %    Lymphocytes % 15.8 13.0 - 44.0 %    Monocytes % 7.0  2.0 - 10.0 %    Eosinophils % 1.8 0.0 - 6.0 %    Basophils % 0.5 0.0 - 2.0 %    Neutrophils Absolute 6.17 1.20 - 7.70 x10*3/uL    Immature Granulocytes Absolute, Automated 0.03 0.00 - 0.70 x10*3/uL    Lymphocytes Absolute 1.31 1.20 - 4.80 x10*3/uL    Monocytes Absolute 0.58 0.10 - 1.00 x10*3/uL    Eosinophils Absolute 0.15 0.00 - 0.70 x10*3/uL    Basophils Absolute 0.04 0.00 - 0.10 x10*3/uL   Basic Metabolic Panel   Result Value Ref Range    Glucose 84 74 - 99 mg/dL    Sodium 134 (L) 136 - 145 mmol/L    Potassium 4.9 3.5 - 5.3 mmol/L    Chloride 101 98 - 107 mmol/L    Bicarbonate 27 21 - 32 mmol/L    Anion Gap 11 10 - 20 mmol/L    Urea Nitrogen 11 6 - 23 mg/dL    Creatinine 1.52 (H) 0.50 - 1.05 mg/dL    eGFR 37 (L) >60 mL/min/1.73m*2    Calcium 8.8 8.6 - 10.3 mg/dL                       Assessment & Plan  Acute abdominal pain    Acute on Chronic Pancreatitis, Abnormal CT   Appears acute on chronic pancreatitis given new inflammation at pancreatic head. Lipase noted, normal. 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      Currently, her LFTs are normal. Pain improved. I spoke with Dr Mcrae. Continue supportive care. Plan for outpatient repeat advanced endoscopy, consider pancreatic duct stenting      She is sober from alcohol. Still smoking cigarettes. Advised cessation     5/22  Patient reports she is overall feeling better today, no reported fevers overnight.  She is tolerating diet. Plan for outpatient repeat advanced endoscopy, consider pancreatic duct stenting per Dr. Jarred Edwards, APRN-CNP           [1] buPROPion XL, 150 mg, oral, Daily before breakfast  calcium carbonate-vitamin D3, 2 tablet, oral, BID  colestipol, 1 g, oral, BID  heparin (porcine), 5,000 Units, subcutaneous, q8h JENI  hydrOXYzine HCL, 25 mg, oral, 4x daily  lipase-protease-amylase, 1 capsule, oral, TID  lisinopril, 20 mg, oral, Daily  magnesium oxide, 400  mg of magnesium oxide, oral, Daily  mirtazapine, 7.5 mg, oral, Nightly  pantoprazole, 40 mg, oral, Daily before breakfast   Or  pantoprazole, 40 mg, intravenous, Daily before breakfast  PARoxetine, 40 mg, oral, Daily  potassium chloride CR, 20 mEq, oral, Daily  QUEtiapine, 12.5 mg, oral, Nightly  sucralfate, 1 g, oral, Before meals & nightly  traZODone, 100 mg, oral, Nightly    [2]    [3] PRN medications: acetaminophen **OR** acetaminophen **OR** acetaminophen, benzocaine-menthol, cyclobenzaprine, dextromethorphan-guaifenesin, diclofenac sodium, guaiFENesin, melatonin, morphine, polyethylene glycol

## 2025-05-22 NOTE — CARE PLAN
The patient's goals for the shift include      The clinical goals for the shift include monitor pain; advance diet as tolerated

## 2025-05-23 ENCOUNTER — PHARMACY VISIT (OUTPATIENT)
Dept: PHARMACY | Facility: CLINIC | Age: 70
End: 2025-05-23
Payer: COMMERCIAL

## 2025-05-23 VITALS
HEART RATE: 68 BPM | TEMPERATURE: 97.9 F | SYSTOLIC BLOOD PRESSURE: 146 MMHG | BODY MASS INDEX: 16.2 KG/M2 | HEIGHT: 60 IN | WEIGHT: 82.5 LBS | RESPIRATION RATE: 16 BRPM | OXYGEN SATURATION: 95 % | DIASTOLIC BLOOD PRESSURE: 72 MMHG

## 2025-05-23 LAB
ANION GAP SERPL CALCULATED.3IONS-SCNC: 11 MMOL/L (ref 10–20)
BASOPHILS # BLD AUTO: 0.05 X10*3/UL (ref 0–0.1)
BASOPHILS NFR BLD AUTO: 0.6 %
BUN SERPL-MCNC: 16 MG/DL (ref 6–23)
CALCIUM SERPL-MCNC: 9 MG/DL (ref 8.6–10.3)
CHLORIDE SERPL-SCNC: 99 MMOL/L (ref 98–107)
CO2 SERPL-SCNC: 27 MMOL/L (ref 21–32)
CREAT SERPL-MCNC: 1.75 MG/DL (ref 0.5–1.05)
EGFRCR SERPLBLD CKD-EPI 2021: 32 ML/MIN/1.73M*2
EOSINOPHIL # BLD AUTO: 0.14 X10*3/UL (ref 0–0.7)
EOSINOPHIL NFR BLD AUTO: 1.8 %
ERYTHROCYTE [DISTWIDTH] IN BLOOD BY AUTOMATED COUNT: 11.6 % (ref 11.5–14.5)
GLUCOSE SERPL-MCNC: 126 MG/DL (ref 74–99)
HCT VFR BLD AUTO: 30.6 % (ref 36–46)
HGB BLD-MCNC: 10.1 G/DL (ref 12–16)
IMM GRANULOCYTES # BLD AUTO: 0.02 X10*3/UL (ref 0–0.7)
IMM GRANULOCYTES NFR BLD AUTO: 0.3 % (ref 0–0.9)
LYMPHOCYTES # BLD AUTO: 1.12 X10*3/UL (ref 1.2–4.8)
LYMPHOCYTES NFR BLD AUTO: 14.3 %
MCH RBC QN AUTO: 32.2 PG (ref 26–34)
MCHC RBC AUTO-ENTMCNC: 33 G/DL (ref 32–36)
MCV RBC AUTO: 98 FL (ref 80–100)
MONOCYTES # BLD AUTO: 0.44 X10*3/UL (ref 0.1–1)
MONOCYTES NFR BLD AUTO: 5.6 %
NEUTROPHILS # BLD AUTO: 6.04 X10*3/UL (ref 1.2–7.7)
NEUTROPHILS NFR BLD AUTO: 77.4 %
NRBC BLD-RTO: 0 /100 WBCS (ref 0–0)
PLATELET # BLD AUTO: 189 X10*3/UL (ref 150–450)
POTASSIUM SERPL-SCNC: 4.9 MMOL/L (ref 3.5–5.3)
RBC # BLD AUTO: 3.14 X10*6/UL (ref 4–5.2)
SODIUM SERPL-SCNC: 132 MMOL/L (ref 136–145)
WBC # BLD AUTO: 7.8 X10*3/UL (ref 4.4–11.3)

## 2025-05-23 PROCEDURE — 80048 BASIC METABOLIC PNL TOTAL CA: CPT | Performed by: INTERNAL MEDICINE

## 2025-05-23 PROCEDURE — 2500000004 HC RX 250 GENERAL PHARMACY W/ HCPCS (ALT 636 FOR OP/ED): Performed by: INTERNAL MEDICINE

## 2025-05-23 PROCEDURE — 85025 COMPLETE CBC W/AUTO DIFF WBC: CPT | Performed by: INTERNAL MEDICINE

## 2025-05-23 PROCEDURE — 2500000002 HC RX 250 W HCPCS SELF ADMINISTERED DRUGS (ALT 637 FOR MEDICARE OP, ALT 636 FOR OP/ED): Performed by: INTERNAL MEDICINE

## 2025-05-23 PROCEDURE — 36415 COLL VENOUS BLD VENIPUNCTURE: CPT | Performed by: INTERNAL MEDICINE

## 2025-05-23 PROCEDURE — RXMED WILLOW AMBULATORY MEDICATION CHARGE

## 2025-05-23 PROCEDURE — 2500000001 HC RX 250 WO HCPCS SELF ADMINISTERED DRUGS (ALT 637 FOR MEDICARE OP): Performed by: INTERNAL MEDICINE

## 2025-05-23 RX ORDER — TRAMADOL HYDROCHLORIDE 50 MG/1
50 TABLET, FILM COATED ORAL EVERY 12 HOURS PRN
Qty: 10 TABLET | Refills: 0 | Status: SHIPPED | OUTPATIENT
Start: 2025-05-23

## 2025-05-23 RX ORDER — HYDROXYZINE HYDROCHLORIDE 25 MG/1
25 TABLET, FILM COATED ORAL EVERY 8 HOURS PRN
Start: 2025-05-23

## 2025-05-23 RX ADMIN — Medication 2 TABLET: at 09:17

## 2025-05-23 RX ADMIN — PANCRELIPASE 1 CAPSULE: 120000; 24000; 76000 CAPSULE, DELAYED RELEASE PELLETS ORAL at 09:16

## 2025-05-23 RX ADMIN — SUCRALFATE 1 G: 1 TABLET ORAL at 12:22

## 2025-05-23 RX ADMIN — LISINOPRIL 20 MG: 20 TABLET ORAL at 09:17

## 2025-05-23 RX ADMIN — PAROXETINE HYDROCHLORIDE 40 MG: 20 TABLET, FILM COATED ORAL at 09:16

## 2025-05-23 RX ADMIN — HYDROXYZINE HYDROCHLORIDE 25 MG: 25 TABLET, FILM COATED ORAL at 12:22

## 2025-05-23 RX ADMIN — HYDROXYZINE HYDROCHLORIDE 25 MG: 25 TABLET, FILM COATED ORAL at 06:00

## 2025-05-23 RX ADMIN — BUPROPION HYDROCHLORIDE 150 MG: 150 TABLET, EXTENDED RELEASE ORAL at 06:00

## 2025-05-23 RX ADMIN — MORPHINE SULFATE 2 MG: 2 INJECTION, SOLUTION INTRAMUSCULAR; INTRAVENOUS at 02:29

## 2025-05-23 RX ADMIN — Medication 1 TABLET: at 09:17

## 2025-05-23 RX ADMIN — HEPARIN SODIUM 5000 UNITS: 5000 INJECTION, SOLUTION INTRAVENOUS; SUBCUTANEOUS at 06:01

## 2025-05-23 RX ADMIN — PANTOPRAZOLE SODIUM 40 MG: 40 TABLET, DELAYED RELEASE ORAL at 06:00

## 2025-05-23 RX ADMIN — SUCRALFATE 1 G: 1 TABLET ORAL at 06:01

## 2025-05-23 RX ADMIN — COLESTIPOL HYDROCHLORIDE 1 G: 1 TABLET, FILM COATED ORAL at 09:16

## 2025-05-23 RX ADMIN — PANCRELIPASE 1 CAPSULE: 120000; 24000; 76000 CAPSULE, DELAYED RELEASE PELLETS ORAL at 12:22

## 2025-05-23 ASSESSMENT — COGNITIVE AND FUNCTIONAL STATUS - GENERAL
DAILY ACTIVITIY SCORE: 24
MOBILITY SCORE: 24

## 2025-05-23 ASSESSMENT — PAIN SCALES - GENERAL: PAINLEVEL_OUTOF10: 7

## 2025-05-23 NOTE — ASSESSMENT & PLAN NOTE
Acute on chronic pancreatitis  Acute renal failure  History of tobacco abuse  Marijuana abuse  Leukocytosis  Hyponatremia  Normocytic anemia  Common bile duct dilatation    Plan: Continue current medication.  Supportive care.  Physical therapy and Occupational Therapy to control pain.  Keep NPO.  Give IV fluid.  Monitor renal function panel.  The patient has been started on clear liquid diet.  Advance diet as tolerated.  The patient has been advised to quit smoking and marijuana.  Monitor white cell count.  Patient has mild hyponatremia monitor for now.  Will take Dupee, fall, aspiration, decubitus, and DVT precautions.  This has been discussed with the patient and she is agreeable to it.    Date of service: 5/21/2025    Continue current medication patient pain is slightly better.  Patient still having pain with the eating.  Advance diet as tolerated.  Continue to control pain.  Use Poligrip.  Monitor renal function.  Patient has drop in hemoglobin hematocrit.  That is most likely due to hemodilution.  Leukocytosis resolved.  Continue monitor renal function panel.  We will take DVT, fall, aspiration, decubitus, and DVT precautions.    Date of service: 5/22/2025        Continue current medication.  Supportive care.  Physical therapy and Occupational Therapy.  Monitor CBC and BMP.  Increase activity.  Patient continued to complain of pain after eating.  Patient did not feel comfortable going home with this pain.  Discharge has been put on hold till tomorrow.  Sodium has improved.  Renal function slightly worse than yesterday.  Hemoglobin hematocrit are stable.  Control pain.  We will take Dupee, fall, aspiration, decubitus, and DVT precautions.  The patient has been advised to quit smoking.

## 2025-05-23 NOTE — PROGRESS NOTES
Jodi Johnson is a 69 y.o. female on day 3 of admission presenting with Acute abdominal pain.    Subjective   The patient was seen and examined.  Patient continued to complain of pain in the abdomen.  No nausea or vomiting.  No diarrhea.       Objective     Physical Exam  HEENT:  Head externally atraumatic,  extraocular movements intact, oral mucosa moist  Neck:  Supple, no JVP, no palpable adenopathy or thyromegaly.  No carotid bruit.  Chest:  Clear to auscultation and resonant.  Heart:  Regular rate and rhythm, no murmur or gallop could be appreciated.  Abdomen:  Soft, epigastric and right upper quadrant tenderness present, bowel sounds present, normoactive, no palpable hepatosplenomegaly.  Extremities:  No edema, pulses present, no cyanosis or clubbing.  CNS:  Patient alert, oriented to time, place and person.    No new deficit.  Cranial nerves 2-12 grossly intact  Skin:  No active rash.  Musculoskeletal:  No  apparent joint swelling or erythema, range of movement normal.  Last Recorded Vitals  Heart Rate:  [56-68]   Temp:  [36.6 °C (97.9 °F)-37 °C (98.6 °F)]   Resp:  [16-20]   BP: (142-151)/(55-85)   SpO2:  [95 %-98 %]     Intake/Output last 3 Shifts:  No intake/output data recorded.    Relevant Results  No results found for the last 90 days.    No results found for this or any previous visit (from the past 24 hours).   Current Medications[1]   Assessment/Plan   Assessment & Plan  Acute abdominal pain  Acute on chronic pancreatitis  Acute renal failure  History of tobacco abuse  Marijuana abuse  Leukocytosis  Hyponatremia  Normocytic anemia  Common bile duct dilatation    Plan: Continue current medication.  Supportive care.  Physical therapy and Occupational Therapy to control pain.  Keep NPO.  Give IV fluid.  Monitor renal function panel.  The patient has been started on clear liquid diet.  Advance diet as tolerated.  The patient has been advised to quit smoking and marijuana.  Monitor white cell count.   Patient has mild hyponatremia monitor for now.  Will take Dupee, fall, aspiration, decubitus, and DVT precautions.  This has been discussed with the patient and she is agreeable to it.    Date of service: 5/21/2025    Continue current medication patient pain is slightly better.  Patient still having pain with the eating.  Advance diet as tolerated.  Continue to control pain.  Use Poligrip.  Monitor renal function.  Patient has drop in hemoglobin hematocrit.  That is most likely due to hemodilution.  Leukocytosis resolved.  Continue monitor renal function panel.  We will take DVT, fall, aspiration, decubitus, and DVT precautions.    Date of service: 5/22/2025        Continue current medication.  Supportive care.  Physical therapy and Occupational Therapy.  Monitor CBC and BMP.  Increase activity.  Patient continued to complain of pain after eating.  Patient did not feel comfortable going home with this pain.  Discharge has been put on hold till tomorrow.  Sodium has improved.  Renal function slightly worse than yesterday.  Hemoglobin hematocrit are stable.  Control pain.  We will take Dupee, fall, aspiration, decubitus, and DVT precautions.  The patient has been advised to quit smoking.           Cesar Rader MD          [1]   Current Facility-Administered Medications:     acetaminophen (Tylenol) tablet 650 mg, 650 mg, oral, q4h PRN **OR** acetaminophen (Tylenol) oral liquid 650 mg, 650 mg, oral, q4h PRN **OR** acetaminophen (Tylenol) suppository 650 mg, 650 mg, rectal, q4h PRN, Cesar Rader MD    benzocaine-menthol (Cepastat Sore Throat) lozenge 1 lozenge, 1 lozenge, Mouth/Throat, q2h PRN, Cesar Rader MD    buPROPion XL (Wellbutrin XL) 24 hr tablet 150 mg, 150 mg, oral, Daily before breakfast, Cesar Rader MD, 150 mg at 05/23/25 0600    calcium carbonate-vitamin D3 500 mg-5 mcg (200 unit) tablet 2 tablet, 2 tablet, oral, BID, Cesar Rader MD, 2 tablet at 05/22/25 2118     colestipol (Colestid) tablet 1 g, 1 g, oral, BID, Cesar Rader MD, 1 g at 05/22/25 2118    cyclobenzaprine (Flexeril) tablet 10 mg, 10 mg, oral, BID PRN, Cesar Rader MD    dextromethorphan-guaifenesin (Robitussin DM)  mg/5 mL oral liquid 5 mL, 5 mL, oral, q4h PRN, Cesar Rader MD    diclofenac sodium (Voltaren) 1 % gel 4 g, 4 g, Topical, 4x daily PRN, Cesar Rader MD    guaiFENesin (Mucinex) 12 hr tablet 600 mg, 600 mg, oral, q12h PRN, Cesar Rader MD    heparin (porcine) injection 5,000 Units, 5,000 Units, subcutaneous, q8h JENI, Cesar Rader MD, 5,000 Units at 05/23/25 0601    hydrOXYzine HCL (Atarax) tablet 25 mg, 25 mg, oral, 4x daily, Cesar Rader MD, 25 mg at 05/23/25 0600    lipase-protease-amylase (Creon) 24,000-76,000 -120,000 unit per capsule 1 capsule, 1 capsule, oral, TID, Cesar Rader MD, 1 capsule at 05/22/25 1735    lisinopril tablet 20 mg, 20 mg, oral, Daily, Cesar Rader MD, 20 mg at 05/22/25 0906    magnesium oxide (Mag-Ox) 400 mg (241.3 mg elemental) tablet 1 tablet, 400 mg of magnesium oxide, oral, Daily, Cesar Rader MD, 1 tablet at 05/22/25 0906    melatonin tablet 6 mg, 6 mg, oral, Nightly PRN, Cesar Rader MD    mirtazapine (Remeron) tablet 7.5 mg, 7.5 mg, oral, Nightly, Cesar Rader MD, 7.5 mg at 05/22/25 2118    morphine injection 2 mg, 2 mg, intravenous, q4h PRN, Cesar Rader MD, 2 mg at 05/23/25 0229    pantoprazole (ProtoNix) EC tablet 40 mg, 40 mg, oral, Daily before breakfast, 40 mg at 05/23/25 0600 **OR** pantoprazole (Protonix) injection 40 mg, 40 mg, intravenous, Daily before breakfast, Cesar Rader MD    PARoxetine (Paxil) tablet 40 mg, 40 mg, oral, Daily, Cesar Rader MD, 40 mg at 05/22/25 0905    polyethylene glycol (Glycolax, Miralax) packet 17 g, 17 g, oral, Daily PRN, Cesar Rader MD    potassium chloride CR (Klor-Con M20) ER tablet 20 mEq, 20 mEq, oral, Daily,  Cesar Rader MD, 20 mEq at 05/21/25 0841    QUEtiapine (SEROquel) tablet 12.5 mg, 12.5 mg, oral, Nightly, Cesar Rader MD, 12.5 mg at 05/22/25 2118    sucralfate (Carafate) tablet 1 g, 1 g, oral, Before meals & nightly, Cesar Rader MD, 1 g at 05/23/25 0601    traZODone (Desyrel) tablet 100 mg, 100 mg, oral, Nightly, Cesar Rader MD, 100 mg at 05/22/25 2118

## 2025-05-23 NOTE — PROGRESS NOTES
05/23/25 0923   Discharge Planning   Living Arrangements Alone   Support Systems Friends/neighbors;Family members   Type of Residence Private residence   Home or Post Acute Services None   Expected Discharge Disposition Home   Does the patient need discharge transport arranged? No     Per MD Notes:  ---patient continued to complain of pain after eating.   ---patient did not feel comfortable going home with this pain.   ---Discharge has been put on hold until 5/23   ---Sodium has improved.     Per GI Notes: Plan for outpatient repeat advanced endoscopy, consider pancreatic duct stenting per Dr. Stern     PLAN: Once stable discharge to home with no needs

## 2025-05-23 NOTE — CARE PLAN
The patient's goals for the shift include      The clinical goals for the shift include pain management

## 2025-05-23 NOTE — PROGRESS NOTES
Jodi Johnson is a 69 y.o. female on day 3 of admission presenting with Acute abdominal pain.    Subjective   Tolerating diet. Pain resolved        Objective     Physical Exam  HENT:      Head: Normocephalic.      Right Ear: Tympanic membrane normal.      Mouth/Throat:      Mouth: Mucous membranes are moist.   Eyes:      Pupils: Pupils are equal, round, and reactive to light.   Cardiovascular:      Rate and Rhythm: Normal rate.      Pulses: Normal pulses.   Abdominal:      Palpations: Abdomen is soft.   Musculoskeletal:         General: Normal range of motion.      Cervical back: Normal range of motion.   Skin:     General: Skin is warm.   Neurological:      Mental Status: She is alert.   Psychiatric:         Mood and Affect: Mood normal.         Last Recorded Vitals  Blood pressure 146/72, pulse 68, temperature 36.6 °C (97.9 °F), temperature source Temporal, resp. rate 16, height 1.524 m (5'), weight (!) 37.4 kg (82 lb 8 oz), SpO2 95%.  Intake/Output last 3 Shifts:  No intake/output data recorded.    Relevant Results      Scheduled medications  Scheduled Medications[1]  Continuous medications  Continuous Medications[2]  PRN medications  PRN Medications[3]  Results for orders placed or performed during the hospital encounter of 05/20/25 (from the past 24 hours)   CBC and Auto Differential   Result Value Ref Range    WBC 7.8 4.4 - 11.3 x10*3/uL    nRBC 0.0 0.0 - 0.0 /100 WBCs    RBC 3.14 (L) 4.00 - 5.20 x10*6/uL    Hemoglobin 10.1 (L) 12.0 - 16.0 g/dL    Hematocrit 30.6 (L) 36.0 - 46.0 %    MCV 98 80 - 100 fL    MCH 32.2 26.0 - 34.0 pg    MCHC 33.0 32.0 - 36.0 g/dL    RDW 11.6 11.5 - 14.5 %    Platelets 189 150 - 450 x10*3/uL    Neutrophils % 77.4 40.0 - 80.0 %    Immature Granulocytes %, Automated 0.3 0.0 - 0.9 %    Lymphocytes % 14.3 13.0 - 44.0 %    Monocytes % 5.6 2.0 - 10.0 %    Eosinophils % 1.8 0.0 - 6.0 %    Basophils % 0.6 0.0 - 2.0 %    Neutrophils Absolute 6.04 1.20 - 7.70 x10*3/uL    Immature  Granulocytes Absolute, Automated 0.02 0.00 - 0.70 x10*3/uL    Lymphocytes Absolute 1.12 (L) 1.20 - 4.80 x10*3/uL    Monocytes Absolute 0.44 0.10 - 1.00 x10*3/uL    Eosinophils Absolute 0.14 0.00 - 0.70 x10*3/uL    Basophils Absolute 0.05 0.00 - 0.10 x10*3/uL   Basic metabolic panel   Result Value Ref Range    Glucose 126 (H) 74 - 99 mg/dL    Sodium 132 (L) 136 - 145 mmol/L    Potassium 4.9 3.5 - 5.3 mmol/L    Chloride 99 98 - 107 mmol/L    Bicarbonate 27 21 - 32 mmol/L    Anion Gap 11 10 - 20 mmol/L    Urea Nitrogen 16 6 - 23 mg/dL    Creatinine 1.75 (H) 0.50 - 1.05 mg/dL    eGFR 32 (L) >60 mL/min/1.73m*2    Calcium 9.0 8.6 - 10.3 mg/dL                       Assessment & Plan  Acute abdominal pain    Acute on Chronic Pancreatitis, Abnormal CT   Appears acute on chronic pancreatitis given new inflammation at pancreatic head. Lipase noted, normal. 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      Currently, her LFTs are normal. Pain improved. I spoke with Dr Mcrae. Continue supportive care. Plan for outpatient repeat advanced endoscopy, consider pancreatic duct stenting      She is sober from alcohol. Still smoking cigarettes. Advised cessation     5/22  Patient reports she is overall feeling better today, no reported fevers overnight.  She is tolerating diet. Plan for outpatient repeat advanced endoscopy, consider pancreatic duct stenting per Dr. Stern    5/23  No barriers to DC from GI standpoint. Dr Mcrae follow up information added to chart     Shelley Mccann, APRN-CNP           [1] buPROPion XL, 150 mg, oral, Daily before breakfast  calcium carbonate-vitamin D3, 2 tablet, oral, BID  colestipol, 1 g, oral, BID  heparin (porcine), 5,000 Units, subcutaneous, q8h JENI  hydrOXYzine HCL, 25 mg, oral, 4x daily  lipase-protease-amylase, 1 capsule, oral, TID  lisinopril, 20 mg, oral, Daily  magnesium oxide, 400 mg of magnesium oxide, oral,  Daily  mirtazapine, 7.5 mg, oral, Nightly  pantoprazole, 40 mg, oral, Daily before breakfast   Or  pantoprazole, 40 mg, intravenous, Daily before breakfast  PARoxetine, 40 mg, oral, Daily  potassium chloride CR, 20 mEq, oral, Daily  QUEtiapine, 12.5 mg, oral, Nightly  sucralfate, 1 g, oral, Before meals & nightly  traZODone, 100 mg, oral, Nightly     [2]    [3] PRN medications: acetaminophen **OR** acetaminophen **OR** acetaminophen, benzocaine-menthol, cyclobenzaprine, dextromethorphan-guaifenesin, diclofenac sodium, guaiFENesin, melatonin, morphine, polyethylene glycol

## 2025-05-24 NOTE — DISCHARGE SUMMARY
Discharge Diagnosis  Acute abdominal pain    Malnutrition Diagnosis Status: New  Malnutrition Diagnosis: Severe malnutrition related to acute disease or injury  Related to: decreased ability to consume/tolerate sufficient energy  As Evidenced by: moderate/severe subcutaneous fat loss and muscle wasting, 9# (9.6%) wt loss over ~1.5 months, po intake </= 75% estimated needs for >/= 1 month  I agree with the dietitian's malnutrition diagnosis.        Issues Requiring Follow-Up  Acute on chronic pancreatitis  Acute renal failure  Marijuana abuse  Leukocytosis  Normocytic anemia  Common bile duct dilatation  Discharge Meds     Medication List      START taking these medications     traMADol 50 mg tablet; Commonly known as: Ultram; Take 1 tablet (50 mg)   by mouth every 12 hours if needed for moderate pain (4 - 6).     CHANGE how you take these medications     hydrOXYzine HCL 25 mg tablet; Commonly known as: Atarax; Take 1 tablet   (25 mg) by mouth every 8 hours if needed for itching.; What changed: how   much to take, how to take this, when to take this, reasons to take this   traZODone 100 mg tablet; Commonly known as: Desyrel; What changed:   Another medication with the same name was removed. Continue taking this   medication, and follow the directions you see here.     CONTINUE taking these medications     ADULT ONE DAILY MULTIVITAMIN ORAL   buPROPion  mg 24 hr tablet; Commonly known as: Wellbutrin XL   calcium carbonate-vitamin D3 600 mg-20 mcg (800 unit) tablet   calcium citrate-vitamin D3 315 mg-5 mcg (200 unit) tablet; Commonly   known as: Citracal+D   colestipol 1 gram tablet; Commonly known as: Colestid; Take 1 tablet (1   g) by mouth 2 times a day.   * Creon 12,000-38,000 -60,000 unit capsule; Generic drug: pancrelipase   (Lip-Prot-Amyl)   * Creon 24,000-76,000 -120,000 unit capsule; Generic drug:   lipase-protease-amylase; TAKE 1 CAPSULE BY MOUTH THREE TIMES DAILY WITH   MEALS   cyclobenzaprine 10 mg  tablet; Commonly known as: Flexeril; Take 1 tablet   (10 mg) by mouth 2 times a day as needed for muscle spasms for up to 10   days.   diclofenac sodium 1 % kit   lisinopril 20 mg tablet   magnesium oxide 400 mg (241.3 mg elemental) tablet; Commonly known as:   Mag-Ox; TAKE ONE TABLET BY MOUTH TWICE DAILY   melatonin 3 mg capsule   mirtazapine 15 mg tablet; Commonly known as: Remeron; 1/2 tablet at   bedtime Orally Once a day for 90 day(s)   naproxen 500 mg tablet; Commonly known as: Naprosyn; Take 1 tablet (500   mg) by mouth 2 times a day as needed for mild pain (1 - 3).   ondansetron ODT 4 mg disintegrating tablet; Commonly known as:   Zofran-ODT; Take 1 tablet (4 mg) by mouth every 8 hours if needed for   nausea.   pantoprazole 40 mg EC tablet; Commonly known as: ProtoNix; TAKE ONE   TABLET BY MOUTH ONCE DAILY   PARoxetine 40 mg tablet; Commonly known as: Paxil; Take 1 tablet (40 mg)   by mouth once daily.   potassium chloride CR 20 mEq ER tablet; Commonly known as: Klor-Con M20;   TAKE ONE TABLET BY MOUTH ONCE A DAY   QUEtiapine 25 mg tablet; Commonly known as: SEROquel   sucralfate 1 gram tablet; Commonly known as: Carafate  * This list has 2 medication(s) that are the same as other medications   prescribed for you. Read the directions carefully, and ask your doctor or   other care provider to review them with you.       Test Results Pending At Discharge  Pending Labs       No current pending labs.            Hospital Course   Patient was admitted with a complaint of abdominal pain.  Patient had acute on chronic pancreatitis.  The patient was treated conservatively.  Condition of the patient improved.  The patient still likely liquid diet and was slowly advanced.  The patient was seen by gastroenterology.  The patient was discharged home in a stable condition.    Pertinent Physical Exam At Time of Discharge  Physical Exam  HEENT:  Head externally atraumatic, no pallor, no icterus, extraocular intact, react to  light, oral mucosa moist and throat clear.  Neck:  Supple, no JVP, no palpable adenopathy or thyromegaly.  No carotid bruit.  Chest:  Clear to auscultation and resonant.  Heart:  Regular rate and rhythm, no murmur or gallop could be appreciated.  Abdomen:  Soft, mild epigastric and right upper quadrant tenderness, bowel sounds present, normoactive, no palpable hepatosplenomegaly.  Extremities:  No edema, pulses present, no cyanosis or clubbing.  CNS:  Patient alert, oriented to time, place and person.  Power 5/5 all over and deep tendon reflexes symmetrical, cranial nerves 2-12 grossly intact.  Skin:  No active rash.  Musculoskeletal:  No joint swelling or erythema, range of movement normal.  Outpatient Follow-Up  No future appointments.      Cesar Rader MD

## 2025-05-28 ENCOUNTER — TELEPHONE (OUTPATIENT)
Dept: GASTROENTEROLOGY | Facility: HOSPITAL | Age: 70
End: 2025-05-28
Payer: COMMERCIAL

## 2025-05-28 DIAGNOSIS — K86.89: Primary | ICD-10-CM

## 2025-05-28 DIAGNOSIS — K85.90: Primary | ICD-10-CM

## 2025-05-28 DIAGNOSIS — K86.0 ALCOHOL-INDUCED CHRONIC PANCREATITIS (MULTI): ICD-10-CM

## 2025-06-04 NOTE — TELEPHONE ENCOUNTER
Call returned.  Questions answered.    We both agree that a repeat ERCP to place a pancreatic stent is reasonable due to recent acute on chronic attack and prior ERCP findings.    Order placed.  Miller Mcrae, DO

## 2025-06-09 ENCOUNTER — TELEPHONE (OUTPATIENT)
Dept: GASTROENTEROLOGY | Facility: HOSPITAL | Age: 70
End: 2025-06-09
Payer: COMMERCIAL

## 2025-06-09 NOTE — TELEPHONE ENCOUNTER
DWAYNE/Shane/[phone]  Calling to check in regarding upcoming testing that Jodi has to have done

## 2025-06-10 ENCOUNTER — TELEPHONE (OUTPATIENT)
Dept: GASTROENTEROLOGY | Facility: HOSPITAL | Age: 70
End: 2025-06-10
Payer: COMMERCIAL

## 2025-06-10 NOTE — TELEPHONE ENCOUNTER
Hi Dr. Mcrae,      I spoke with Shane he's calling to check in regarding upcoming testing that Jodi has to have done. Can you give him a call [phone]    Thanks,  [place]

## 2025-06-13 ENCOUNTER — APPOINTMENT (OUTPATIENT)
Dept: CARDIOLOGY | Facility: HOSPITAL | Age: 70
DRG: 438 | End: 2025-06-13
Payer: COMMERCIAL

## 2025-06-13 ENCOUNTER — HOSPITAL ENCOUNTER (INPATIENT)
Facility: HOSPITAL | Age: 70
DRG: 438 | End: 2025-06-13
Attending: EMERGENCY MEDICINE | Admitting: INTERNAL MEDICINE
Payer: COMMERCIAL

## 2025-06-13 DIAGNOSIS — R10.9 ACUTE ABDOMINAL PAIN: ICD-10-CM

## 2025-06-13 DIAGNOSIS — K86.1 CHRONIC PANCREATITIS, UNSPECIFIED PANCREATITIS TYPE (MULTI): ICD-10-CM

## 2025-06-13 DIAGNOSIS — E87.5 HYPERKALEMIA: Primary | ICD-10-CM

## 2025-06-13 LAB
ALBUMIN SERPL BCP-MCNC: 4.1 G/DL (ref 3.4–5)
ALP SERPL-CCNC: 114 U/L (ref 33–136)
ALT SERPL W P-5'-P-CCNC: 9 U/L (ref 7–45)
ANION GAP SERPL CALC-SCNC: 11 MMOL/L (ref 10–20)
ANION GAP SERPL CALC-SCNC: 16 MMOL/L (ref 10–20)
AST SERPL W P-5'-P-CCNC: 16 U/L (ref 9–39)
BASOPHILS # BLD AUTO: 0.06 X10*3/UL (ref 0–0.1)
BASOPHILS NFR BLD AUTO: 0.7 %
BILIRUB SERPL-MCNC: 0.3 MG/DL (ref 0–1.2)
BUN SERPL-MCNC: 18 MG/DL (ref 6–23)
BUN SERPL-MCNC: 19 MG/DL (ref 6–23)
CALCIUM SERPL-MCNC: 9.3 MG/DL (ref 8.6–10.3)
CALCIUM SERPL-MCNC: 9.9 MG/DL (ref 8.6–10.3)
CARDIAC TROPONIN I PNL SERPL HS: 5 NG/L (ref 0–13)
CHLORIDE SERPL-SCNC: 107 MMOL/L (ref 98–107)
CHLORIDE SERPL-SCNC: 109 MMOL/L (ref 98–107)
CO2 SERPL-SCNC: 17 MMOL/L (ref 21–32)
CO2 SERPL-SCNC: 21 MMOL/L (ref 21–32)
CREAT SERPL-MCNC: 1.46 MG/DL (ref 0.5–1.05)
CREAT SERPL-MCNC: 1.61 MG/DL (ref 0.5–1.05)
EGFRCR SERPLBLD CKD-EPI 2021: 35 ML/MIN/1.73M*2
EGFRCR SERPLBLD CKD-EPI 2021: 39 ML/MIN/1.73M*2
EOSINOPHIL # BLD AUTO: 0.11 X10*3/UL (ref 0–0.7)
EOSINOPHIL NFR BLD AUTO: 1.3 %
ERYTHROCYTE [DISTWIDTH] IN BLOOD BY AUTOMATED COUNT: 12.5 % (ref 11.5–14.5)
GLUCOSE BLD MANUAL STRIP-MCNC: 179 MG/DL (ref 74–99)
GLUCOSE SERPL-MCNC: 153 MG/DL (ref 74–99)
GLUCOSE SERPL-MCNC: 91 MG/DL (ref 74–99)
HCT VFR BLD AUTO: 33.9 % (ref 36–46)
HGB BLD-MCNC: 10.5 G/DL (ref 12–16)
IMM GRANULOCYTES # BLD AUTO: 0.04 X10*3/UL (ref 0–0.7)
IMM GRANULOCYTES NFR BLD AUTO: 0.5 % (ref 0–0.9)
LIPASE SERPL-CCNC: 15 U/L (ref 9–82)
LYMPHOCYTES # BLD AUTO: 2.23 X10*3/UL (ref 1.2–4.8)
LYMPHOCYTES NFR BLD AUTO: 26.2 %
MAGNESIUM SERPL-MCNC: 1.55 MG/DL (ref 1.6–2.4)
MCH RBC QN AUTO: 31.8 PG (ref 26–34)
MCHC RBC AUTO-ENTMCNC: 31 G/DL (ref 32–36)
MCV RBC AUTO: 103 FL (ref 80–100)
MONOCYTES # BLD AUTO: 0.51 X10*3/UL (ref 0.1–1)
MONOCYTES NFR BLD AUTO: 6 %
NEUTROPHILS # BLD AUTO: 5.56 X10*3/UL (ref 1.2–7.7)
NEUTROPHILS NFR BLD AUTO: 65.3 %
NRBC BLD-RTO: 0 /100 WBCS (ref 0–0)
PHOSPHATE SERPL-MCNC: 3.6 MG/DL (ref 2.5–4.9)
PLATELET # BLD AUTO: 187 X10*3/UL (ref 150–450)
POTASSIUM SERPL-SCNC: 4.2 MMOL/L (ref 3.5–5.3)
POTASSIUM SERPL-SCNC: 6.8 MMOL/L (ref 3.5–5.3)
PROT SERPL-MCNC: 7.5 G/DL (ref 6.4–8.2)
RBC # BLD AUTO: 3.3 X10*6/UL (ref 4–5.2)
SODIUM SERPL-SCNC: 134 MMOL/L (ref 136–145)
SODIUM SERPL-SCNC: 136 MMOL/L (ref 136–145)
WBC # BLD AUTO: 8.5 X10*3/UL (ref 4.4–11.3)

## 2025-06-13 PROCEDURE — 36415 COLL VENOUS BLD VENIPUNCTURE: CPT

## 2025-06-13 PROCEDURE — 2500000004 HC RX 250 GENERAL PHARMACY W/ HCPCS (ALT 636 FOR OP/ED): Performed by: EMERGENCY MEDICINE

## 2025-06-13 PROCEDURE — 2500000005 HC RX 250 GENERAL PHARMACY W/O HCPCS: Performed by: EMERGENCY MEDICINE

## 2025-06-13 PROCEDURE — 82947 ASSAY GLUCOSE BLOOD QUANT: CPT

## 2025-06-13 PROCEDURE — 83690 ASSAY OF LIPASE: CPT

## 2025-06-13 PROCEDURE — 80048 BASIC METABOLIC PNL TOTAL CA: CPT | Mod: CCI | Performed by: EMERGENCY MEDICINE

## 2025-06-13 PROCEDURE — 1210000001 HC SEMI-PRIVATE ROOM DAILY

## 2025-06-13 PROCEDURE — 80053 COMPREHEN METABOLIC PANEL: CPT

## 2025-06-13 PROCEDURE — 96366 THER/PROPH/DIAG IV INF ADDON: CPT

## 2025-06-13 PROCEDURE — 85025 COMPLETE CBC W/AUTO DIFF WBC: CPT

## 2025-06-13 PROCEDURE — 84100 ASSAY OF PHOSPHORUS: CPT | Performed by: EMERGENCY MEDICINE

## 2025-06-13 PROCEDURE — 99291 CRITICAL CARE FIRST HOUR: CPT | Performed by: EMERGENCY MEDICINE

## 2025-06-13 PROCEDURE — 84484 ASSAY OF TROPONIN QUANT: CPT

## 2025-06-13 PROCEDURE — 96374 THER/PROPH/DIAG INJ IV PUSH: CPT | Mod: 59

## 2025-06-13 PROCEDURE — 93005 ELECTROCARDIOGRAM TRACING: CPT

## 2025-06-13 PROCEDURE — 36415 COLL VENOUS BLD VENIPUNCTURE: CPT | Performed by: EMERGENCY MEDICINE

## 2025-06-13 PROCEDURE — 96365 THER/PROPH/DIAG IV INF INIT: CPT | Mod: 59

## 2025-06-13 PROCEDURE — 96375 TX/PRO/DX INJ NEW DRUG ADDON: CPT

## 2025-06-13 PROCEDURE — 83735 ASSAY OF MAGNESIUM: CPT | Performed by: EMERGENCY MEDICINE

## 2025-06-13 PROCEDURE — 94640 AIRWAY INHALATION TREATMENT: CPT

## 2025-06-13 PROCEDURE — 2500000002 HC RX 250 W HCPCS SELF ADMINISTERED DRUGS (ALT 637 FOR MEDICARE OP, ALT 636 FOR OP/ED): Performed by: EMERGENCY MEDICINE

## 2025-06-13 RX ORDER — DEXTROSE MONOHYDRATE 100 MG/ML
50 INJECTION, SOLUTION INTRAVENOUS CONTINUOUS
Status: DISPENSED | OUTPATIENT
Start: 2025-06-13 | End: 2025-06-14

## 2025-06-13 RX ORDER — DEXTROSE 50 % IN WATER (D50W) INTRAVENOUS SYRINGE
25 ONCE
Status: COMPLETED | OUTPATIENT
Start: 2025-06-13 | End: 2025-06-13

## 2025-06-13 RX ORDER — ONDANSETRON HYDROCHLORIDE 2 MG/ML
4 INJECTION, SOLUTION INTRAVENOUS ONCE
Status: COMPLETED | OUTPATIENT
Start: 2025-06-13 | End: 2025-06-13

## 2025-06-13 RX ORDER — MORPHINE SULFATE 4 MG/ML
4 INJECTION, SOLUTION INTRAMUSCULAR; INTRAVENOUS ONCE
Status: COMPLETED | OUTPATIENT
Start: 2025-06-13 | End: 2025-06-13

## 2025-06-13 RX ORDER — SODIUM BICARBONATE 1 MEQ/ML
50 SYRINGE (ML) INTRAVENOUS ONCE
Status: COMPLETED | OUTPATIENT
Start: 2025-06-13 | End: 2025-06-13

## 2025-06-13 RX ORDER — ALBUTEROL SULFATE 0.83 MG/ML
20 SOLUTION RESPIRATORY (INHALATION) ONCE
Status: COMPLETED | OUTPATIENT
Start: 2025-06-13 | End: 2025-06-13

## 2025-06-13 RX ORDER — CALCIUM GLUCONATE 20 MG/ML
2 INJECTION, SOLUTION INTRAVENOUS ONCE
Status: COMPLETED | OUTPATIENT
Start: 2025-06-13 | End: 2025-06-13

## 2025-06-13 RX ORDER — MAGNESIUM SULFATE HEPTAHYDRATE 40 MG/ML
2 INJECTION, SOLUTION INTRAVENOUS ONCE
Status: COMPLETED | OUTPATIENT
Start: 2025-06-13 | End: 2025-06-13

## 2025-06-13 RX ORDER — FUROSEMIDE 10 MG/ML
40 INJECTION INTRAMUSCULAR; INTRAVENOUS ONCE
Status: COMPLETED | OUTPATIENT
Start: 2025-06-13 | End: 2025-06-13

## 2025-06-13 RX ADMIN — MAGNESIUM SULFATE HEPTAHYDRATE 2 G: 40 INJECTION, SOLUTION INTRAVENOUS at 18:50

## 2025-06-13 RX ADMIN — CALCIUM GLUCONATE 2 G: 20 INJECTION, SOLUTION INTRAVENOUS at 18:24

## 2025-06-13 RX ADMIN — SODIUM CHLORIDE, SODIUM LACTATE, POTASSIUM CHLORIDE, AND CALCIUM CHLORIDE 1000 ML: .6; .31; .03; .02 INJECTION, SOLUTION INTRAVENOUS at 18:49

## 2025-06-13 RX ADMIN — ONDANSETRON 4 MG: 2 INJECTION, SOLUTION INTRAMUSCULAR; INTRAVENOUS at 18:14

## 2025-06-13 RX ADMIN — SODIUM BICARBONATE 50 MEQ: 84 INJECTION INTRAVENOUS at 18:16

## 2025-06-13 RX ADMIN — SODIUM ZIRCONIUM CYCLOSILICATE 10 G: 10 POWDER, FOR SUSPENSION ORAL at 18:40

## 2025-06-13 RX ADMIN — DEXTROSE MONOHYDRATE 25 G: 25 INJECTION, SOLUTION INTRAVENOUS at 18:21

## 2025-06-13 RX ADMIN — INSULIN HUMAN 10 UNITS: 100 INJECTION, SOLUTION PARENTERAL at 18:21

## 2025-06-13 RX ADMIN — MORPHINE SULFATE 4 MG: 4 INJECTION, SOLUTION INTRAMUSCULAR; INTRAVENOUS at 18:13

## 2025-06-13 RX ADMIN — ALBUTEROL SULFATE 20 MG: 2.5 SOLUTION RESPIRATORY (INHALATION) at 18:23

## 2025-06-13 RX ADMIN — FUROSEMIDE 40 MG: 10 INJECTION, SOLUTION INTRAMUSCULAR; INTRAVENOUS at 18:32

## 2025-06-13 RX ADMIN — DEXTROSE 50 ML/HR: 10 SOLUTION INTRAVENOUS at 18:38

## 2025-06-13 ASSESSMENT — PAIN - FUNCTIONAL ASSESSMENT: PAIN_FUNCTIONAL_ASSESSMENT: 0-10

## 2025-06-13 ASSESSMENT — PAIN SCALES - GENERAL: PAINLEVEL_OUTOF10: 0 - NO PAIN

## 2025-06-13 NOTE — ED PROVIDER NOTES
Limitations to History: None  Additional History Obtained from: Spouse    HPI:    Patient's presenting to the emergency department due to abdominal pain.  She has a history of chronic pancreatitis, follows with GI with Dr. Stern.  There is plans for an outpatient ERCP but due to increasing pain she presented to the emergency department for evaluation.  She denied any chest pain or changes in her breathing.  She has had decreased oral intake.  She notes no significant changes in her bowel movements or urination.  Denies any fevers or chills.  States her pain is epigastric, sharp and nonradiating.  Denies any other complaints or concerns at this time.    ------------------------------------------------------------------------------------------------------------------------------------------  Physical Exam:    ED Triage Vitals [06/13/25 1531]   Temperature Heart Rate Respirations BP   36.4 °C (97.5 °F) 68 18 171/61      Pulse Ox Temp src Heart Rate Source Patient Position   98 % -- -- --      BP Location FiO2 (%)     -- --        VS: As documented in the triage note and EMR flowsheet from this visit were reviewed.  General: Well appearing.  No apparent distress, appears malnourished   eyes: Pupils round and reactive. No scleral icterus. No conjunctival injection  HENT: Atraumatic. Normocephalic. Moist mucous membranes. Trachea midline  CV: RRR, No MRG. No pedal edema appreciated.  Resp: Clear to auscultation bilaterally. Non-labored.    GI: Soft, mild tenderness to palpation, epigastric. Nondistended. No guarding, rigidity or rebound  Skin: Warm, dry, intact. No systemic rashes or lesions appreciated.  Extremities: No deformities or pain out of proportion; pulses intact   Neuro: Alert. No focal motor or sensory deficits observed. Speech fluent. Answers questions appropriately.   Psych: Appropriate.  Henrique.    ------------------------------------------------------------------------------------------------------------------------------------------    Medical Decision Making  Patient's presenting to the emergency department after being referred by GI for nausea and abdominal pain.  Was seen and diagnosed with her chronic pancreatitis previously.  Was referred to [facility] for possible ERCP by GI.  Labs were obtained in triage and remarkable for hyperkalemia without hemolysis.    Disposition pending reevaluation after the above patient has had decreased oral intake which is likely the source of her hyperkalemia.  EKG was not consistent with hyperkalemia.  Will give hyper-K cocktail and reevaluate, will give IV fluids.  Patient will require admission.  Pending reevaluation after hyper-K cocktail.  External Records Reviewed: I reviewed recent and relevant outside records including: HIE/Community Record  Escalation of Care: Appropriate for Admission  Social Determinants Affecting Care:Multiple chronic illnesses  Prescription Drug Consideration: per orders  Diagnostic testing considered: per orders  Discussion of Management with Other Providers: I discussed the patient/results with: admitting team    Objective Data  I have independently interpreted the following labs, imaging studies and MDM added to ED Course  Labs Reviewed   CBC WITH AUTO DIFFERENTIAL - Abnormal       Result Value    WBC 8.5      nRBC 0.0      RBC 3.30 (*)     Hemoglobin 10.5 (*)     Hematocrit 33.9 (*)      (*)     MCH 31.8      MCHC 31.0 (*)     RDW 12.5      Platelets 187      Neutrophils % 65.3      Immature Granulocytes %, Automated 0.5      Lymphocytes % 26.2      Monocytes % 6.0      Eosinophils % 1.3      Basophils % 0.7      Neutrophils Absolute 5.56      Immature Granulocytes Absolute, Automated 0.04      Lymphocytes Absolute 2.23      Monocytes Absolute 0.51      Eosinophils Absolute 0.11      Basophils Absolute 0.06     COMPREHENSIVE  METABOLIC PANEL - Abnormal    Glucose 91      Sodium 134 (*)     Potassium 6.8 (*)     Chloride 109 (*)     Bicarbonate 21      Anion Gap 11      Urea Nitrogen 19      Creatinine 1.61 (*)     eGFR 35 (*)     Calcium 9.3      Albumin 4.1      Alkaline Phosphatase 114      Total Protein 7.5      AST 16      Bilirubin, Total 0.3      ALT 9     MAGNESIUM - Abnormal    Magnesium 1.55 (*)    BASIC METABOLIC PANEL - Abnormal    Glucose 153 (*)     Sodium 136      Potassium 4.2      Chloride 107      Bicarbonate 17 (*)     Anion Gap 16      Urea Nitrogen 18      Creatinine 1.46 (*)     eGFR 39 (*)     Calcium 9.9     POCT GLUCOSE - Abnormal    POCT Glucose 179 (*)    LIPASE - Normal    Lipase 15      Narrative:     Venipuncture immediately after or during the administration of Metamizole may lead to falsely low results. Testing should be performed immediately prior to Metamizole dosing.   TROPONIN I, HIGH SENSITIVITY - Normal    Troponin I, High Sensitivity 5      Narrative:     Less than 99th percentile of normal range cutoff-  Female and children under 18 years old <14 ng/L; Male <21 ng/L: Negative  Repeat testing should be performed if clinically indicated.     Female and children under 18 years old 14-50 ng/L; Male 21-50 ng/L:  Consistent with possible cardiac damage and possible increased clinical   risk. Serial measurements may help to assess extent of myocardial damage.     >50 ng/L: Consistent with cardiac damage, increased clinical risk and  myocardial infarction. Serial measurements may help assess extent of   myocardial damage.      NOTE: Children less than 1 year old may have higher baseline troponin   levels and results should be interpreted in conjunction with the overall   clinical context.     NOTE: Troponin I testing is performed using a different   testing methodology at [facility] than at other   [facility] hospitals. Direct result comparisons should only   be made within the same method.    PHOSPHORUS - Normal    Phosphorus 3.6     URINALYSIS WITH REFLEX CULTURE AND MICROSCOPIC    Narrative:     The following orders were created for panel order Urinalysis with Reflex Culture and Microscopic.  Procedure                               Abnormality         Status                     ---------                               -----------         ------                     Urinalysis with Reflex C...[[id number]]                                                 Extra Urine Gray Tube[[id number]]                                                         Please view results for these tests on the individual orders.   URINALYSIS WITH REFLEX CULTURE AND MICROSCOPIC   EXTRA URINE GRAY TUBE       No orders to display       ED Course  ED Course as of 06/13/25 2242   Fri Jun 13, 2025   1726 EKG reviewed and interpreted independently by me showing sinus bradycardia 57 bpm normal intervals no peaked T waves, T wave inversions in V1 and V2 that are unchanged compared to previous EKG on May 20, 2025 [LP]   2034 Repeat potassium showed improvement to 4.2, will discuss with medicine for admission. [LP]      ED Course User Index  [LP] Andria Low DO         Diagnoses as of 06/13/25 2242   Hyperkalemia   Chronic pancreatitis, unspecified pancreatitis type (Multi)       Procedure  Critical Care    Performed by: Andria Low DO  Authorized by: Andria Low DO    Critical care provider statement:     Critical care time (minutes):  40    Critical care time was exclusive of:  Teaching time and separately billable procedures and treating other patients    Critical care was necessary to treat or prevent imminent or life-threatening deterioration of the following conditions: hyperk.    Critical care was time spent personally by me on the following activities:  Blood draw for specimens, development of treatment plan with patient or surrogate, discussions with consultants, evaluation of patient's response to treatment, obtaining  history from patient or surrogate, ordering and performing treatments and interventions, ordering and review of laboratory studies, ordering and review of radiographic studies, pulse oximetry, re-evaluation of patient's condition and review of old charts    I assumed direction of critical care for this patient from another provider in my specialty: no      Care discussed with: admitting provider        Disposition: admit    Andria Low DO  Emergency Medicine  Medical Toxicology     Andria Low DO  06/13/25 2241       Andria Low DO  06/13/25 2242

## 2025-06-13 NOTE — ED TRIAGE NOTES
TRIAGE NOTE   I saw the patient as the Clinician in Triage and performed a brief history and physical exam, established acuity, and ordered appropriate tests to develop basic plan of care. Patient will be seen by an MARY JANE, resident and/or physician who will independently evaluate the patient. Please see subsequent provider notes for further details and disposition.     Brief HPI: In brief, Jodi Johnson is a 69 y.o. female that presents for abdominal pain.  Reports worsening epigastric pain over the past couple days.  Was seen at [facility]  told she needed previous stent or ERCP.  No other symptoms or concerns at this time.    Focused Physical exam:   Mild tenderness epigastric region.  No rebound tenderness or guarding.  Patient is sitting in the room in no acute distress  Plan/MDM:   Initiating basic labs, EKG, troponin, lipase.  Also initiating urinalysis.  Patient will be sent back to the ED for further evaluation and treatment.  I will not be following with this patient during her ED visit.  This is a preliminary evaluation during triage.      I evaluated this patient in triage with the RN. Due to the patients complaint labs and or imaging were ordered by myself in an attempt to expedite patient care however I am not participating in care after evaluation. This is a preliminary assessment. Pt does not appear in acute distress at this time. They will have a full evaluation as soon as possible. They will be cared for by another provider who will possibly order more labs, imaging or interventions. Pt did not have a full ROS or PE completed by myself however below is a summary with reasons for orders.  For the remainder of the patient's workup and ED course, please refer to the main ED provider note. We discussed need for diagnostic testing including laboratory studies and imaging.  We also discussed that patient may be asked to wait in the waiting room while these tests are pending.  They understand that if they  choose to leave without having the testing completed or resulted that we cannot rule out acute life threatening illnesses and the risks involved could lead to worsening condition, permanent disability or even death.     Please see subsequent provider note for further details and disposition

## 2025-06-13 NOTE — TELEPHONE ENCOUNTER
Call returned.  Questions answered.    She came to [facility] ED for evaluation.  Miller Mcrae, DO

## 2025-06-14 LAB
APPEARANCE UR: CLEAR
BILIRUB UR STRIP.AUTO-MCNC: NEGATIVE MG/DL
COLOR UR: COLORLESS
GLUCOSE UR STRIP.AUTO-MCNC: NORMAL MG/DL
KETONES UR STRIP.AUTO-MCNC: NEGATIVE MG/DL
LEUKOCYTE ESTERASE UR QL STRIP.AUTO: NEGATIVE
NITRITE UR QL STRIP.AUTO: NEGATIVE
PH UR STRIP.AUTO: 5 [PH]
PROT UR STRIP.AUTO-MCNC: NEGATIVE MG/DL
RBC # UR STRIP.AUTO: NEGATIVE MG/DL
SP GR UR STRIP.AUTO: 1
UROBILINOGEN UR STRIP.AUTO-MCNC: NORMAL MG/DL

## 2025-06-14 PROCEDURE — 2500000002 HC RX 250 W HCPCS SELF ADMINISTERED DRUGS (ALT 637 FOR MEDICARE OP, ALT 636 FOR OP/ED): Performed by: EMERGENCY MEDICINE

## 2025-06-14 PROCEDURE — 2500000001 HC RX 250 WO HCPCS SELF ADMINISTERED DRUGS (ALT 637 FOR MEDICARE OP): Performed by: INTERNAL MEDICINE

## 2025-06-14 PROCEDURE — 2500000004 HC RX 250 GENERAL PHARMACY W/ HCPCS (ALT 636 FOR OP/ED): Performed by: INTERNAL MEDICINE

## 2025-06-14 PROCEDURE — 99223 1ST HOSP IP/OBS HIGH 75: CPT | Performed by: INTERNAL MEDICINE

## 2025-06-14 PROCEDURE — 81003 URINALYSIS AUTO W/O SCOPE: CPT

## 2025-06-14 PROCEDURE — 1200000002 HC GENERAL ROOM WITH TELEMETRY DAILY

## 2025-06-14 RX ORDER — MIRTAZAPINE 15 MG/1
7.5 TABLET, FILM COATED ORAL NIGHTLY
Status: DISCONTINUED | OUTPATIENT
Start: 2025-06-14 | End: 2025-06-14

## 2025-06-14 RX ORDER — SUCRALFATE 1 G/1
1 TABLET ORAL 2 TIMES DAILY
Status: DISCONTINUED | OUTPATIENT
Start: 2025-06-14 | End: 2025-06-18 | Stop reason: HOSPADM

## 2025-06-14 RX ORDER — PAROXETINE 20 MG/1
40 TABLET, FILM COATED ORAL DAILY
Status: DISCONTINUED | OUTPATIENT
Start: 2025-06-14 | End: 2025-06-14 | Stop reason: WASHOUT

## 2025-06-14 RX ORDER — MIRTAZAPINE 15 MG/1
15 TABLET, FILM COATED ORAL NIGHTLY
COMMUNITY

## 2025-06-14 RX ORDER — LISINOPRIL 20 MG/1
20 TABLET ORAL DAILY
Status: DISCONTINUED | OUTPATIENT
Start: 2025-06-14 | End: 2025-06-18 | Stop reason: HOSPADM

## 2025-06-14 RX ORDER — TRAZODONE HYDROCHLORIDE 50 MG/1
100 TABLET ORAL NIGHTLY
Status: DISCONTINUED | OUTPATIENT
Start: 2025-06-14 | End: 2025-06-18 | Stop reason: HOSPADM

## 2025-06-14 RX ORDER — ONDANSETRON 4 MG/1
4 TABLET, ORALLY DISINTEGRATING ORAL EVERY 8 HOURS PRN
Status: DISCONTINUED | OUTPATIENT
Start: 2025-06-14 | End: 2025-06-18 | Stop reason: HOSPADM

## 2025-06-14 RX ORDER — SODIUM CHLORIDE 9 MG/ML
83 INJECTION, SOLUTION INTRAVENOUS CONTINUOUS
Status: ACTIVE | OUTPATIENT
Start: 2025-06-14 | End: 2025-06-15

## 2025-06-14 RX ORDER — PANTOPRAZOLE SODIUM 40 MG/1
40 TABLET, DELAYED RELEASE ORAL
Status: DISCONTINUED | OUTPATIENT
Start: 2025-06-14 | End: 2025-06-18 | Stop reason: HOSPADM

## 2025-06-14 RX ORDER — POLYETHYLENE GLYCOL 3350 17 G/17G
17 POWDER, FOR SOLUTION ORAL DAILY
Status: DISCONTINUED | OUTPATIENT
Start: 2025-06-14 | End: 2025-06-18 | Stop reason: HOSPADM

## 2025-06-14 RX ORDER — BUPROPION HYDROCHLORIDE 150 MG/1
150 TABLET ORAL
Status: DISCONTINUED | OUTPATIENT
Start: 2025-06-14 | End: 2025-06-14 | Stop reason: WASHOUT

## 2025-06-14 RX ORDER — LANOLIN ALCOHOL/MO/W.PET/CERES
400 CREAM (GRAM) TOPICAL 2 TIMES DAILY
Status: DISCONTINUED | OUTPATIENT
Start: 2025-06-14 | End: 2025-06-18 | Stop reason: HOSPADM

## 2025-06-14 RX ORDER — MORPHINE SULFATE 2 MG/ML
2 INJECTION, SOLUTION INTRAMUSCULAR; INTRAVENOUS EVERY 4 HOURS PRN
Status: DISCONTINUED | OUTPATIENT
Start: 2025-06-14 | End: 2025-06-16

## 2025-06-14 RX ORDER — MIRTAZAPINE 15 MG/1
15 TABLET, FILM COATED ORAL NIGHTLY
Status: DISCONTINUED | OUTPATIENT
Start: 2025-06-14 | End: 2025-06-18 | Stop reason: HOSPADM

## 2025-06-14 RX ORDER — HYDROXYZINE HYDROCHLORIDE 25 MG/1
25 TABLET, FILM COATED ORAL EVERY 8 HOURS PRN
Status: DISCONTINUED | OUTPATIENT
Start: 2025-06-14 | End: 2025-06-18 | Stop reason: HOSPADM

## 2025-06-14 RX ORDER — ONDANSETRON HYDROCHLORIDE 2 MG/ML
4 INJECTION, SOLUTION INTRAVENOUS EVERY 4 HOURS PRN
Status: DISCONTINUED | OUTPATIENT
Start: 2025-06-14 | End: 2025-06-18 | Stop reason: HOSPADM

## 2025-06-14 RX ORDER — COLESTIPOL HYDROCHLORIDE 1 G/1
1 TABLET ORAL 2 TIMES DAILY
Status: DISCONTINUED | OUTPATIENT
Start: 2025-06-14 | End: 2025-06-18 | Stop reason: HOSPADM

## 2025-06-14 RX ORDER — DOCUSATE SODIUM 100 MG/1
100 CAPSULE, LIQUID FILLED ORAL DAILY
Status: DISCONTINUED | OUTPATIENT
Start: 2025-06-14 | End: 2025-06-18 | Stop reason: HOSPADM

## 2025-06-14 RX ADMIN — SUCRALFATE 1 G: 1 TABLET ORAL at 11:12

## 2025-06-14 RX ADMIN — MORPHINE SULFATE 2 MG: 2 INJECTION, SOLUTION INTRAMUSCULAR; INTRAVENOUS at 08:27

## 2025-06-14 RX ADMIN — MORPHINE SULFATE 2 MG: 2 INJECTION, SOLUTION INTRAMUSCULAR; INTRAVENOUS at 13:51

## 2025-06-14 RX ADMIN — SODIUM ZIRCONIUM CYCLOSILICATE 10 G: 10 POWDER, FOR SUSPENSION ORAL at 03:11

## 2025-06-14 RX ADMIN — PANTOPRAZOLE SODIUM 40 MG: 40 TABLET, DELAYED RELEASE ORAL at 08:27

## 2025-06-14 RX ADMIN — ONDANSETRON 4 MG: 2 INJECTION, SOLUTION INTRAMUSCULAR; INTRAVENOUS at 17:38

## 2025-06-14 RX ADMIN — MIRTAZAPINE 15 MG: 15 TABLET, FILM COATED ORAL at 22:05

## 2025-06-14 RX ADMIN — MORPHINE SULFATE 2 MG: 2 INJECTION, SOLUTION INTRAMUSCULAR; INTRAVENOUS at 20:41

## 2025-06-14 RX ADMIN — COLESTIPOL HYDROCHLORIDE 1 G: 1 TABLET, FILM COATED ORAL at 09:49

## 2025-06-14 RX ADMIN — TRAZODONE HYDROCHLORIDE 100 MG: 50 TABLET ORAL at 22:05

## 2025-06-14 RX ADMIN — Medication 1 TABLET: at 08:26

## 2025-06-14 RX ADMIN — PANCRELIPASE 1 CAPSULE: 120000; 24000; 76000 CAPSULE, DELAYED RELEASE PELLETS ORAL at 11:12

## 2025-06-14 RX ADMIN — POLYETHYLENE GLYCOL 3350 17 G: 17 POWDER, FOR SOLUTION ORAL at 14:59

## 2025-06-14 RX ADMIN — DOCUSATE SODIUM 100 MG: 100 CAPSULE, LIQUID FILLED ORAL at 14:59

## 2025-06-14 RX ADMIN — Medication 1 TABLET: at 22:05

## 2025-06-14 RX ADMIN — PANCRELIPASE 1 CAPSULE: 120000; 24000; 76000 CAPSULE, DELAYED RELEASE PELLETS ORAL at 08:26

## 2025-06-14 RX ADMIN — SODIUM ZIRCONIUM CYCLOSILICATE 10 G: 10 POWDER, FOR SUSPENSION ORAL at 08:29

## 2025-06-14 RX ADMIN — COLESTIPOL HYDROCHLORIDE 1 G: 1 TABLET, FILM COATED ORAL at 23:13

## 2025-06-14 RX ADMIN — SODIUM CHLORIDE 83 ML/HR: 0.9 INJECTION, SOLUTION INTRAVENOUS at 14:53

## 2025-06-14 RX ADMIN — LISINOPRIL 20 MG: 20 TABLET ORAL at 08:27

## 2025-06-14 RX ADMIN — PANCRELIPASE 1 CAPSULE: 120000; 24000; 76000 CAPSULE, DELAYED RELEASE PELLETS ORAL at 16:10

## 2025-06-14 SDOH — SOCIAL STABILITY: SOCIAL NETWORK
DO YOU BELONG TO ANY CLUBS OR ORGANIZATIONS SUCH AS CHURCH GROUPS, UNIONS, FRATERNAL OR ATHLETIC GROUPS, OR SCHOOL GROUPS?: NO

## 2025-06-14 SDOH — HEALTH STABILITY: MENTAL HEALTH: HOW MANY DRINKS CONTAINING ALCOHOL DO YOU HAVE ON A TYPICAL DAY WHEN YOU ARE DRINKING?: PATIENT DOES NOT DRINK

## 2025-06-14 SDOH — ECONOMIC STABILITY: FOOD INSECURITY: WITHIN THE PAST 12 MONTHS, THE FOOD YOU BOUGHT JUST DIDN'T LAST AND YOU DIDN'T HAVE MONEY TO GET MORE.: NEVER TRUE

## 2025-06-14 SDOH — SOCIAL STABILITY: SOCIAL INSECURITY: WERE YOU ABLE TO COMPLETE ALL THE BEHAVIORAL HEALTH SCREENINGS?: YES

## 2025-06-14 SDOH — SOCIAL STABILITY: SOCIAL NETWORK: HOW OFTEN DO YOU ATTEND MEETINGS OF THE CLUBS OR ORGANIZATIONS YOU BELONG TO?: NEVER

## 2025-06-14 SDOH — HEALTH STABILITY: MENTAL HEALTH
DO YOU FEEL STRESS - TENSE, RESTLESS, NERVOUS, OR ANXIOUS, OR UNABLE TO SLEEP AT NIGHT BECAUSE YOUR MIND IS TROUBLED ALL THE TIME - THESE DAYS?: TO SOME EXTENT

## 2025-06-14 SDOH — SOCIAL STABILITY: SOCIAL NETWORK: HOW OFTEN DO YOU ATTEND CHURCH OR RELIGIOUS SERVICES?: NEVER

## 2025-06-14 SDOH — SOCIAL STABILITY: SOCIAL INSECURITY: ARE YOU MARRIED, WIDOWED, DIVORCED, SEPARATED, NEVER MARRIED, OR LIVING WITH A PARTNER?: DIVORCED

## 2025-06-14 SDOH — SOCIAL STABILITY: SOCIAL NETWORK: HOW OFTEN DO YOU GET TOGETHER WITH FRIENDS OR RELATIVES?: MORE THAN THREE TIMES A WEEK

## 2025-06-14 SDOH — SOCIAL STABILITY: SOCIAL INSECURITY: HAVE YOU HAD ANY THOUGHTS OF HARMING ANYONE ELSE?: NO

## 2025-06-14 SDOH — HEALTH STABILITY: MENTAL HEALTH: HOW OFTEN DO YOU HAVE A DRINK CONTAINING ALCOHOL?: NEVER

## 2025-06-14 SDOH — ECONOMIC STABILITY: FOOD INSECURITY: HOW HARD IS IT FOR YOU TO PAY FOR THE VERY BASICS LIKE FOOD, HOUSING, MEDICAL CARE, AND HEATING?: NOT VERY HARD

## 2025-06-14 SDOH — SOCIAL STABILITY: SOCIAL INSECURITY: WITHIN THE LAST YEAR, HAVE YOU BEEN HUMILIATED OR EMOTIONALLY ABUSED IN OTHER WAYS BY YOUR PARTNER OR EX-PARTNER?: NO

## 2025-06-14 SDOH — SOCIAL STABILITY: SOCIAL INSECURITY: ARE THERE ANY APPARENT SIGNS OF INJURIES/BEHAVIORS THAT COULD BE RELATED TO ABUSE/NEGLECT?: NO

## 2025-06-14 SDOH — HEALTH STABILITY: MENTAL HEALTH: HOW OFTEN DO YOU HAVE SIX OR MORE DRINKS ON ONE OCCASION?: NEVER

## 2025-06-14 SDOH — ECONOMIC STABILITY: INCOME INSECURITY: IN THE PAST 12 MONTHS HAS THE ELECTRIC, GAS, OIL, OR WATER COMPANY THREATENED TO SHUT OFF SERVICES IN YOUR HOME?: NO

## 2025-06-14 SDOH — ECONOMIC STABILITY: FOOD INSECURITY: WITHIN THE PAST 12 MONTHS, YOU WORRIED THAT YOUR FOOD WOULD RUN OUT BEFORE YOU GOT THE MONEY TO BUY MORE.: NEVER TRUE

## 2025-06-14 SDOH — ECONOMIC STABILITY: HOUSING INSECURITY: IN THE LAST 12 MONTHS, WAS THERE A TIME WHEN YOU WERE NOT ABLE TO PAY THE MORTGAGE OR RENT ON TIME?: NO

## 2025-06-14 SDOH — ECONOMIC STABILITY: TRANSPORTATION INSECURITY: IN THE PAST 12 MONTHS, HAS LACK OF TRANSPORTATION KEPT YOU FROM MEDICAL APPOINTMENTS OR FROM GETTING MEDICATIONS?: YES

## 2025-06-14 SDOH — SOCIAL STABILITY: SOCIAL INSECURITY: HAS ANYONE EVER THREATENED TO HURT YOUR FAMILY OR YOUR PETS?: NO

## 2025-06-14 SDOH — SOCIAL STABILITY: SOCIAL INSECURITY: HAVE YOU HAD THOUGHTS OF HARMING ANYONE ELSE?: NO

## 2025-06-14 SDOH — SOCIAL STABILITY: SOCIAL INSECURITY: DO YOU FEEL ANYONE HAS EXPLOITED OR TAKEN ADVANTAGE OF YOU FINANCIALLY OR OF YOUR PERSONAL PROPERTY?: NO

## 2025-06-14 SDOH — SOCIAL STABILITY: SOCIAL INSECURITY: ARE YOU OR HAVE YOU BEEN THREATENED OR ABUSED PHYSICALLY, EMOTIONALLY, OR SEXUALLY BY ANYONE?: NO

## 2025-06-14 SDOH — SOCIAL STABILITY: SOCIAL INSECURITY: ABUSE: ADULT

## 2025-06-14 SDOH — SOCIAL STABILITY: SOCIAL INSECURITY: DO YOU FEEL UNSAFE GOING BACK TO THE PLACE WHERE YOU ARE LIVING?: NO

## 2025-06-14 SDOH — SOCIAL STABILITY: SOCIAL INSECURITY: DOES ANYONE TRY TO KEEP YOU FROM HAVING/CONTACTING OTHER FRIENDS OR DOING THINGS OUTSIDE YOUR HOME?: NO

## 2025-06-14 ASSESSMENT — PAIN - FUNCTIONAL ASSESSMENT
PAIN_FUNCTIONAL_ASSESSMENT: 0-10

## 2025-06-14 ASSESSMENT — PAIN SCALES - GENERAL
PAINLEVEL_OUTOF10: 3
PAINLEVEL_OUTOF10: 7
PAINLEVEL_OUTOF10: 3
PAINLEVEL_OUTOF10: 8
PAINLEVEL_OUTOF10: 5 - MODERATE PAIN
PAINLEVEL_OUTOF10: 7
PAINLEVEL_OUTOF10: 8
PAINLEVEL_OUTOF10: 2

## 2025-06-14 ASSESSMENT — COGNITIVE AND FUNCTIONAL STATUS - GENERAL
MOBILITY SCORE: 24
PATIENT BASELINE BEDBOUND: NO
MOBILITY SCORE: 24
DAILY ACTIVITIY SCORE: 24
MOBILITY SCORE: 24

## 2025-06-14 ASSESSMENT — PATIENT HEALTH QUESTIONNAIRE - PHQ9
SUM OF ALL RESPONSES TO PHQ9 QUESTIONS 1 & 2: 0
2. FEELING DOWN, DEPRESSED OR HOPELESS: NOT AT ALL
1. LITTLE INTEREST OR PLEASURE IN DOING THINGS: NOT AT ALL

## 2025-06-14 ASSESSMENT — ACTIVITIES OF DAILY LIVING (ADL)
WALKS IN HOME: INDEPENDENT
JUDGMENT_ADEQUATE_SAFELY_COMPLETE_DAILY_ACTIVITIES: YES
ASSISTIVE_DEVICE: EYEGLASSES;DENTURES UPPER;DENTURES LOWER
BATHING: INDEPENDENT
TOILETING: INDEPENDENT
FEEDING YOURSELF: INDEPENDENT
HEARING - LEFT EAR: FUNCTIONAL
GROOMING: INDEPENDENT
HEARING - LEFT EAR: FUNCTIONAL
GROOMING: INDEPENDENT
DRESSING YOURSELF: INDEPENDENT
TOILETING: INDEPENDENT
WALKS IN HOME: INDEPENDENT
HEARING - RIGHT EAR: FUNCTIONAL
ADEQUATE_TO_COMPLETE_ADL: YES
PATIENT'S MEMORY ADEQUATE TO SAFELY COMPLETE DAILY ACTIVITIES?: YES
JUDGMENT_ADEQUATE_SAFELY_COMPLETE_DAILY_ACTIVITIES: YES
ADEQUATE_TO_COMPLETE_ADL: YES
LACK_OF_TRANSPORTATION: NO
DRESSING YOURSELF: INDEPENDENT
BATHING: INDEPENDENT
HEARING - RIGHT EAR: FUNCTIONAL
FEEDING YOURSELF: INDEPENDENT
PATIENT'S MEMORY ADEQUATE TO SAFELY COMPLETE DAILY ACTIVITIES?: YES

## 2025-06-14 ASSESSMENT — PAIN DESCRIPTION - LOCATION
LOCATION: GENERALIZED
LOCATION: ABDOMEN
LOCATION: ABDOMEN

## 2025-06-14 ASSESSMENT — LIFESTYLE VARIABLES
PRESCIPTION_ABUSE_PAST_12_MONTHS: NO
SKIP TO QUESTIONS 9-10: 1
AUDIT-C TOTAL SCORE: 0
AUDIT-C TOTAL SCORE: 0
HOW OFTEN DO YOU HAVE A DRINK CONTAINING ALCOHOL: NEVER
SKIP TO QUESTIONS 9-10: 1
SUBSTANCE_ABUSE_PAST_12_MONTHS: YES
HOW MANY STANDARD DRINKS CONTAINING ALCOHOL DO YOU HAVE ON A TYPICAL DAY: PATIENT DOES NOT DRINK
AUDIT-C TOTAL SCORE: 0
HOW OFTEN DO YOU HAVE 6 OR MORE DRINKS ON ONE OCCASION: NEVER

## 2025-06-14 ASSESSMENT — ENCOUNTER SYMPTOMS: ABDOMINAL PAIN: 1

## 2025-06-14 ASSESSMENT — PAIN DESCRIPTION - DESCRIPTORS
DESCRIPTORS: ACHING
DESCRIPTORS: ACHING

## 2025-06-14 ASSESSMENT — PAIN DESCRIPTION - ORIENTATION: ORIENTATION: MID

## 2025-06-14 NOTE — H&P
HISTORY AND PHYSICAL EXAMINATION    PATIENT NAME: Jodi Johnson    MRN: [id number]  SERVICE DATE: 6/14/2025       PRIMARY CARE PHYSICIAN: Albert Corea MD          ASSESSMENT AND PLAN     Acute on chronic epig abdo pain.  Hyperkalemia on admission, currently better.  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 Awaiting ERCP      PLAN:   Regular diet with diet supplements, dietitian consult.  GI consult noted.  Planning ERCP is planned on 6/25/2025.  Counseled on abstaining from alcohol abuse.  Continue.  Pancreatic supplements.  Stop potassium supplementation.  HOLD lisinopril for WESLEY.  Can stop additional dose of Lokelma.  Continue Remeron.  Continue Zyprexa for bipolar.  DVT prophylaxis      Discussed with nurses/case management team and the specialists involved in this patient's care. Reviewed the EMR and documentation from other care-givers.    SUBJECTIVE  CHIEF COMPLAINT:  abdo pain and poor appetite    HPI: This is a 69 y.o. female who came to ER for increasing abdominal pain and ongoing poor appetite.  Patient has had prior GI workup and has seen Dr. Stern in the past.  She is scheduled to have ERCP on 6/25/2025.  She could not bear the pain any longer and thus came to the ER for further help.    She has had no additional GI symptoms like emesis or diarrhea.  Last BM was 2 days ago.    Workup in the ER showed hyperkalemia, creatinine slightly higher than baseline.  Patient was given Lokelma with improvement in potassium levels.  Patient admitted for further GI evaluation/management.      PAST MEDICAL HISTORY: Medical History[1]  PAST SURGICAL HISTORY: Surgical History[2]  FAMILY HISTORY: Family History[3]  SOCIAL HISTORY: Social History[4]    MEDICATIONS: Prior to Admission Medications  Prescriptions Prior to Admission[5]   CURRENT ALLERGIES: RX Allergies[6]    COMPLETE REVIEW OF SYSTEMS:       GENERAL: No fever, appetite stable.  HEENT: No epistaxis, no mouth ulcers  NECK: no neck pain  RESPIRATORY: No new resp symptoms.  CARDIOVASCULAR: No cp, no leg edema, No orthopnea  GI: see HPI. No GI bleed.  : No hematuria, no dysuria  MUSCULOSKELETAL: No new jt pains or swelling  SKIN: No rashes, no ulcers  PSYCH: +hx bipolar disorder  HEMATOLOGY/LYMPHOLOGY: No hx of VTE  ENDOCRINE: No hx of DM  NEURO: No hx of seizures, No hx of CVA      OBJECTIVE  PHYSICAL EXAM:       6/14/2025     1:15 AM 6/14/2025     1:30 AM 6/14/2025     3:30 AM 6/14/2025     4:12 AM 6/14/2025     7:19 AM 6/14/2025     8:27 AM 6/14/2025    11:51 AM   Vitals   Systolic 144 117 123  127 124 108   Diastolic 59 59 61  61 62 62   BP Location     Left arm Left arm Right arm   Heart Rate 86 77   78 76 75   Temp   36.6 °C (97.9 °F)  36.7 °C (98.1 °F) 36.5 °C (97.7 °F) 36.6 °C (97.9 °F)   Resp 18 13   20     Height    1.524 m (5')      Weight (lb)    80      BMI    15.62 kg/m2      BSA (m2)    1.24 m2        Body mass index is 15.62 kg/m².    GENERAL: awake, alert, Ox3, cooperative resting comfortably. thin  SKIN: Skin turgor normal. No rashes  HEENT: EOMI, no epistaxis, Moist mucosa.  LUNGS: Bilat breath sounds, with no added sounds  CARDIAC: REGULAR. no rubs, no murmur  ABDOMEN: soft, mild epig tender. +BS.  EXTREMITIES: No edema, Good capillary refill.   NEURO: Insight GOOD. No invol movements  MUSCULOSKELETAL: No acute inflammation       .Current Medications[7]    DATA:   Diagnostic tests reviewed for today's visit:    Most recent labs  Results from last 7 days   Lab Units 06/13/25  1629   WBC AUTO x10*3/uL 8.5   HEMOGLOBIN g/dL 10.5*   HEMATOCRIT % 33.9*   PLATELETS AUTO x10*3/uL 187     Results from last 7 days   Lab Units 06/13/25  1950 06/13/25  1629   SODIUM mmol/L 136 134*   POTASSIUM mmol/L 4.2 6.8*   CHLORIDE mmol/L 107 109*   CO2 mmol/L 17* 21   BUN mg/dL 18 19   CREATININE mg/dL 1.46* 1.61*   CALCIUM mg/dL 9.9 9.3   PROTEIN TOTAL  g/dL  --  7.5   BILIRUBIN TOTAL mg/dL  --  0.3   ALK PHOS U/L  --  114   ALT U/L  --  9   AST U/L  --  16   GLUCOSE mg/dL 153* 91       Results from last 7 days   Lab Units 06/13/25  1940   POCT GLUCOSE mg/dL 179*        EKG: SR. QTC normal.      SIGNATURE: Garry Hinojosa MD  DATE: June 14, 2025  TIME: 1:23 PM         [1]   Past Medical History:  Diagnosis Date    Age-related nuclear cataract of both eyes     Irregular astigmatism, bilateral     Unspecified disorder of refraction    [2]   Past Surgical History:  Procedure Laterality Date    BREAST LUMPECTOMY  05/24/2013    Breast Surgery Lumpectomy    COLONOSCOPY  2017    normal. Due in 2027    CT GUIDED PERCUTANEOUS BIOPSY MEDIASTINUM  06/24/2022    CT GUIDED PERCUTANEOUS BIOPSY MEDIASTINUM 6/24/2022 [facility] CLINICAL LEGACY    US GUIDED ABSCESS FLUID COLLECTION DRAINAGE  06/06/2022    US GUIDED ABSCESS FLUID COLLECTION DRAINAGE 6/6/2022 [facility] CLINICAL LEGACY    US GUIDED ABSCESS FLUID COLLECTION DRAINAGE  06/06/2022    US GUIDED ABSCESS FLUID COLLECTION DRAINAGE 6/6/2022 [facility] CLINICAL LEGACY   [3]   Family History  Problem Relation Name Age of Onset    Heart disease Mother  78    Hypertension Mother      Heart disease Father      Other (pacemaker) Father      Coronary artery disease Brother  55    Other (stent pacement) Brother     [4]   Social History  Tobacco Use    Smoking status: Every Day     Current packs/day: 2.00     Average packs/day: 2.0 packs/day for 48.4 years (96.9 ttl pk-yrs)     Types: Cigarettes     Start date: 1977     Passive exposure: Never    Smokeless tobacco: Never   Vaping Use    Vaping status: Never Used   Substance Use Topics    Alcohol use: Not Currently    Drug use: Yes     Types: Marijuana   [5]   Medications Prior to Admission   Medication Sig Dispense Refill Last Dose/Taking    calcium carbonate-vitamin D3 600 mg-20 mcg (800 unit) tablet Take 1 tablet by mouth once daily.   Unknown    calcium citrate-vitamin D3 (Citracal+D) 315 mg-5  mcg (200 unit) tablet     Unknown    colestipol (Colestid) 1 gram tablet Take 1 tablet (1 g) by mouth 2 times a day. 180 tablet 3 Unknown    cyclobenzaprine (Flexeril) 10 mg tablet Take 1 tablet (10 mg) by mouth 2 times a day as needed for muscle spasms for up to 10 days. 10 tablet 0     diclofenac sodium 1 % kit 2 grams Transdermal every 4 hours for 30 day(s)   Unknown    hydrOXYzine HCL (Atarax) 25 mg tablet Take 1 tablet (25 mg) by mouth every 8 hours if needed for itching.   Unknown    lipase-protease-amylase (Creon) 24,000-76,000 -120,000 unit capsule TAKE 1 CAPSULE BY MOUTH THREE TIMES DAILY WITH MEALS 270 capsule 0 Unknown    lisinopril 20 mg tablet Take 1 tablet (20 mg) by mouth once daily.   Unknown    magnesium oxide (Mag-Ox) 400 mg (241.3 mg magnesium) tablet TAKE ONE TABLET BY MOUTH TWICE DAILY 180 tablet 0 Unknown    melatonin 3 mg capsule Take 1 capsule by mouth once daily at bedtime.   Unknown    mirtazapine (Remeron) 15 mg tablet Take 1 tablet (15 mg) by mouth once daily at bedtime.       multivit-minerals/folic acid (ADULT ONE DAILY MULTIVITAMIN ORAL) 1 tablet Orally Once a day   Unknown    naproxen (Naprosyn) 500 mg tablet Take 1 tablet (500 mg) by mouth 2 times a day as needed for mild pain (1 - 3). 60 tablet 5     ondansetron ODT (Zofran-ODT) 4 mg disintegrating tablet Take 1 tablet (4 mg) by mouth every 8 hours if needed for nausea. 90 tablet 0 Unknown    pancrelipase, Lip-Prot-Amyl, (Creon) 12,000-38,000 -60,000 unit capsule     Unknown    pantoprazole (ProtoNix) 40 mg EC tablet TAKE ONE TABLET BY MOUTH ONCE DAILY 30 tablet 0 Unknown    potassium chloride CR 20 mEq ER tablet TAKE ONE TABLET BY MOUTH ONCE A DAY 90 tablet 0 Unknown    QUEtiapine (SEROquel) 25 mg tablet Take 0.5 tablets (12.5 mg) by mouth once daily at bedtime.   Unknown    sucralfate (Carafate) 1 gram tablet Take 1 tablet (1 g) by mouth 2 times a day. Lunch and bedtime   Unknown    traMADol (Ultram) 50 mg tablet Take 1 tablet (50  mg) by mouth every 12 hours if needed for moderate pain (4 - 6). 10 tablet 0 Unknown    traZODone (Desyrel) 100 mg tablet Take 1 tablet (100 mg) by mouth once daily at bedtime.   Unknown   [6]   Allergies  Allergen Reactions    Penicillins Unknown   [7]   Current Facility-Administered Medications:     colestipol (Colestid) tablet 1 g, 1 g, oral, BID, Albert Corea MD, 1 g at 06/14/25 0949    hydrOXYzine HCL (Atarax) tablet 25 mg, 25 mg, oral, q8h PRN, Albert Corea MD    lipase-protease-amylase (Creon) 24,000-76,000 -120,000 unit per capsule 1 capsule, 1 capsule, oral, TID, Albert Corea MD, 1 capsule at 06/14/25 1112    lisinopril tablet 20 mg, 20 mg, oral, Daily, Albert Corea MD, 20 mg at 06/14/25 0827    magnesium oxide (Mag-Ox) 400 mg (241.3 mg elemental) tablet 1 tablet, 400 mg of magnesium oxide, oral, BID, Albert Corea MD, 1 tablet at 06/14/25 0826    mirtazapine (Remeron) tablet 15 mg, 15 mg, oral, Nightly, Albert Corea MD    morphine injection 2 mg, 2 mg, intravenous, q4h PRN, Albert Corea MD, 2 mg at 06/14/25 0827    ondansetron (Zofran) injection 4 mg, 4 mg, intravenous, q4h PRN, Albert Corea MD    ondansetron ODT (Zofran-ODT) disintegrating tablet 4 mg, 4 mg, oral, q8h PRN, Albert Corea MD    pantoprazole (ProtoNix) EC tablet 40 mg, 40 mg, oral, Daily before breakfast, Albert Corea MD, 40 mg at 06/14/25 0827    sodium zirconium cyclosilicate (Lokelma) packet 10 g, 10 g, oral, q8h, Andria Low DO, 10 g at 06/14/25 0829    sucralfate (Carafate) tablet 1 g, 1 g, oral, BID, Albert Corea MD, 1 g at 06/14/25 1112    traZODone (Desyrel) tablet 100 mg, 100 mg, oral, Nightly, Albert Corea MD

## 2025-06-14 NOTE — CARE PLAN
The patient's goals for the shift include to get rest    The clinical goals for the shift include to reduce pain and promote safe environment      Problem: Pain - Adult  Goal: Verbalizes/displays adequate comfort level or baseline comfort level  Outcome: Progressing  Flowsheets (Taken 6/14/2025 0428)  Verbalizes/displays adequate comfort level or baseline comfort level:   Encourage patient to monitor pain and request assistance   Assess pain using appropriate pain scale   Administer analgesics based on type and severity of pain and evaluate response   Implement non-pharmacological measures as appropriate and evaluate response   Consider cultural and social influences on pain and pain management   Notify Licensed Independent Practitioner if interventions unsuccessful or patient reports new pain     Problem: Safety - Adult  Goal: Free from fall injury  Outcome: Progressing  Flowsheets (Taken 6/14/2025 0428)  Free from fall injury:   Instruct family/caregiver on patient safety   Based on caregiver fall risk screen, instruct family/caregiver to ask for assistance with transferring infant if caregiver noted to have fall risk factors     Problem: Discharge Planning  Goal: Discharge to home or other facility with appropriate resources  Outcome: Progressing  Flowsheets (Taken 6/14/2025 0428)  Discharge to home or other facility with appropriate resources:   Identify barriers to discharge with patient and caregiver   Identify discharge learning needs (meds, wound care, etc)     Problem: Chronic Conditions and Co-morbidities  Goal: Patient's chronic conditions and co-morbidity symptoms are monitored and maintained or improved  Outcome: Progressing  Flowsheets (Taken 6/14/2025 0428)  Care Plan - Patient's Chronic Conditions and Co-Morbidity Symptoms are Monitored and Maintained or Improved:   Monitor and assess patient's chronic conditions and comorbid symptoms for stability, deterioration, or improvement   Update acute care  plan with appropriate goals if chronic or comorbid symptoms are exacerbated and prevent overall improvement and discharge

## 2025-06-14 NOTE — CONSULTS
Inpatient consult to Gastroenterology  Consult performed by: Brett Julio, APRN-CNP  Consult ordered by: Albert Corea MD  Reason for consult: Abdominal pain and ERCP          Reason For Consult  Abdominal pain and ERCP    History Of Present Illness  Jodi Johnson is a 69 y.o. female with a PMHx s/f GERD, anxiety, depression,  chronic pancreatitis related to EtOH s/p [place]-Naida lateral pancreaticojejunostomy, CBD stricture, pancreatic duct stricture s/p multiple ERCPs (last ERCP in 2/2023 with Dr. Mcrae s/p pancreatic sphincterotomy) who presented to [facility] ER on 6/13/2025 with worsening abdominal pain.  Patient states that pain is chronic in nature and is located in the epigastric area.  It is on and off.  Denies fever/chills, nausea, vomiting, diarrhea, constipation, and overt GI bleeding.  States that she feels better after receiving pain medication.  Of note she was seen by GI and [facility] on 5/21/2025 for epigastric pain.  Patient was discharged with plans for outpatient ERCP with Dr. Mcrae on 6/25/2025.   ER labs are notable for sodium 134-> 4.2, potassium 6.8, chloride 109, BUN/creatinine 19/1.61, GFR 35, alk phos 114, ALT 9, AST 16, bili total 0.3, lipase 15, hemoglobin 10.5, WBC 8.5, platelets 187.    5/20/2025 CT abdomen pelvis w IV contrast  IMPRESSION:  1. Changes of advanced chronic/recurrent pancreatitis.  2. Questionable mild acute induration of the pancreatic head and uncinate process. Recommend correlation with serum pancreatic enzymes.  3. Fluid collections in the pancreatic head are decreased in size from the prior study in 2023. No new fluid collection identified.  4. Gallbladder is absent. There is considerable intrahepatic biliary ductal dilatation. The common bile duct is dilated 8 mm and tapers at the pancreatic head rather abruptly. These findings are slightly progressed from the prior CT likely related to combination of post cholecystectomy dilatation and  progressive distal common bile duct stricture by the chronic pancreatic condition.  5. Unchanged left adrenal mass.  6. Left kidney is diminutive and demonstrates delayed enhancement compared to the right without associated obstruction. This is new from the prior CT. Etiology is unclear.    2/13/2023 ERCP  with Dr. Mcrae for abdominal pain of suspected pancreatic origin, therapy of chronic pancreatitis and pancreatic duct stone  - The major papilla appeared edematous  - A single segmental biliary stricture was found in the lower third of the main bile duct. The stricture was benign appearing due to extrinsic compression due to chronic calcific pancreatitis.  - A pancreatic duct stricture was found at the level of the ampulla due to severe chronic pancreatitis.  - A pancreatic  was performed in hope of gaining access to the pancreatic duct in the future.    GI service was consulted for abdominal pain and possible ERCP.     Past Medical History  She has a past medical history of Age-related nuclear cataract of both eyes, Irregular astigmatism, bilateral, and Unspecified disorder of refraction.    Surgical History  She has a past surgical history that includes Breast lumpectomy (05/24/2013); US guided abscess fluid collection drainage (06/06/2022); US guided abscess fluid collection drainage (06/06/2022); CT guided percutaneous biopsy mediastinum (06/24/2022); and Colonoscopy (2017).     Social History  She reports that she has been smoking cigarettes. She started smoking about 48 years ago. She has a 96.9 pack-year smoking history. She has never been exposed to tobacco smoke. She has never used smokeless tobacco. She reports that she does not currently use alcohol. She reports current drug use. Drug: Marijuana.    Family History  Family History[1]     Allergies  Penicillins    Review of Systems  Review of Systems   Gastrointestinal:  Positive for abdominal pain.   All other systems reviewed and are negative.         Physical Exam  Physical Exam  Vitals reviewed.   Constitutional:       Appearance: She is underweight. She is ill-appearing.   HENT:      Head: Atraumatic.   Cardiovascular:      Rate and Rhythm: Regular rhythm.      Pulses: Normal pulses.      Heart sounds: Normal heart sounds.   Pulmonary:      Effort: Pulmonary effort is normal.      Breath sounds: Normal breath sounds.   Abdominal:      General: Abdomen is flat. Bowel sounds are normal. There is no distension.      Palpations: Abdomen is soft.      Tenderness: There is abdominal tenderness in the epigastric area. There is no guarding or rebound.   Musculoskeletal:         General: Normal range of motion.      Cervical back: Neck supple.   Skin:     General: Skin is warm and dry.   Neurological:      General: No focal deficit present.      Mental Status: She is alert and oriented to person, place, and time.   Psychiatric:         Mood and Affect: Mood normal.         Behavior: Behavior normal.             Last Recorded Vitals  /62 (BP Location: Left arm, Patient Position: Lying)   Pulse 76   Temp 36.5 °C (97.7 °F) (Temporal)   Resp 20   Wt (!) 36.3 kg (80 lb)   SpO2 98%     Relevant Results  Scheduled medications  Scheduled Medications[2]  Continuous medications  Continuous Medications[3]  PRN medications  PRN Medications[4]      Results for orders placed or performed during the hospital encounter of 06/13/25 (from the past 24 hours)   CBC and Auto Differential   Result Value Ref Range    WBC 8.5 4.4 - 11.3 x10*3/uL    nRBC 0.0 0.0 - 0.0 /100 WBCs    RBC 3.30 (L) 4.00 - 5.20 x10*6/uL    Hemoglobin 10.5 (L) 12.0 - 16.0 g/dL    Hematocrit 33.9 (L) 36.0 - 46.0 %     (H) 80 - 100 fL    MCH 31.8 26.0 - 34.0 pg    MCHC 31.0 (L) 32.0 - 36.0 g/dL    RDW 12.5 11.5 - 14.5 %    Platelets 187 150 - 450 x10*3/uL    Neutrophils % 65.3 40.0 - 80.0 %    Immature Granulocytes %, Automated 0.5 0.0 - 0.9 %    Lymphocytes % 26.2 13.0 - 44.0 %    Monocytes % 6.0  2.0 - 10.0 %    Eosinophils % 1.3 0.0 - 6.0 %    Basophils % 0.7 0.0 - 2.0 %    Neutrophils Absolute 5.56 1.20 - 7.70 x10*3/uL    Immature Granulocytes Absolute, Automated 0.04 0.00 - 0.70 x10*3/uL    Lymphocytes Absolute 2.23 1.20 - 4.80 x10*3/uL    Monocytes Absolute 0.51 0.10 - 1.00 x10*3/uL    Eosinophils Absolute 0.11 0.00 - 0.70 x10*3/uL    Basophils Absolute 0.06 0.00 - 0.10 x10*3/uL   Comprehensive metabolic panel   Result Value Ref Range    Glucose 91 74 - 99 mg/dL    Sodium 134 (L) 136 - 145 mmol/L    Potassium 6.8 (HH) 3.5 - 5.3 mmol/L    Chloride 109 (H) 98 - 107 mmol/L    Bicarbonate 21 21 - 32 mmol/L    Anion Gap 11 10 - 20 mmol/L    Urea Nitrogen 19 6 - 23 mg/dL    Creatinine 1.61 (H) 0.50 - 1.05 mg/dL    eGFR 35 (L) >60 mL/min/1.73m*2    Calcium 9.3 8.6 - 10.3 mg/dL    Albumin 4.1 3.4 - 5.0 g/dL    Alkaline Phosphatase 114 33 - 136 U/L    Total Protein 7.5 6.4 - 8.2 g/dL    AST 16 9 - 39 U/L    Bilirubin, Total 0.3 0.0 - 1.2 mg/dL    ALT 9 7 - 45 U/L   Lipase   Result Value Ref Range    Lipase 15 9 - 82 U/L   Troponin I, High Sensitivity   Result Value Ref Range    Troponin I, High Sensitivity 5 0 - 13 ng/L   Phosphorus   Result Value Ref Range    Phosphorus 3.6 2.5 - 4.9 mg/dL   Magnesium   Result Value Ref Range    Magnesium 1.55 (L) 1.60 - 2.40 mg/dL   POCT GLUCOSE   Result Value Ref Range    POCT Glucose 179 (H) 74 - 99 mg/dL   Basic metabolic panel   Result Value Ref Range    Glucose 153 (H) 74 - 99 mg/dL    Sodium 136 136 - 145 mmol/L    Potassium 4.2 3.5 - 5.3 mmol/L    Chloride 107 98 - 107 mmol/L    Bicarbonate 17 (L) 21 - 32 mmol/L    Anion Gap 16 10 - 20 mmol/L    Urea Nitrogen 18 6 - 23 mg/dL    Creatinine 1.46 (H) 0.50 - 1.05 mg/dL    eGFR 39 (L) >60 mL/min/1.73m*2    Calcium 9.9 8.6 - 10.3 mg/dL   Urinalysis with Reflex Culture and Microscopic   Result Value Ref Range    Color, Urine Colorless (N) Light-Yellow, Yellow, Dark-Yellow    Appearance, Urine Clear Clear    Specific Gravity,  Urine 1.005 1.005 - 1.035    pH, Urine 5.0 5.0, 5.5, 6.0, 6.5, 7.0, 7.5, 8.0    Protein, Urine NEGATIVE NEGATIVE, 10 (TRACE), 20 (TRACE) mg/dL    Glucose, Urine Normal Normal mg/dL    Blood, Urine NEGATIVE NEGATIVE mg/dL    Ketones, Urine NEGATIVE NEGATIVE mg/dL    Bilirubin, Urine NEGATIVE NEGATIVE mg/dL    Urobilinogen, Urine Normal Normal mg/dL    Nitrite, Urine NEGATIVE NEGATIVE    Leukocyte Esterase, Urine NEGATIVE NEGATIVE    ECG 12 lead  Result Date: 5/20/2025  Normal sinus rhythm Right atrial enlargement When compared with ECG of 26-APR-2023 21:56, Left posterior fascicular block is no longer Present Confirmed by Fritz Lopez ([id number]) on 5/20/2025 11:00:05 PM    CT abdomen pelvis w IV contrast  Result Date: 5/20/2025  Interpreted By:  Miller Lugo, STUDY: CT ABDOMEN PELVIS W IV CONTRAST;  5/20/2025 9:36 am   INDICATION: Signs/Symptoms:Epigastric pain with history of pancreatitis     COMPARISON: CT abdomen and pelvis 05/26/2023.   ACCESSION NUMBER(S): [id number]   ORDERING CLINICIAN: ELIEL HARRIS   TECHNIQUE: CT of the abdomen and pelvis was performed. Contiguous axial images were obtained at  3 mm through the abdomen and pelvis. Coronal and sagittal reconstructions at  3 mm slice thickness were performed.   FINDINGS: LUNG BASES: No effusion, suspicious mass or consolidation.   ABDOMEN: No free air. Shrunken pancreas with extensive parenchymal calcifications and beaded appearance of the main pancreatic duct. Small low-density collections along the pancreatic head, the more medial measuring 13 x 11 mm greatest axial dimensions in the more right lateral measuring 9 x 8 mm greatest axial dimensions. These previously measured 23 x 19 mm and 13 x 10 mm respectively. There is a heterogeneous possibly indurated appearance of the pancreatic head and uncinate process. No evidence of newly developing pseudocyst.   Gallbladder is absent. There is considerable intrahepatic biliary ductal dilatation. The common  bile duct is dilated 8 mm and tapers at the pancreatic head rather abruptly. These findings are slightly progressed from the prior CT likely related to combination of post cholecystectomy dilatation and progressive distal common bile duct stricture by the chronic pancreatic condition.     Stomach unremarkable. Spleen normal size without mass. Heterogeneous left adrenal mass with calcifications measures 31 x 19 mm greatest axial dimensions and is unchanged, previously 30 x 20 mm.   Small and large bowel are normal in caliber and wall thickness without evidence for obstruction, mass, or inflammation. Appendix appears normal.   No suspicious renal mass. No hydronephrosis. No kidney stones. Left kidney is diminutive and demonstrates delayed enhancement compared to the right without associated obstruction. This is new from the prior CT. No ureteral stones.   PELVIS: No significant  pelvic free fluid. Uterus unremarkable. No evident bladder mass or bladder stone. No distal ureteral dilatation or stone.     Intrapelvic bowel normal in caliber and wall thickness.     BONES AND VESSELS: No destructive osseous lesions or acute osseous abnormalities. Again demonstrated is bilateral L5 spondylolysis with associated grade 1 anterolisthesis L5 on S1, severe degenerative disc disease at that level and moderately severe bilateral foraminal stenosis at that level. Moderately advanced aortoiliac atherosclerosis. No aneurysms.       1.  Changes of advanced chronic/recurrent pancreatitis. 2. Questionable mild acute induration of the pancreatic head and uncinate process. Recommend correlation with serum pancreatic enzymes. 3. Fluid collections in the pancreatic head are decreased in size from the prior study in 2023. No new fluid collection identified. 4. Gallbladder is absent. There is considerable intrahepatic biliary ductal dilatation. The common bile duct is dilated 8 mm and tapers at the pancreatic head rather abruptly. These  findings are slightly progressed from the prior CT likely related to combination of post cholecystectomy dilatation and progressive distal common bile duct stricture by the chronic pancreatic condition. 5. Unchanged left adrenal mass. 6. Left kidney is diminutive and demonstrates delayed enhancement compared to the right without associated obstruction. This is new from the prior CT. Etiology is unclear.     MACRO: None   Signed by: Miller Lugo 5/20/2025 10:03 AM Dictation workstation:   [id number]     Assessment/Plan   Jodi Johnson is a 69 y.o. female with a PMHx s/f GERD, anxiety, depression,  chronic pancreatitis related to EtOH s/p [place]-Naida lateral pancreaticojejunostomy, CBD stricture, pancreatic duct stricture s/p multiple ERCPs (last ERCP in 2/2023 with Dr. Mcrae s/p pancreatic sphincterotomy) who presented to [facility] ER on 6/13/2025 with worsening abdominal pain.  GI service was consulted for abdominal pain and possible ERCP.  Patient presenting with chronic abdominal pain in the setting of chronic pancreatitis. LFTs and lipase normal.  Suspect that her abdominal pain is related to chronic pancreatitis.     - Diet as tolerated  - Case discussed with advanced GI Dr. Mcrae.  No plans for inpatient ERCP. Outpatient ERCP with Dr. Mcrae on 6/25/2025 for pancreatic duct stenting  - Supportive care as per primary team  - No barriers for discharge from GI standpoint  - GI will sign off.  Please reach out if any questions or further assistance needed    Case discussed with Dr. Milagros Julio, APRN-CNP             [1]   Family History  Problem Relation Name Age of Onset    Heart disease Mother  78    Hypertension Mother      Heart disease Father      Other (pacemaker) Father      Coronary artery disease Brother  55    Other (stent pacement) Brother     [2] colestipol, 1 g, oral, BID  lipase-protease-amylase, 1 capsule, oral, TID  lisinopril, 20 mg, oral, Daily  magnesium oxide, 400 mg of  magnesium oxide, oral, BID  mirtazapine, 15 mg, oral, Nightly  pantoprazole, 40 mg, oral, Daily before breakfast  sodium zirconium cyclosilicate, 10 g, oral, q8h  sucralfate, 1 g, oral, BID  traZODone, 100 mg, oral, Nightly  [3]    [4] PRN medications: hydrOXYzine HCL, morphine, ondansetron, ondansetron ODT

## 2025-06-14 NOTE — CARE PLAN
The patient's goals for the shift include to get rest    The clinical goals for the shift include to reduce pain and promote safe environment    Over the shift, the patient did not make progress toward the following goals. Barriers to progression include education. Recommendations to address these barriers include reinforcement.

## 2025-06-14 NOTE — H&P (VIEW-ONLY)
HISTORY AND PHYSICAL EXAMINATION    PATIENT NAME: Jodi Johnson    MRN: [id number]  SERVICE DATE: 6/14/2025       PRIMARY CARE PHYSICIAN: Albert Corea MD          ASSESSMENT AND PLAN     Acute on chronic epig abdo pain.  Hyperkalemia on admission, currently better.  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 Awaiting ERCP      PLAN:   Regular diet with diet supplements, dietitian consult.  GI consult noted.  Planning ERCP is planned on 6/25/2025.  Counseled on abstaining from alcohol abuse.  Continue.  Pancreatic supplements.  Stop potassium supplementation.  HOLD lisinopril for WESLEY.  Can stop additional dose of Lokelma.  Continue Remeron.  Continue Zyprexa for bipolar.  DVT prophylaxis      Discussed with nurses/case management team and the specialists involved in this patient's care. Reviewed the EMR and documentation from other care-givers.    SUBJECTIVE  CHIEF COMPLAINT:  abdo pain and poor appetite    HPI: This is a 69 y.o. female who came to ER for increasing abdominal pain and ongoing poor appetite.  Patient has had prior GI workup and has seen Dr. Stern in the past.  She is scheduled to have ERCP on 6/25/2025.  She could not bear the pain any longer and thus came to the ER for further help.    She has had no additional GI symptoms like emesis or diarrhea.  Last BM was 2 days ago.    Workup in the ER showed hyperkalemia, creatinine slightly higher than baseline.  Patient was given Lokelma with improvement in potassium levels.  Patient admitted for further GI evaluation/management.      PAST MEDICAL HISTORY: Medical History[1]  PAST SURGICAL HISTORY: Surgical History[2]  FAMILY HISTORY: Family History[3]  SOCIAL HISTORY: Social History[4]    MEDICATIONS: Prior to Admission Medications  Prescriptions Prior to Admission[5]   CURRENT ALLERGIES: RX Allergies[6]    COMPLETE REVIEW OF SYSTEMS:       GENERAL: No fever, appetite stable.  HEENT: No epistaxis, no mouth ulcers  NECK: no neck pain  RESPIRATORY: No new resp symptoms.  CARDIOVASCULAR: No cp, no leg edema, No orthopnea  GI: see HPI. No GI bleed.  : No hematuria, no dysuria  MUSCULOSKELETAL: No new jt pains or swelling  SKIN: No rashes, no ulcers  PSYCH: +hx bipolar disorder  HEMATOLOGY/LYMPHOLOGY: No hx of VTE  ENDOCRINE: No hx of DM  NEURO: No hx of seizures, No hx of CVA      OBJECTIVE  PHYSICAL EXAM:       6/14/2025     1:15 AM 6/14/2025     1:30 AM 6/14/2025     3:30 AM 6/14/2025     4:12 AM 6/14/2025     7:19 AM 6/14/2025     8:27 AM 6/14/2025    11:51 AM   Vitals   Systolic 144 117 123  127 124 108   Diastolic 59 59 61  61 62 62   BP Location     Left arm Left arm Right arm   Heart Rate 86 77   78 76 75   Temp   36.6 °C (97.9 °F)  36.7 °C (98.1 °F) 36.5 °C (97.7 °F) 36.6 °C (97.9 °F)   Resp 18 13   20     Height    1.524 m (5')      Weight (lb)    80      BMI    15.62 kg/m2      BSA (m2)    1.24 m2        Body mass index is 15.62 kg/m².    GENERAL: awake, alert, Ox3, cooperative resting comfortably. thin  SKIN: Skin turgor normal. No rashes  HEENT: EOMI, no epistaxis, Moist mucosa.  LUNGS: Bilat breath sounds, with no added sounds  CARDIAC: REGULAR. no rubs, no murmur  ABDOMEN: soft, mild epig tender. +BS.  EXTREMITIES: No edema, Good capillary refill.   NEURO: Insight GOOD. No invol movements  MUSCULOSKELETAL: No acute inflammation       .Current Medications[7]    DATA:   Diagnostic tests reviewed for today's visit:    Most recent labs  Results from last 7 days   Lab Units 06/13/25  1629   WBC AUTO x10*3/uL 8.5   HEMOGLOBIN g/dL 10.5*   HEMATOCRIT % 33.9*   PLATELETS AUTO x10*3/uL 187     Results from last 7 days   Lab Units 06/13/25  1950 06/13/25  1629   SODIUM mmol/L 136 134*   POTASSIUM mmol/L 4.2 6.8*   CHLORIDE mmol/L 107 109*   CO2 mmol/L 17* 21   BUN mg/dL 18 19   CREATININE mg/dL 1.46* 1.61*   CALCIUM mg/dL 9.9 9.3   PROTEIN TOTAL  g/dL  --  7.5   BILIRUBIN TOTAL mg/dL  --  0.3   ALK PHOS U/L  --  114   ALT U/L  --  9   AST U/L  --  16   GLUCOSE mg/dL 153* 91       Results from last 7 days   Lab Units 06/13/25  1940   POCT GLUCOSE mg/dL 179*        EKG: SR. QTC normal.      SIGNATURE: Garry Hinojosa MD  DATE: June 14, 2025  TIME: 1:23 PM         [1]   Past Medical History:  Diagnosis Date    Age-related nuclear cataract of both eyes     Irregular astigmatism, bilateral     Unspecified disorder of refraction    [2]   Past Surgical History:  Procedure Laterality Date    BREAST LUMPECTOMY  05/24/2013    Breast Surgery Lumpectomy    COLONOSCOPY  2017    normal. Due in 2027    CT GUIDED PERCUTANEOUS BIOPSY MEDIASTINUM  06/24/2022    CT GUIDED PERCUTANEOUS BIOPSY MEDIASTINUM 6/24/2022 [facility] CLINICAL LEGACY    US GUIDED ABSCESS FLUID COLLECTION DRAINAGE  06/06/2022    US GUIDED ABSCESS FLUID COLLECTION DRAINAGE 6/6/2022 [facility] CLINICAL LEGACY    US GUIDED ABSCESS FLUID COLLECTION DRAINAGE  06/06/2022    US GUIDED ABSCESS FLUID COLLECTION DRAINAGE 6/6/2022 [facility] CLINICAL LEGACY   [3]   Family History  Problem Relation Name Age of Onset    Heart disease Mother  78    Hypertension Mother      Heart disease Father      Other (pacemaker) Father      Coronary artery disease Brother  55    Other (stent pacement) Brother     [4]   Social History  Tobacco Use    Smoking status: Every Day     Current packs/day: 2.00     Average packs/day: 2.0 packs/day for 48.4 years (96.9 ttl pk-yrs)     Types: Cigarettes     Start date: 1977     Passive exposure: Never    Smokeless tobacco: Never   Vaping Use    Vaping status: Never Used   Substance Use Topics    Alcohol use: Not Currently    Drug use: Yes     Types: Marijuana   [5]   Medications Prior to Admission   Medication Sig Dispense Refill Last Dose/Taking    calcium carbonate-vitamin D3 600 mg-20 mcg (800 unit) tablet Take 1 tablet by mouth once daily.   Unknown    calcium citrate-vitamin D3 (Citracal+D) 315 mg-5  mcg (200 unit) tablet     Unknown    colestipol (Colestid) 1 gram tablet Take 1 tablet (1 g) by mouth 2 times a day. 180 tablet 3 Unknown    cyclobenzaprine (Flexeril) 10 mg tablet Take 1 tablet (10 mg) by mouth 2 times a day as needed for muscle spasms for up to 10 days. 10 tablet 0     diclofenac sodium 1 % kit 2 grams Transdermal every 4 hours for 30 day(s)   Unknown    hydrOXYzine HCL (Atarax) 25 mg tablet Take 1 tablet (25 mg) by mouth every 8 hours if needed for itching.   Unknown    lipase-protease-amylase (Creon) 24,000-76,000 -120,000 unit capsule TAKE 1 CAPSULE BY MOUTH THREE TIMES DAILY WITH MEALS 270 capsule 0 Unknown    lisinopril 20 mg tablet Take 1 tablet (20 mg) by mouth once daily.   Unknown    magnesium oxide (Mag-Ox) 400 mg (241.3 mg magnesium) tablet TAKE ONE TABLET BY MOUTH TWICE DAILY 180 tablet 0 Unknown    melatonin 3 mg capsule Take 1 capsule by mouth once daily at bedtime.   Unknown    mirtazapine (Remeron) 15 mg tablet Take 1 tablet (15 mg) by mouth once daily at bedtime.       multivit-minerals/folic acid (ADULT ONE DAILY MULTIVITAMIN ORAL) 1 tablet Orally Once a day   Unknown    naproxen (Naprosyn) 500 mg tablet Take 1 tablet (500 mg) by mouth 2 times a day as needed for mild pain (1 - 3). 60 tablet 5     ondansetron ODT (Zofran-ODT) 4 mg disintegrating tablet Take 1 tablet (4 mg) by mouth every 8 hours if needed for nausea. 90 tablet 0 Unknown    pancrelipase, Lip-Prot-Amyl, (Creon) 12,000-38,000 -60,000 unit capsule     Unknown    pantoprazole (ProtoNix) 40 mg EC tablet TAKE ONE TABLET BY MOUTH ONCE DAILY 30 tablet 0 Unknown    potassium chloride CR 20 mEq ER tablet TAKE ONE TABLET BY MOUTH ONCE A DAY 90 tablet 0 Unknown    QUEtiapine (SEROquel) 25 mg tablet Take 0.5 tablets (12.5 mg) by mouth once daily at bedtime.   Unknown    sucralfate (Carafate) 1 gram tablet Take 1 tablet (1 g) by mouth 2 times a day. Lunch and bedtime   Unknown    traMADol (Ultram) 50 mg tablet Take 1 tablet (50  mg) by mouth every 12 hours if needed for moderate pain (4 - 6). 10 tablet 0 Unknown    traZODone (Desyrel) 100 mg tablet Take 1 tablet (100 mg) by mouth once daily at bedtime.   Unknown   [6]   Allergies  Allergen Reactions    Penicillins Unknown   [7]   Current Facility-Administered Medications:     colestipol (Colestid) tablet 1 g, 1 g, oral, BID, Albert Corea MD, 1 g at 06/14/25 0949    hydrOXYzine HCL (Atarax) tablet 25 mg, 25 mg, oral, q8h PRN, Albert Corea MD    lipase-protease-amylase (Creon) 24,000-76,000 -120,000 unit per capsule 1 capsule, 1 capsule, oral, TID, Albert Corea MD, 1 capsule at 06/14/25 1112    lisinopril tablet 20 mg, 20 mg, oral, Daily, Albert Corea MD, 20 mg at 06/14/25 0827    magnesium oxide (Mag-Ox) 400 mg (241.3 mg elemental) tablet 1 tablet, 400 mg of magnesium oxide, oral, BID, Albert Corea MD, 1 tablet at 06/14/25 0826    mirtazapine (Remeron) tablet 15 mg, 15 mg, oral, Nightly, Albert Corea MD    morphine injection 2 mg, 2 mg, intravenous, q4h PRN, Albert Corea MD, 2 mg at 06/14/25 0827    ondansetron (Zofran) injection 4 mg, 4 mg, intravenous, q4h PRN, Albert Corea MD    ondansetron ODT (Zofran-ODT) disintegrating tablet 4 mg, 4 mg, oral, q8h PRN, Albert Corea MD    pantoprazole (ProtoNix) EC tablet 40 mg, 40 mg, oral, Daily before breakfast, Albert Corea MD, 40 mg at 06/14/25 0827    sodium zirconium cyclosilicate (Lokelma) packet 10 g, 10 g, oral, q8h, Andria Low DO, 10 g at 06/14/25 0829    sucralfate (Carafate) tablet 1 g, 1 g, oral, BID, Albert Corea MD, 1 g at 06/14/25 1112    traZODone (Desyrel) tablet 100 mg, 100 mg, oral, Nightly, Albert Corea MD

## 2025-06-15 VITALS
OXYGEN SATURATION: 96 % | BODY MASS INDEX: 15.71 KG/M2 | RESPIRATION RATE: 14 BRPM | WEIGHT: 80 LBS | HEIGHT: 60 IN | SYSTOLIC BLOOD PRESSURE: 145 MMHG | TEMPERATURE: 97.1 F | HEART RATE: 65 BPM | DIASTOLIC BLOOD PRESSURE: 60 MMHG

## 2025-06-15 LAB
ALBUMIN SERPL BCP-MCNC: 3.1 G/DL (ref 3.4–5)
ALP SERPL-CCNC: 102 U/L (ref 33–136)
ALT SERPL W P-5'-P-CCNC: 12 U/L (ref 7–45)
ANION GAP SERPL CALC-SCNC: 15 MMOL/L (ref 10–20)
AST SERPL W P-5'-P-CCNC: 25 U/L (ref 9–39)
BILIRUB SERPL-MCNC: 0.3 MG/DL (ref 0–1.2)
BUN SERPL-MCNC: 17 MG/DL (ref 6–23)
CALCIUM SERPL-MCNC: 7.9 MG/DL (ref 8.6–10.3)
CHLORIDE SERPL-SCNC: 105 MMOL/L (ref 98–107)
CO2 SERPL-SCNC: 22 MMOL/L (ref 21–32)
CREAT SERPL-MCNC: 1.54 MG/DL (ref 0.5–1.05)
EGFRCR SERPLBLD CKD-EPI 2021: 37 ML/MIN/1.73M*2
ERYTHROCYTE [DISTWIDTH] IN BLOOD BY AUTOMATED COUNT: 12.4 % (ref 11.5–14.5)
GLUCOSE SERPL-MCNC: 79 MG/DL (ref 74–99)
HCT VFR BLD AUTO: 29.2 % (ref 36–46)
HGB BLD-MCNC: 9.5 G/DL (ref 12–16)
MAGNESIUM SERPL-MCNC: 1.86 MG/DL (ref 1.6–2.4)
MCH RBC QN AUTO: 31.8 PG (ref 26–34)
MCHC RBC AUTO-ENTMCNC: 32.5 G/DL (ref 32–36)
MCV RBC AUTO: 98 FL (ref 80–100)
NRBC BLD-RTO: 0 /100 WBCS (ref 0–0)
PHOSPHATE SERPL-MCNC: 4.3 MG/DL (ref 2.5–4.9)
PLATELET # BLD AUTO: 154 X10*3/UL (ref 150–450)
POTASSIUM SERPL-SCNC: 4.5 MMOL/L (ref 3.5–5.3)
PROT SERPL-MCNC: 5.4 G/DL (ref 6.4–8.2)
RBC # BLD AUTO: 2.99 X10*6/UL (ref 4–5.2)
SODIUM SERPL-SCNC: 137 MMOL/L (ref 136–145)
WBC # BLD AUTO: 6.2 X10*3/UL (ref 4.4–11.3)

## 2025-06-15 PROCEDURE — 1200000002 HC GENERAL ROOM WITH TELEMETRY DAILY

## 2025-06-15 PROCEDURE — 36415 COLL VENOUS BLD VENIPUNCTURE: CPT | Performed by: INTERNAL MEDICINE

## 2025-06-15 PROCEDURE — 83735 ASSAY OF MAGNESIUM: CPT | Performed by: INTERNAL MEDICINE

## 2025-06-15 PROCEDURE — 2500000001 HC RX 250 WO HCPCS SELF ADMINISTERED DRUGS (ALT 637 FOR MEDICARE OP): Performed by: INTERNAL MEDICINE

## 2025-06-15 PROCEDURE — 84100 ASSAY OF PHOSPHORUS: CPT | Performed by: INTERNAL MEDICINE

## 2025-06-15 PROCEDURE — 85027 COMPLETE CBC AUTOMATED: CPT | Performed by: INTERNAL MEDICINE

## 2025-06-15 PROCEDURE — 84075 ASSAY ALKALINE PHOSPHATASE: CPT | Performed by: INTERNAL MEDICINE

## 2025-06-15 PROCEDURE — 99232 SBSQ HOSP IP/OBS MODERATE 35: CPT | Performed by: INTERNAL MEDICINE

## 2025-06-15 PROCEDURE — 2500000004 HC RX 250 GENERAL PHARMACY W/ HCPCS (ALT 636 FOR OP/ED): Performed by: INTERNAL MEDICINE

## 2025-06-15 RX ORDER — SODIUM CHLORIDE 9 MG/ML
50 INJECTION, SOLUTION INTRAVENOUS CONTINUOUS
Status: ACTIVE | OUTPATIENT
Start: 2025-06-15 | End: 2025-06-16

## 2025-06-15 RX ADMIN — PANTOPRAZOLE SODIUM 40 MG: 40 TABLET, DELAYED RELEASE ORAL at 06:09

## 2025-06-15 RX ADMIN — MORPHINE SULFATE 2 MG: 2 INJECTION, SOLUTION INTRAMUSCULAR; INTRAVENOUS at 00:54

## 2025-06-15 RX ADMIN — SODIUM CHLORIDE 83 ML/HR: 0.9 INJECTION, SOLUTION INTRAVENOUS at 09:26

## 2025-06-15 RX ADMIN — MORPHINE SULFATE 2 MG: 2 INJECTION, SOLUTION INTRAMUSCULAR; INTRAVENOUS at 06:11

## 2025-06-15 RX ADMIN — TRAZODONE HYDROCHLORIDE 100 MG: 50 TABLET ORAL at 20:40

## 2025-06-15 RX ADMIN — COLESTIPOL HYDROCHLORIDE 1 G: 1 TABLET, FILM COATED ORAL at 20:40

## 2025-06-15 RX ADMIN — Medication 1 TABLET: at 20:40

## 2025-06-15 RX ADMIN — MORPHINE SULFATE 2 MG: 2 INJECTION, SOLUTION INTRAMUSCULAR; INTRAVENOUS at 22:04

## 2025-06-15 RX ADMIN — Medication 1 TABLET: at 09:24

## 2025-06-15 RX ADMIN — COLESTIPOL HYDROCHLORIDE 1 G: 1 TABLET, FILM COATED ORAL at 10:24

## 2025-06-15 RX ADMIN — PANCRELIPASE 1 CAPSULE: 120000; 24000; 76000 CAPSULE, DELAYED RELEASE PELLETS ORAL at 17:25

## 2025-06-15 RX ADMIN — SODIUM CHLORIDE 50 ML/HR: 0.9 INJECTION, SOLUTION INTRAVENOUS at 15:32

## 2025-06-15 RX ADMIN — ONDANSETRON 4 MG: 4 TABLET, ORALLY DISINTEGRATING ORAL at 02:54

## 2025-06-15 RX ADMIN — SUCRALFATE 1 G: 1 TABLET ORAL at 10:24

## 2025-06-15 RX ADMIN — SUCRALFATE 1 G: 1 TABLET ORAL at 20:40

## 2025-06-15 RX ADMIN — MORPHINE SULFATE 2 MG: 2 INJECTION, SOLUTION INTRAMUSCULAR; INTRAVENOUS at 15:32

## 2025-06-15 RX ADMIN — PANCRELIPASE 1 CAPSULE: 120000; 24000; 76000 CAPSULE, DELAYED RELEASE PELLETS ORAL at 09:24

## 2025-06-15 RX ADMIN — ONDANSETRON 4 MG: 2 INJECTION, SOLUTION INTRAMUSCULAR; INTRAVENOUS at 12:39

## 2025-06-15 RX ADMIN — ONDANSETRON 4 MG: 4 TABLET, ORALLY DISINTEGRATING ORAL at 09:24

## 2025-06-15 RX ADMIN — MIRTAZAPINE 15 MG: 15 TABLET, FILM COATED ORAL at 20:40

## 2025-06-15 ASSESSMENT — COGNITIVE AND FUNCTIONAL STATUS - GENERAL
CLIMB 3 TO 5 STEPS WITH RAILING: A LITTLE
DAILY ACTIVITIY SCORE: 24
WALKING IN HOSPITAL ROOM: A LITTLE
DAILY ACTIVITIY SCORE: 24
WALKING IN HOSPITAL ROOM: A LITTLE
MOBILITY SCORE: 22
CLIMB 3 TO 5 STEPS WITH RAILING: A LITTLE
MOBILITY SCORE: 22

## 2025-06-15 ASSESSMENT — PAIN DESCRIPTION - DESCRIPTORS
DESCRIPTORS: ACHING
DESCRIPTORS: ACHING

## 2025-06-15 ASSESSMENT — PAIN - FUNCTIONAL ASSESSMENT
PAIN_FUNCTIONAL_ASSESSMENT: 0-10

## 2025-06-15 ASSESSMENT — PAIN SCALES - GENERAL
PAINLEVEL_OUTOF10: 8
PAINLEVEL_OUTOF10: 7
PAINLEVEL_OUTOF10: 8
PAINLEVEL_OUTOF10: 3
PAINLEVEL_OUTOF10: 2
PAINLEVEL_OUTOF10: 7
PAINLEVEL_OUTOF10: 5 - MODERATE PAIN

## 2025-06-15 ASSESSMENT — PAIN DESCRIPTION - LOCATION
LOCATION: ABDOMEN

## 2025-06-15 NOTE — PROGRESS NOTES
PROGRESS NOTE - INTERNAL MEDICINE     PATIENT NAME:  Jodi Johnson    MRN:  [id number]  SERVICE DATE:  6/15/2025       ADMITTING PHYSICIAN:  Albert Corea MD    ASSESSMENT AND PLAN    Acute on chronic epig abdo pain.  Recurrent NV  Hyperkalemia on admission, currently better.  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 Awaiting ERCP        PLAN:   Regular diet with diet supplements, dietitian consult.  GI consult noted.  ERCP as planned on 6/25/2025.  Counseled on abstaining from alcohol abuse.  Continue.  Pancreatic supplements.  Stop potassium supplementation.  HOLD lisinopril for WESLEY.  Continue Remeron.  Continue Zyprexa for bipolar.  DVT prophylaxis  Notified GI services via chat message about pt's ongoing GI symptoms.    DISPOSITION PLAN:  Pending GI plans.    INTERVAL HISTORY OF PRESENT ILLNESS:  +abdo pain. Low po intake. Nausea this AM and vomiting subseq. No chest pain/sob.  Feeling better. No fever. acute events from last 24 hrs reviewed.    Pertinent ROS:  No abdominal pain / No Bleeding / No rashes    Discussed with nursing and case management team and the specialists involved in this patient's care. Reviewed the EMR and documentation from other care-givers.      OBJECTIVE  PHYSICAL EXAM:     GENERAL: AAOx3, cooperative resting comfortably. thin  SKIN: Skin turgor normal. No rashes  HEENT: EOMI, no epistaxis, Moist mucosa.  LUNGS: Bilat breath sounds, with no added sounds  CARDIAC: REGULAR. no rubs, no murmur  ABDOMEN: soft, mild epig tender. +BS.  EXTREMITIES: No edema, Good capillary refill.   NEURO: [facility] GOOD. No invol movements  MUSCULOSKELETAL: No acute inflammation            6/14/2025    11:51 AM 6/14/2025     3:58 PM 6/14/2025     8:04 PM 6/14/2025    11:50 PM 6/15/2025    12:10 AM 6/15/2025     4:34 AM 6/15/2025     8:48 AM   Vitals   Systolic 108 116 121 123 132 131 144    Diastolic 62 46 65 62 65 65 65   BP Location Right arm  Left arm Left arm  Right arm Right arm   Heart Rate 75 68 61 64 70 69 61   Temp 36.6 °C (97.9 °F) 36.6 °C (97.9 °F) 36.6 °C (97.9 °F) 37 °C (98.6 °F) 36.3 °C (97.4 °F) 36.6 °C (97.9 °F) 36.7 °C (98.1 °F)   Resp   16  17 18 18     Body mass index is 15.62 kg/m².    Intake/Output Summary (Last 24 hours) at 6/15/2025 1003  Last data filed at 6/14/2025 1453  Gross per 24 hour   Intake 100 ml   Output --   Net 100 ml         Current Medications[1]    DATA:   Diagnostic tests reviewed for today's visit:    Most recent labs  Results from last 7 days   Lab Units 06/15/25  0524 06/13/25  1629   WBC AUTO x10*3/uL 6.2 8.5   HEMOGLOBIN g/dL 9.5* 10.5*   HEMATOCRIT % 29.2* 33.9*   PLATELETS AUTO x10*3/uL 154 187     Results from last 7 days   Lab Units 06/15/25  0529 06/13/25  1950 06/13/25  1629   SODIUM mmol/L 137 136 134*   POTASSIUM mmol/L 4.5 4.2 6.8*   CHLORIDE mmol/L 105 107 109*   CO2 mmol/L 22 17* 21   BUN mg/dL 17 18 19   CREATININE mg/dL 1.54* 1.46* 1.61*   CALCIUM mg/dL 7.9* 9.9 9.3   PROTEIN TOTAL g/dL 5.4*  --  7.5   BILIRUBIN TOTAL mg/dL 0.3  --  0.3   ALK PHOS U/L 102  --  114   ALT U/L 12  --  9   AST U/L 25  --  16   GLUCOSE mg/dL 79 153* 91             Results from last 7 days   Lab Units 06/13/25  1940   POCT GLUCOSE mg/dL 179*          SIGNATURE: Garry Hinojosa MD PATIENT NAME: Jodi Johnson   DATE: 6/15/2025 MRN: [id number]   TIME: 10:03 AM             [1]   Current Facility-Administered Medications:     colestipol (Colestid) tablet 1 g, 1 g, oral, BID, Albert Corea MD, 1 g at 06/14/25 2313    docusate sodium (Colace) capsule 100 mg, 100 mg, oral, Daily, Garry Hinojosa MD, 100 mg at 06/14/25 1459    hydrOXYzine HCL (Atarax) tablet 25 mg, 25 mg, oral, q8h PRN, Albert Corea MD    lipase-protease-amylase (Creon) 24,000-76,000 -120,000 unit per capsule 1 capsule, 1 capsule, oral, TID, Albert Corea MD, 1 capsule at 06/15/25 9319     [Held by provider] lisinopril tablet 20 mg, 20 mg, oral, Daily, Garry Hinojosa MD, 20 mg at 06/14/25 0827    magnesium oxide (Mag-Ox) 400 mg (241.3 mg elemental) tablet 1 tablet, 400 mg of magnesium oxide, oral, BID, Albert Corea MD, 1 tablet at 06/15/25 0924    mirtazapine (Remeron) tablet 15 mg, 15 mg, oral, Nightly, Albert Corea MD, 15 mg at 06/14/25 2205    morphine injection 2 mg, 2 mg, intravenous, q4h PRN, Albert Corea MD, 2 mg at 06/15/25 0611    ondansetron (Zofran) injection 4 mg, 4 mg, intravenous, q4h PRN, Albert Corea MD, 4 mg at 06/14/25 1738    ondansetron ODT (Zofran-ODT) disintegrating tablet 4 mg, 4 mg, oral, q8h PRN, Albert Corea MD, 4 mg at 06/15/25 0924    pantoprazole (ProtoNix) EC tablet 40 mg, 40 mg, oral, Daily before breakfast, Albert Corea MD, 40 mg at 06/15/25 0609    polyethylene glycol (Glycolax, Miralax) packet 17 g, 17 g, oral, Daily, Garry Hinojosa MD, 17 g at 06/14/25 1459    sodium chloride 0.9% infusion, 83 mL/hr, intravenous, Continuous, Garry Hinojosa MD, Last Rate: 83 mL/hr at 06/15/25 0926, 83 mL/hr at 06/15/25 0926    [Held by provider] sodium zirconium cyclosilicate (Lokelma) packet 10 g, 10 g, oral, q8h, Andria Low DO, 10 g at 06/14/25 0829    sucralfate (Carafate) tablet 1 g, 1 g, oral, BID, Albert Corea MD, 1 g at 06/14/25 1112    traZODone (Desyrel) tablet 100 mg, 100 mg, oral, Nightly, Albert Corea MD, 100 mg at 06/14/25 2193

## 2025-06-15 NOTE — PROGRESS NOTES
Gastroenterology Progress Note    ASSESSMENT and PLAN:     69 y.o. female with a PMHx s/f GERD, anxiety, depression,  chronic pancreatitis related to EtOH s/p [place] lateral pancreaticojejunostomy, CBD stricture, pancreatic duct stricture s/p multiple ERCPs (last ERCP in 2/2023 with Dr. Mcrae s/p pancreatic sphincterotomy) who presented to [facility] ER on 6/13/2025 with worsening abdominal pain.      Pt was seen by GI and [facility] on 5/21/2025 for epigastric pain.  Patient was discharged with plans for outpatient ERCP with Dr. Mcrae on 6/25/2025.   However, pt could not wait until then and hence came back to ER     6/15/2025  No acute events overnight    Most recent CT on 5/2025 reviewed.   Will hold off on repeat scan and s.creat is elevated. May consider if patient's clinical condition alters, and as s.creat improves    - iv LR   - c/w creon  - avoid opioids as able    - may need inpatient ERCP (vs. Outpatient ERCP). Earliest will be on Monday or Tuesday. Will discuss with Dr. Thor Linares MD, MS  6/15/2025      SUBJECTIVE:     No acute events overnight    OBJECTIVE:     Last Recorded Vitals:  /65 (BP Location: Right arm, Patient Position: Lying)   Pulse 61   Temp 36.7 °C (98.1 °F) (Temporal)   Resp 18   Ht 1.524 m (5')   Wt (!) 36.3 kg (80 lb)   SpO2 94%   BMI 15.62 kg/m²   Physical Exam:  Abdomen: soft, mildly tender in epigastrium, no guarding/rigidity    I have personally reviewed the relevant labs, imaging, medications and pathology    Toan Linares MD, MS  6/15/2025

## 2025-06-16 LAB
ALBUMIN SERPL BCP-MCNC: 3.2 G/DL (ref 3.4–5)
ALP SERPL-CCNC: 181 U/L (ref 33–136)
ALT SERPL W P-5'-P-CCNC: 28 U/L (ref 7–45)
ANION GAP SERPL CALC-SCNC: 10 MMOL/L (ref 10–20)
AST SERPL W P-5'-P-CCNC: 47 U/L (ref 9–39)
ATRIAL RATE: 57 BPM
BILIRUB SERPL-MCNC: 0.3 MG/DL (ref 0–1.2)
BUN SERPL-MCNC: 19 MG/DL (ref 6–23)
CALCIUM SERPL-MCNC: 8.4 MG/DL (ref 8.6–10.3)
CHLORIDE SERPL-SCNC: 103 MMOL/L (ref 98–107)
CO2 SERPL-SCNC: 27 MMOL/L (ref 21–32)
CREAT SERPL-MCNC: 1.48 MG/DL (ref 0.5–1.05)
EGFRCR SERPLBLD CKD-EPI 2021: 39 ML/MIN/1.73M*2
ERYTHROCYTE [DISTWIDTH] IN BLOOD BY AUTOMATED COUNT: 11.9 % (ref 11.5–14.5)
GLUCOSE SERPL-MCNC: 91 MG/DL (ref 74–99)
HCT VFR BLD AUTO: 26.2 % (ref 36–46)
HGB BLD-MCNC: 9 G/DL (ref 12–16)
MAGNESIUM SERPL-MCNC: 1.85 MG/DL (ref 1.6–2.4)
MCH RBC QN AUTO: 32.1 PG (ref 26–34)
MCHC RBC AUTO-ENTMCNC: 34.4 G/DL (ref 32–36)
MCV RBC AUTO: 94 FL (ref 80–100)
NRBC BLD-RTO: 0 /100 WBCS (ref 0–0)
P AXIS: 81 DEGREES
P OFFSET: 209 MS
P ONSET: 163 MS
PHOSPHATE SERPL-MCNC: 3.4 MG/DL (ref 2.5–4.9)
PLATELET # BLD AUTO: 149 X10*3/UL (ref 150–450)
POTASSIUM SERPL-SCNC: 4.7 MMOL/L (ref 3.5–5.3)
PR INTERVAL: 124 MS
PROT SERPL-MCNC: 5.7 G/DL (ref 6.4–8.2)
Q ONSET: 225 MS
QRS COUNT: 9 BEATS
QRS DURATION: 86 MS
QT INTERVAL: 372 MS
QTC CALCULATION(BAZETT): 362 MS
QTC FREDERICIA: 365 MS
R AXIS: 81 DEGREES
RBC # BLD AUTO: 2.8 X10*6/UL (ref 4–5.2)
SODIUM SERPL-SCNC: 135 MMOL/L (ref 136–145)
T AXIS: 82 DEGREES
T OFFSET: 411 MS
VENTRICULAR RATE: 57 BPM
WBC # BLD AUTO: 7.2 X10*3/UL (ref 4.4–11.3)

## 2025-06-16 PROCEDURE — 2500000001 HC RX 250 WO HCPCS SELF ADMINISTERED DRUGS (ALT 637 FOR MEDICARE OP): Performed by: INTERNAL MEDICINE

## 2025-06-16 PROCEDURE — 80053 COMPREHEN METABOLIC PANEL: CPT | Performed by: INTERNAL MEDICINE

## 2025-06-16 PROCEDURE — 1200000002 HC GENERAL ROOM WITH TELEMETRY DAILY

## 2025-06-16 PROCEDURE — 85027 COMPLETE CBC AUTOMATED: CPT | Performed by: INTERNAL MEDICINE

## 2025-06-16 PROCEDURE — 2500000004 HC RX 250 GENERAL PHARMACY W/ HCPCS (ALT 636 FOR OP/ED): Performed by: INTERNAL MEDICINE

## 2025-06-16 PROCEDURE — 83735 ASSAY OF MAGNESIUM: CPT | Performed by: INTERNAL MEDICINE

## 2025-06-16 PROCEDURE — 84100 ASSAY OF PHOSPHORUS: CPT | Performed by: INTERNAL MEDICINE

## 2025-06-16 PROCEDURE — 36415 COLL VENOUS BLD VENIPUNCTURE: CPT | Performed by: INTERNAL MEDICINE

## 2025-06-16 RX ORDER — MORPHINE SULFATE 2 MG/ML
0.5 INJECTION, SOLUTION INTRAMUSCULAR; INTRAVENOUS EVERY 4 HOURS PRN
Status: DISCONTINUED | OUTPATIENT
Start: 2025-06-16 | End: 2025-06-18 | Stop reason: HOSPADM

## 2025-06-16 RX ORDER — SODIUM CHLORIDE 9 MG/ML
50 INJECTION, SOLUTION INTRAVENOUS CONTINUOUS
Status: ACTIVE | OUTPATIENT
Start: 2025-06-16 | End: 2025-06-17

## 2025-06-16 RX ADMIN — SUCRALFATE 1 G: 1 TABLET ORAL at 20:56

## 2025-06-16 RX ADMIN — COLESTIPOL HYDROCHLORIDE 1 G: 1 TABLET, FILM COATED ORAL at 20:56

## 2025-06-16 RX ADMIN — TRAZODONE HYDROCHLORIDE 100 MG: 50 TABLET ORAL at 20:56

## 2025-06-16 RX ADMIN — COLESTIPOL HYDROCHLORIDE 1 G: 1 TABLET, FILM COATED ORAL at 11:23

## 2025-06-16 RX ADMIN — MIRTAZAPINE 15 MG: 15 TABLET, FILM COATED ORAL at 20:56

## 2025-06-16 RX ADMIN — Medication 1 TABLET: at 08:02

## 2025-06-16 RX ADMIN — MORPHINE SULFATE 0.5 MG: 2 INJECTION, SOLUTION INTRAMUSCULAR; INTRAVENOUS at 17:02

## 2025-06-16 RX ADMIN — SUCRALFATE 1 G: 1 TABLET ORAL at 11:23

## 2025-06-16 RX ADMIN — PANCRELIPASE 1 CAPSULE: 120000; 24000; 76000 CAPSULE, DELAYED RELEASE PELLETS ORAL at 16:15

## 2025-06-16 RX ADMIN — DOCUSATE SODIUM 100 MG: 100 CAPSULE, LIQUID FILLED ORAL at 08:02

## 2025-06-16 RX ADMIN — PANTOPRAZOLE SODIUM 40 MG: 40 TABLET, DELAYED RELEASE ORAL at 05:52

## 2025-06-16 RX ADMIN — PANCRELIPASE 1 CAPSULE: 120000; 24000; 76000 CAPSULE, DELAYED RELEASE PELLETS ORAL at 08:02

## 2025-06-16 RX ADMIN — SODIUM CHLORIDE 50 ML/HR: 0.9 INJECTION, SOLUTION INTRAVENOUS at 09:42

## 2025-06-16 RX ADMIN — MORPHINE SULFATE 0.5 MG: 2 INJECTION, SOLUTION INTRAMUSCULAR; INTRAVENOUS at 12:34

## 2025-06-16 RX ADMIN — PANCRELIPASE 1 CAPSULE: 120000; 24000; 76000 CAPSULE, DELAYED RELEASE PELLETS ORAL at 11:23

## 2025-06-16 RX ADMIN — MORPHINE SULFATE 2 MG: 2 INJECTION, SOLUTION INTRAMUSCULAR; INTRAVENOUS at 02:30

## 2025-06-16 RX ADMIN — Medication 1 TABLET: at 20:56

## 2025-06-16 ASSESSMENT — COGNITIVE AND FUNCTIONAL STATUS - GENERAL
DAILY ACTIVITIY SCORE: 24
MOBILITY SCORE: 22
CLIMB 3 TO 5 STEPS WITH RAILING: A LITTLE
MOBILITY SCORE: 24
DAILY ACTIVITIY SCORE: 24
WALKING IN HOSPITAL ROOM: A LITTLE

## 2025-06-16 ASSESSMENT — PAIN - FUNCTIONAL ASSESSMENT: PAIN_FUNCTIONAL_ASSESSMENT: 0-10

## 2025-06-16 ASSESSMENT — PAIN SCALES - GENERAL
PAINLEVEL_OUTOF10: 8
PAINLEVEL_OUTOF10: 0 - NO PAIN
PAINLEVEL_OUTOF10: 6

## 2025-06-16 ASSESSMENT — PAIN DESCRIPTION - LOCATION: LOCATION: ABDOMEN

## 2025-06-16 ASSESSMENT — ACTIVITIES OF DAILY LIVING (ADL): LACK_OF_TRANSPORTATION: NO

## 2025-06-16 ASSESSMENT — PAIN DESCRIPTION - DESCRIPTORS: DESCRIPTORS: ACHING

## 2025-06-16 NOTE — PROGRESS NOTES
06/16/25 1507   Discharge Planning   Living Arrangements Alone   Support Systems Children;Family members   Assistance Needed Met with patient at bedside, explained role of TCC, patient admitted for abd pain, was scheduled for outpt ERCP but may need this done inpatient d/t sypmtoms, patient states Dr Haskins is pcp, uses the [facility] in [facility] for meds. independent with all care, does not drive but states friends and family take her to appts etc and will also provide her transport home at discharge. Patient plans to return home no needs when med ready for discharge.   Type of Residence Private residence   Home or Post Acute Services None   Expected Discharge Disposition Home   Does the patient need discharge transport arranged? No   Financial Resource Strain   How hard is it for you to pay for the very basics like food, housing, medical care, and heating? Not hard   Housing Stability   In the last 12 months, was there a time when you were not able to pay the mortgage or rent on time? N   In the past 12 months, how many times have you moved where you were living? 0   At any time in the past 12 months, were you homeless or living in a shelter (including now)? N   Transportation Needs   In the past 12 months, has lack of transportation kept you from medical appointments or from getting medications? no   In the past 12 months, has lack of transportation kept you from meetings, work, or from getting things needed for daily living? No

## 2025-06-16 NOTE — PROGRESS NOTES
6/16/2025 12:24 PM I met with patient. I discussed her alcohol use and [facility] referral. Patient said she stopped drinking 6 years ago and is not interested in a referral to [facility]. [facility] in [place]

## 2025-06-16 NOTE — PROGRESS NOTES
PROGRESS NOTE - INTERNAL MEDICINE     PATIENT NAME:  Jodi Johnson    MRN:  [id number]  SERVICE DATE:  6/16/2025       ADMITTING PHYSICIAN:  Albert Corea MD    ASSESSMENT AND PLAN    HTN  Acute on chronic epig abdo pain.  Recurrent NV  Hyperkalemia on admission, currently better.  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 Awaiting ERCP        PLAN:   Regular diet with diet supplements, dietitian consult.  GI consult noted.  ERCP as planned on 6/25/2025.  Counseled on abstaining from alcohol abuse.  Continue.  Pancreatic supplements.  Stop potassium supplementation.  HOLD lisinopril for WESLEY.  Continue Remeron.  Continue Zyprexa for bipolar.  DVT prophylaxis  Notified GI services via chat message about pt's ongoing GI symptoms.     DISPOSITION PLAN:  Pending GI plans.     INTERVAL HISTORY OF PRESENT ILLNESS:  +abdo pain. Low po intake. Nausea this AM and vomiting subseq. No chest pain/sob.  Feeling better. No fever. acute events from last 24 hrs reviewed.     Pertinent ROS:  No abdominal pain / No Bleeding / No rashes     Discussed with nursing and case management team and the specialists involved in this patient's care. Reviewed the EMR and documentation from other care-givers.        OBJECTIVE  PHYSICAL EXAM:      GENERAL: AAOx3, cooperative resting comfortably. thin  SKIN: Skin turgor normal. No rashes  HEENT: EOMI, no epistaxis, Moist mucosa.  LUNGS: Bilat breath sounds, with no added sounds  CARDIAC: REGULAR. no rubs, no murmur  ABDOMEN: soft, mild epig tender. +BS.  EXTREMITIES: No edema, Good capillary refill.   NEURO: [facility] GOOD. No invol movements  MUSCULOSKELETAL: No acute inflammation                6/15/2025     4:34 AM 6/15/2025     8:48 AM 6/15/2025    12:24 PM 6/15/2025     4:29 PM 6/15/2025     7:32 PM 6/16/2025     4:39 AM 6/16/2025     7:31 AM   Vitals   Systolic 131 144 138 143 145 122  134   Diastolic 65 65 66 65 60 63 64   BP Location Right arm Right arm Right arm   Right arm Right arm   Heart Rate 69 61 68 61 65  57   Temp 36.6 °C (97.9 °F) 36.7 °C (98.1 °F) 36.8 °C (98.2 °F) 36.6 °C (97.9 °F) 36.2 °C (97.1 °F) 37.1 °C (98.7 °F) 36.5 °C (97.7 °F)   Resp 18 18 18 14   15     Body mass index is 15.62 kg/m².  No intake or output data in the 24 hours ending 06/16/25 0813      Current Medications[1]    DATA:   Diagnostic tests reviewed for today's visit:    Most recent labs  Results from last 7 days   Lab Units 06/16/25  0709 06/15/25  0524 06/13/25  1629   WBC AUTO x10*3/uL 7.2 6.2 8.5   HEMOGLOBIN g/dL 9.0* 9.5* 10.5*   HEMATOCRIT % 26.2* 29.2* 33.9*   PLATELETS AUTO x10*3/uL 149* 154 187     Results from last 7 days   Lab Units 06/16/25  0709 06/15/25  0529 06/13/25  1950 06/13/25  1629   SODIUM mmol/L 135* 137 136 134*   POTASSIUM mmol/L 4.7 4.5 4.2 6.8*   CHLORIDE mmol/L 103 105 107 109*   CO2 mmol/L 27 22 17* 21   BUN mg/dL 19 17 18 19   CREATININE mg/dL 1.48* 1.54* 1.46* 1.61*   CALCIUM mg/dL 8.4* 7.9* 9.9 9.3   PROTEIN TOTAL g/dL 5.7* 5.4*  --  7.5   BILIRUBIN TOTAL mg/dL 0.3 0.3  --  0.3   ALK PHOS U/L 181* 102  --  114   ALT U/L 28 12  --  9   AST U/L 47* 25  --  16   GLUCOSE mg/dL 91 79 153* 91             Results from last 7 days   Lab Units 06/13/25  1940   POCT GLUCOSE mg/dL 179*          SIGNATURE: Garry Hinojosa MD PATIENT NAME: Jodi Johnson   DATE: 6/16/2025 MRN: [id number]   TIME: 8:13 AM             [1]   Current Facility-Administered Medications:     colestipol (Colestid) tablet 1 g, 1 g, oral, BID, Albert Corea MD, 1 g at 06/15/25 2040    docusate sodium (Colace) capsule 100 mg, 100 mg, oral, Daily, Garry Hinojosa MD, 100 mg at 06/16/25 0802    hydrOXYzine HCL (Atarax) tablet 25 mg, 25 mg, oral, q8h PRN, Albert Corea MD    lipase-protease-amylase (Creon) 24,000-76,000 -120,000 unit per capsule 1 capsule, 1 capsule, oral, TID, Albert Corea MD, 1 capsule at  06/16/25 0802    [Held by provider] lisinopril tablet 20 mg, 20 mg, oral, Daily, Garry Hinojosa MD, 20 mg at 06/14/25 0827    magnesium oxide (Mag-Ox) 400 mg (241.3 mg elemental) tablet 1 tablet, 400 mg of magnesium oxide, oral, BID, Albert Corea MD, 1 tablet at 06/16/25 0802    mirtazapine (Remeron) tablet 15 mg, 15 mg, oral, Nightly, Albert Corea MD, 15 mg at 06/15/25 2040    morphine injection 2 mg, 2 mg, intravenous, q4h PRN, Albert Corea MD, 2 mg at 06/16/25 0230    ondansetron (Zofran) injection 4 mg, 4 mg, intravenous, q4h PRN, Albert Corea MD, 4 mg at 06/15/25 1239    ondansetron ODT (Zofran-ODT) disintegrating tablet 4 mg, 4 mg, oral, q8h PRN, Albert Corea MD, 4 mg at 06/15/25 0924    pantoprazole (ProtoNix) EC tablet 40 mg, 40 mg, oral, Daily before breakfast, Albert Corea MD, 40 mg at 06/16/25 0552    polyethylene glycol (Glycolax, Miralax) packet 17 g, 17 g, oral, Daily, Garry Hinojosa MD, 17 g at 06/14/25 1459    sodium chloride 0.9% infusion, 50 mL/hr, intravenous, Continuous, Garry Hinojosa MD, Last Rate: 50 mL/hr at 06/15/25 1532, 50 mL/hr at 06/15/25 1532    sucralfate (Carafate) tablet 1 g, 1 g, oral, BID, Albert Corea MD, 1 g at 06/15/25 2040    traZODone (Desyrel) tablet 100 mg, 100 mg, oral, Nightly, Albert Corea MD, 100 mg at 06/15/25 2040

## 2025-06-16 NOTE — CARE PLAN
The patient's goals for the shift include to get rest    The clinical goals for the shift include remain free of injury    Over the shift, the patient did not make progress toward the following goals. Barriers to progression include  Problem: Safety - Adult  Goal: Free from fall injury  Outcome: Progressing     Problem: Pain - Adult  Goal: Verbalizes/displays adequate comfort level or baseline comfort level  Outcome: Progressing

## 2025-06-16 NOTE — CONSULTS
Nutrition Consult Note  Nutrition Assessment      Reason for Assessment: Enteral assessment/recommendation (TF)    Jodi Johnson is a 69 y.o. year old female patient with Hyperkalemia [E87.5]  Chronic pancreatitis, unspecified pancreatitis type (Multi) [K86.1]    referred for TF assess/recs .     Chart reviewed and pt visited.  Medical History[1]    Per chart review:  -Pt admitted c/o abd pain, N/V  -HO chronic pancreatitis, taking Creon; HO SPCM  -ERCP planned for 6/25, may happen during this admission  -Decreased PO intake  -On Remeron    Per pt:  -No food allergies  -No difficulties chewing/swallowing  -Poor intake x5days, prior to 6/11 was able to tolerate solids but had chronic diarrhea  -Attempted soup last night (few bites), vomited   -Bites of eggs this morning, vomited  -Drinking Ensure drinks, tolerating  -Unsure of wt loss, but feels like lost wt    Scheduled medications  Scheduled Medications[2]  Continuous medications  Continuous Medications[3]  PRN medications  PRN Medications[4]    Nutrition Significant Labs:  BMP Trend:   Results from last 7 days   Lab Units 06/16/25  0709 06/15/25  0529 06/13/25  1950 06/13/25  1629   GLUCOSE mg/dL 91 79 153* 91   CALCIUM mg/dL 8.4* 7.9* 9.9 9.3   SODIUM mmol/L 135* 137 136 134*   POTASSIUM mmol/L 4.7 4.5 4.2 6.8*   CO2 mmol/L 27 22 17* 21   CHLORIDE mmol/L 103 105 107 109*   BUN mg/dL 19 17 18 19   CREATININE mg/dL 1.48* 1.54* 1.46* 1.61*    , BG POCT trend:   Results from last 7 days   Lab Units 06/13/25  1940   POCT GLUCOSE mg/dL 179*    , Liver Function Trend:   Results from last 7 days   Lab Units 06/16/25  0709 06/15/25  0529 06/13/25  1629   ALK PHOS U/L 181* 102 114   AST U/L 47* 25 16   ALT U/L 28 12 9   BILIRUBIN TOTAL mg/dL 0.3 0.3 0.3    , Renal Lab Trend:   Results from last 7 days   Lab Units 06/16/25  0709 06/15/25  0529 06/13/25  1950 06/13/25  1629   POTASSIUM mmol/L 4.7 4.5 4.2 6.8*   PHOSPHORUS mg/dL 3.4 4.3  --  3.6   SODIUM mmol/L 135* 137 136  "134*   MAGNESIUM mg/dL 1.85 1.86  --  1.55*   EGFR mL/min/1.73m*2 39* 37* 39* 35*   BUN mg/dL 19 17 18 19   CREATININE mg/dL 1.48* 1.54* 1.46* 1.61*    , TPN/PPN Labs:   Results from last 7 days   Lab Units 06/16/25  0709 06/15/25  0529 06/13/25  1950 06/13/25  1629   GLUCOSE mg/dL 91 79 153* 91   POTASSIUM mmol/L 4.7 4.5 4.2 6.8*   PHOSPHORUS mg/dL 3.4 4.3  --  3.6   MAGNESIUM mg/dL 1.85 1.86  --  1.55*   SODIUM mmol/L 135* 137 136 134*   CHLORIDE mmol/L 103 105 107 109*   ALT U/L 28 12  --  9   AST U/L 47* 25  --  16   ALK PHOS U/L 181* 102  --  114   BILIRUBIN TOTAL mg/dL 0.3 0.3  --  0.3    , Lipid Panel:   Lab Results   Component Value Date    CHOL 159 07/31/2023    HDL 49 (L) 07/31/2023    CHHDL 3.2 07/31/2023    TRIG 119 07/31/2023    , Vit D: No results found for: \"VITD25\" , Vit B12:   Lab Results   Component Value Date    MSCGPERN51 461 07/31/2023    , Iron Panel:   Lab Results   Component Value Date    IRON 84 03/19/2019    TIBC 127 (L) 03/19/2019    FERRITIN 1,197 (H) 03/19/2019    , Folate:   Lab Results   Component Value Date    FOLATE 20.0 (H) 03/14/2019        Dietary Orders (From admission, onward)       Start     Ordered    06/16/25 1143  Oral nutritional supplements  Until discontinued        Comments: 4 Ensure drinks total; 1 at breakfast, 1 at dinner and 2 at lunch   Question Answer Comment   Deliver with All meals 4 Ensure drinks total; 1 at breakfast, 1 at dinner and 2 at lunch   Select supplement: Ensure Plus High Protein        06/16/25 1143    06/14/25 0229  May Participate in Room Service  ( ROOM SERVICE MAY PARTICIPATE)  Once        Question:  .  Answer:  Yes    06/14/25 0228 06/14/25 0008  Adult diet Regular  Diet effective now        Question:  Diet type  Answer:  Regular    06/14/25 0008                  History:  Energy Intake: Fair 50-75 %, Good > 75 %  Food and Nutrient History: Per pt, not tolerating the meal trays (solid foods) but is tolerating the Ensure drinks and drinking " them @100%    Anthropometrics:  Height: 152.4 cm (5')  Weight: (!) 38 kg (83 lb 12.8 oz)  BMI (Calculated): 16.37    Weight Change: 4.75    Wt Readings from Last 3 Encounters:  06/16/25 (!) 38 kg (83 lb 12.8 oz)  05/21/25 (!) 37.4 kg (82 lb 8 oz)  04/08/25 38.2 kg  11/14/24 40.4 kg   06/27/24 39.5 kg    Significant Weight Loss: No       IBW/kg (Dietitian Calculated): 45.5 kg  Percent of IBW: 84 %       Energy Needs:  Height: 152.4 cm (5')  Temp: 36.3 °C (97.4 °F)    Total Energy Estimated Needs in 24 hours (kCal): 1150 kCal  Energy Estimated Needs per kg Body Weight in 24 hours (kCal/kg): 1350 kCal/kg  Method for Estimating Needs: 30-35kcal/kg    Total Protein Estimated Needs in 24 Hours (g): 40 g  Protein Estimated Needs per kg Body Weight in 24 Hours (g/kg): 45 g/kg  Method for Estimating 24 Hour Protein Needs: 1.0-1.2g/kg    Method for Estimating 24 Hour Fluid Needs: 1mL/kcal or MD recommendations       Nutrition Focused Physical Findings:  Orbital Fat Pads: Mild-Moderate (slight dark circles and slight hollowing)  Buccal Fat Pads: Mild-Moderate (flat cheeks, minimal bounce)    Temporalis: Severe (hollowed scooping depression)  Pectoralis (Clavicular Region): Severe (protruding prominent clavicle)  Deltoid/Trapezius: Severe (squared shoulders, acromion process prominent)  Interosseous: Severe (depressed area between thumb and forefinger)    Edema: none       Skin: Negative  Digestive System Findings: Nausea, Vomiting       Nutrition Diagnosis   Malnutrition Diagnosis  Patient has Malnutrition Diagnosis: Yes  Diagnosis Status: New  Malnutrition Diagnosis: Severe malnutrition related to acute disease or injury  Related to: chronic pancreatitis, abd pains  As Evidenced by: Prolonged poor intake prior to hospital admit less than 50% of estimated energy needs for greater than 5 days, mild/moderate fat loss and severe muscle loss.    Nutrition Interventions/Recommendations   Nutrition Prescription: Nutrition  prescription for oral nutrition  Individualized Nutrition Prescription Provided for : Continue with Ensure ONS. Consider an MVI. Smaller/frequent meals. Take creon as prescribed.    Food and/or Nutrient Delivery Interventions  Meals and Snacks: General healthful diet  Goal: Tolerate diet; >75% consumed     Medical Food Supplement: Commercial beverage medical food supplement therapy  Goal: Ensure nutritional supplements provides 350kcals and 20g of protein    Collaboration and Referral of Nutrition Care: Collaboration by nutrition professional with other providers  Coordination of Care with Providers: Nursing    Education Documentation  N/A    Nutrition Monitoring and Evaluation   Food and Nutrient Related History  Estimated Energy Intake: Energy intake greater or equal to 75% of estimated energy needs, Energy intake 50 -75% of estimated energy needs    Fluid Intake: Estimated fluid intake    Intake / Amount of food: Meets > 75% estimated energy needs, Consumes at least 75% or more of meals/snacks/supplements, Consumes at least 50% or more of meals/snacks/supplements    Anthropometrics: Body Composition/Growth/Weight History  Body Weight: Body weight - Maintain stable weight, Body weight - Promote weight restoration    Body Weight Change: Body weight gain - Gradual weight gain    Biochemical Data, Medical Tests and Procedures  Electrolyte and Renal Panel: Other (Comment), Calcium, serum, Calcium, ionized, Sodium, Creatinine, GFR  Criteria: As clinically indicated    Gastrointestinal Profile: Other (Comment), Alkaline phosphatase, Aspartate aminotransferase (AST)  Criteria: As clinically indicated    Glucose/Endocrine Profile: Glucose within normal limits ( mg/dL)  Criteria: As clinically indicated    Nutritional Anemia Profile: Other (Comment)  Criteria: As clinically indicated    Vitamin Profile: Other (Comment)  Criteria: As clinically indicated    Nutrition Focused Physical Findings  Adipose Finding: Loss of  subcutaneous fat    Bones Finding: Other (Comment)    Digestive System Finding: Vomiting, Nausea, Loose stool, Diarrhea, Constipation, Abdominal pain, Fatty stool, Early satiety    Muscle Finding: Muscle atrophy    Time Spent (min): 60 minutes  Last Date of Nutrition Visit: 06/16/25  Nutrition Follow-Up Needed?: Dietitian to reassess per policy  Follow up Comment: KRISTEL Gonzales            [1]   Past Medical History:  Diagnosis Date    Age-related nuclear cataract of both eyes     Irregular astigmatism, bilateral     Unspecified disorder of refraction    [2] colestipol, 1 g, oral, BID  docusate sodium, 100 mg, oral, Daily  lipase-protease-amylase, 1 capsule, oral, TID  [Held by provider] lisinopril, 20 mg, oral, Daily  magnesium oxide, 400 mg of magnesium oxide, oral, BID  mirtazapine, 15 mg, oral, Nightly  pantoprazole, 40 mg, oral, Daily before breakfast  polyethylene glycol, 17 g, oral, Daily  sucralfate, 1 g, oral, BID  traZODone, 100 mg, oral, Nightly     [3] sodium chloride 0.9%, 50 mL/hr, Last Rate: 50 mL/hr (06/16/25 1051)     [4] PRN medications: hydrOXYzine HCL, morphine, ondansetron, ondansetron ODT

## 2025-06-16 NOTE — PROGRESS NOTES
Pharmacy Medication History     Source of Information: [facility]    Additional concerns with the patient's PTA list.     Notified Provider via Haiku : N/A    The following updates were made to the Prior to Admission medication list:     Medications ADDED:   N/A  Medications CHANGED:  N/A  Medications REMOVED:   N/A  Medications NOT TAKING:   N/A    Allergy reviewed : Yes    Meds 2 Beds : No    Outpatient pharmacy confirmed and updated in chart : Yes    Pharmacy name: [facility]    The list below reflectives the updated PTA list. Please review each medication in order reconciliation for additional clarification and justification.    Prior to Admission Medications   Prescriptions Last Dose      QUEtiapine (SEROquel) 25 mg tablet       Sig: Take 0.5 tablets (12.5 mg) by mouth once daily at bedtime.   calcium carbonate-vitamin D3 600 mg-20 mcg (800 unit) tablet       Sig: Take 1 tablet by mouth once daily.   calcium citrate-vitamin D3 (Citracal+D) 315 mg-5 mcg (200 unit) tablet       Sig:     colestipol (Colestid) 1 gram tablet       Sig: Take 1 tablet (1 g) by mouth 2 times a day.             diclofenac sodium 1 % kit       Si grams Transdermal every 4 hours for 30 day(s)   hydrOXYzine HCL (Atarax) 25 mg tablet       Sig: Take 1 tablet (25 mg) by mouth every 8 hours if needed for itching.   lipase-protease-amylase (Creon) 24,000-76,000 -120,000 unit capsule       Sig: TAKE 1 CAPSULE BY MOUTH THREE TIMES DAILY WITH MEALS   lisinopril 20 mg tablet       Sig: Take 1 tablet (20 mg) by mouth once daily.   magnesium oxide (Mag-Ox) 400 mg (241.3 mg magnesium) tablet       Sig: TAKE ONE TABLET BY MOUTH TWICE DAILY   melatonin 3 mg capsule       Sig: Take 1 capsule by mouth once daily at bedtime.   mirtazapine (Remeron) 15 mg tablet       Sig: Take 1 tablet (15 mg) by mouth once daily at bedtime.   multivit-minerals/folic acid (ADULT ONE DAILY MULTIVITAMIN ORAL)       Si tablet Orally  Once a day   naproxen (Naprosyn) 500 mg tablet       Sig: Take 1 tablet (500 mg) by mouth 2 times a day as needed for mild pain (1 - 3).   ondansetron ODT (Zofran-ODT) 4 mg disintegrating tablet       Sig: Take 1 tablet (4 mg) by mouth every 8 hours if needed for nausea.                pantoprazole (ProtoNix) 40 mg EC tablet       Sig: TAKE ONE TABLET BY MOUTH ONCE DAILY   potassium chloride CR 20 mEq ER tablet       Sig: TAKE ONE TABLET BY MOUTH ONCE A DAY   sucralfate (Carafate) 1 gram tablet       Sig: Take 1 tablet (1 g) by mouth 2 times a day. Lunch and bedtime   traMADol (Ultram) 50 mg tablet       Sig: Take 1 tablet (50 mg) by mouth every 12 hours if needed for moderate pain (4 - 6).   traZODone (Desyrel) 100 mg tablet       Sig: Take 1 tablet (100 mg) by mouth once daily at bedtime.      Facility-Administered Medications: None       The list below reflectives the updated allergy list. Please review each documented allergy for additional clarification and justification.    Allergies   Allergen Reactions    Penicillins Unknown          06/16/25 at 2:38 PM - Justina Garcia

## 2025-06-16 NOTE — CARE PLAN
Problem: Pain - Adult  Goal: Verbalizes/displays adequate comfort level or baseline comfort level  Outcome: Progressing     Problem: Safety - Adult  Goal: Free from fall injury  Outcome: Progressing     Problem: Discharge Planning  Goal: Discharge to home or other facility with appropriate resources  Outcome: Progressing     Problem: Chronic Conditions and Co-morbidities  Goal: Patient's chronic conditions and co-morbidity symptoms are monitored and maintained or improved  Outcome: Progressing     Problem: Nutrition  Goal: Nutrient intake appropriate for maintaining nutritional needs  Outcome: Progressing   The patient's goals for the shift include to get rest    The clinical goals for the shift include pt will remain safe and free from falls throughout shift    Over the shift, the patient did not make progress toward the following goals. Barriers to progression include . Recommendations to address these barriers include .

## 2025-06-17 ENCOUNTER — TELEPHONE (OUTPATIENT)
Dept: GASTROENTEROLOGY | Facility: HOSPITAL | Age: 70
End: 2025-06-17
Payer: COMMERCIAL

## 2025-06-17 LAB
ALBUMIN SERPL BCP-MCNC: 3.5 G/DL (ref 3.4–5)
ALP SERPL-CCNC: 166 U/L (ref 33–136)
ALT SERPL W P-5'-P-CCNC: 22 U/L (ref 7–45)
ANION GAP SERPL CALC-SCNC: 14 MMOL/L (ref 10–20)
AST SERPL W P-5'-P-CCNC: 28 U/L (ref 9–39)
BILIRUB SERPL-MCNC: 0.3 MG/DL (ref 0–1.2)
BUN SERPL-MCNC: 25 MG/DL (ref 6–23)
CALCIUM SERPL-MCNC: 9 MG/DL (ref 8.6–10.3)
CHLORIDE SERPL-SCNC: 101 MMOL/L (ref 98–107)
CO2 SERPL-SCNC: 25 MMOL/L (ref 21–32)
CREAT SERPL-MCNC: 1.5 MG/DL (ref 0.5–1.05)
EGFRCR SERPLBLD CKD-EPI 2021: 38 ML/MIN/1.73M*2
ERYTHROCYTE [DISTWIDTH] IN BLOOD BY AUTOMATED COUNT: 11.9 % (ref 11.5–14.5)
GLUCOSE BLD MANUAL STRIP-MCNC: 122 MG/DL (ref 74–99)
GLUCOSE BLD MANUAL STRIP-MCNC: 84 MG/DL (ref 74–99)
GLUCOSE BLD MANUAL STRIP-MCNC: 98 MG/DL (ref 74–99)
GLUCOSE SERPL-MCNC: 100 MG/DL (ref 74–99)
HCT VFR BLD AUTO: 28.8 % (ref 36–46)
HGB BLD-MCNC: 9.8 G/DL (ref 12–16)
MAGNESIUM SERPL-MCNC: 1.93 MG/DL (ref 1.6–2.4)
MCH RBC QN AUTO: 31.9 PG (ref 26–34)
MCHC RBC AUTO-ENTMCNC: 34 G/DL (ref 32–36)
MCV RBC AUTO: 94 FL (ref 80–100)
NRBC BLD-RTO: 0 /100 WBCS (ref 0–0)
PHOSPHATE SERPL-MCNC: 3.7 MG/DL (ref 2.5–4.9)
PLATELET # BLD AUTO: 169 X10*3/UL (ref 150–450)
POTASSIUM SERPL-SCNC: 4.7 MMOL/L (ref 3.5–5.3)
PROT SERPL-MCNC: 6.2 G/DL (ref 6.4–8.2)
RBC # BLD AUTO: 3.07 X10*6/UL (ref 4–5.2)
SODIUM SERPL-SCNC: 135 MMOL/L (ref 136–145)
WBC # BLD AUTO: 8.2 X10*3/UL (ref 4.4–11.3)

## 2025-06-17 PROCEDURE — 85027 COMPLETE CBC AUTOMATED: CPT | Performed by: INTERNAL MEDICINE

## 2025-06-17 PROCEDURE — 1200000002 HC GENERAL ROOM WITH TELEMETRY DAILY

## 2025-06-17 PROCEDURE — 84100 ASSAY OF PHOSPHORUS: CPT | Performed by: INTERNAL MEDICINE

## 2025-06-17 PROCEDURE — 83735 ASSAY OF MAGNESIUM: CPT | Performed by: INTERNAL MEDICINE

## 2025-06-17 PROCEDURE — 2500000004 HC RX 250 GENERAL PHARMACY W/ HCPCS (ALT 636 FOR OP/ED): Performed by: INTERNAL MEDICINE

## 2025-06-17 PROCEDURE — 80053 COMPREHEN METABOLIC PANEL: CPT | Performed by: INTERNAL MEDICINE

## 2025-06-17 PROCEDURE — 82947 ASSAY GLUCOSE BLOOD QUANT: CPT

## 2025-06-17 PROCEDURE — 2500000001 HC RX 250 WO HCPCS SELF ADMINISTERED DRUGS (ALT 637 FOR MEDICARE OP): Performed by: INTERNAL MEDICINE

## 2025-06-17 PROCEDURE — 36415 COLL VENOUS BLD VENIPUNCTURE: CPT | Performed by: INTERNAL MEDICINE

## 2025-06-17 RX ORDER — OXYCODONE AND ACETAMINOPHEN 5; 325 MG/1; MG/1
1 TABLET ORAL EVERY 6 HOURS PRN
Status: DISCONTINUED | OUTPATIENT
Start: 2025-06-17 | End: 2025-06-18 | Stop reason: HOSPADM

## 2025-06-17 RX ADMIN — COLESTIPOL HYDROCHLORIDE 1 G: 1 TABLET, FILM COATED ORAL at 10:12

## 2025-06-17 RX ADMIN — Medication 1 TABLET: at 09:20

## 2025-06-17 RX ADMIN — MIRTAZAPINE 15 MG: 15 TABLET, FILM COATED ORAL at 20:19

## 2025-06-17 RX ADMIN — SUCRALFATE 1 G: 1 TABLET ORAL at 20:19

## 2025-06-17 RX ADMIN — COLESTIPOL HYDROCHLORIDE 1 G: 1 TABLET, FILM COATED ORAL at 20:25

## 2025-06-17 RX ADMIN — MORPHINE SULFATE 0.5 MG: 2 INJECTION, SOLUTION INTRAMUSCULAR; INTRAVENOUS at 02:27

## 2025-06-17 RX ADMIN — OXYCODONE HYDROCHLORIDE AND ACETAMINOPHEN 1 TABLET: 5; 325 TABLET ORAL at 20:45

## 2025-06-17 RX ADMIN — MORPHINE SULFATE 0.5 MG: 2 INJECTION, SOLUTION INTRAMUSCULAR; INTRAVENOUS at 09:26

## 2025-06-17 RX ADMIN — PANCRELIPASE 1 CAPSULE: 120000; 24000; 76000 CAPSULE, DELAYED RELEASE PELLETS ORAL at 11:15

## 2025-06-17 RX ADMIN — Medication 1 TABLET: at 20:19

## 2025-06-17 RX ADMIN — SUCRALFATE 1 G: 1 TABLET ORAL at 11:15

## 2025-06-17 RX ADMIN — MORPHINE SULFATE 0.5 MG: 2 INJECTION, SOLUTION INTRAMUSCULAR; INTRAVENOUS at 15:48

## 2025-06-17 RX ADMIN — PANCRELIPASE 1 CAPSULE: 120000; 24000; 76000 CAPSULE, DELAYED RELEASE PELLETS ORAL at 17:02

## 2025-06-17 RX ADMIN — PANTOPRAZOLE SODIUM 40 MG: 40 TABLET, DELAYED RELEASE ORAL at 06:29

## 2025-06-17 RX ADMIN — TRAZODONE HYDROCHLORIDE 100 MG: 50 TABLET ORAL at 20:19

## 2025-06-17 RX ADMIN — PANCRELIPASE 1 CAPSULE: 120000; 24000; 76000 CAPSULE, DELAYED RELEASE PELLETS ORAL at 09:19

## 2025-06-17 ASSESSMENT — PAIN SCALES - GENERAL
PAINLEVEL_OUTOF10: 8
PAINLEVEL_OUTOF10: 8
PAINLEVEL_OUTOF10: 3
PAINLEVEL_OUTOF10: 7
PAINLEVEL_OUTOF10: 3
PAINLEVEL_OUTOF10: 8
PAINLEVEL_OUTOF10: 2

## 2025-06-17 ASSESSMENT — PAIN - FUNCTIONAL ASSESSMENT
PAIN_FUNCTIONAL_ASSESSMENT: 0-10

## 2025-06-17 ASSESSMENT — COGNITIVE AND FUNCTIONAL STATUS - GENERAL
MOBILITY SCORE: 24
MOBILITY SCORE: 24
DAILY ACTIVITIY SCORE: 24
DAILY ACTIVITIY SCORE: 24

## 2025-06-17 ASSESSMENT — PAIN DESCRIPTION - DESCRIPTORS
DESCRIPTORS: ACHING
DESCRIPTORS: ACHING
DESCRIPTORS: PRESSURE
DESCRIPTORS: PRESSURE

## 2025-06-17 ASSESSMENT — PAIN DESCRIPTION - LOCATION
LOCATION: ABDOMEN

## 2025-06-17 NOTE — PROGRESS NOTES
INTERNAL MEDICINE PROGRESS NOTE      HPI:    Reports persistent abdominal pain.  Has had no emesis though.  Had a formed bowel movement today.    Vital signs in last 24 hours:  Temp:  [36.3 °C (97.4 °F)-36.8 °C (98.2 °F)] 36.6 °C (97.9 °F)  Heart Rate:  [56-97] 65  Resp:  [12-20] 17  BP: (120-156)/(53-69) 142/69    Physical Examination:  Physical Exam      Constitutional:       Appearance: Middle-aged, asthenic build, in no distress  HENT:      Head: Normocephalic and atraumatic.   Eyes:      Extraocular Movements: Extraocular movements intact.      Pupils: Pupils are equal, round, and reactive to light.   Cardiovascular:      Rate and Rhythm: Normal rate and regular rhythm.      Pulses: Normal pulses.      Heart sounds: Normal heart sounds.   Pulmonary:      Effort: Pulmonary effort is normal.      Breath sounds: Normal breath sounds.   Abdominal:      General: Abdomen is flat. Bowel sounds are normal.      Palpations: Abdomen is soft.  Mild epigastric tenderness, no masses.  Musculoskeletal:         General: Normal range of motion.      Cervical back: Normal range of motion and neck supple.   Skin:     General: Skin is warm and dry.   Neurological:      General: No focal deficit present.      Mental Status: Alert and oriented x 3, speech fluent    Medications:  Per MAR    Laboratory Findings:  Lab Results   Component Value Date    WBC 8.2 06/17/2025    HGB 9.8 (L) 06/17/2025    HCT 28.8 (L) 06/17/2025    MCV 94 06/17/2025     06/17/2025     Lab Results   Component Value Date    INR 1.0 04/26/2023    INR 1.2 (H) 04/12/2023    INR 1.4 (H) 06/13/2022    PROTIME 11.5 04/26/2023    PROTIME 13.4 04/12/2023    PROTIME 16.1 (H) 06/13/2022     Lab Results   Component Value Date    GLUCOSE 100 (H) 06/17/2025    CALCIUM 9.0 06/17/2025     (L) 06/17/2025    K 4.7 06/17/2025    CO2 25 06/17/2025     06/17/2025    BUN 25 (H) 06/17/2025    CREATININE 1.50 (H) 06/17/2025       Assessment and  Plan:    Acute on chronic pancreatitis -ERCP to be done soon.  Will discuss with GI regarding timing.  Hyperkalemia -resolved, monitor electrolytes.  Lisinopril on hold.  Severe protein calorie malnutrition -continue with supplements, Remeron.  Abdominal pain -analgesics as per orders.      Albert Corea MD  06/17/25  10:33 AM

## 2025-06-17 NOTE — CARE PLAN
Problem: Pain - Adult  Goal: Verbalizes/displays adequate comfort level or baseline comfort level  Outcome: Progressing     Problem: Safety - Adult  Goal: Free from fall injury  Outcome: Progressing   The patient's goals for the shift include to get rest    The clinical goals for the shift include pt will remain safe and free from falls throughout shift    Over the shift, the patient did not make progress toward the following goals. Barriers to progression include . Recommendations to address these barriers include .

## 2025-06-17 NOTE — PROGRESS NOTES
Care Coordinator Note:    Plan: Patient  with Acute on chronic pancreatitis -ERCP to be done soon. GI following. Abd pain continues. BM today     Status: inpatient  Payor: [employer] dual  Disposition: Home alone. No needs  Barrier: ERCP, GI clearance  ADOD: 2-3 days    [facility]      06/17/25 1402   Discharge Planning   Expected Discharge Disposition Home   Intensity of Service   Intensity of Service 0-30 min

## 2025-06-17 NOTE — TELEPHONE ENCOUNTER
Shane/brother/[phone]  Calling to check in. Sister has been at [facility] since Friday. She is getting morphine every 6 hours for the pain. They are talking about discharging her. Shane said she will end up at the ER again at [facility]. He is looking for any updates on her care. Can procedure be done while she is admitted?

## 2025-06-17 NOTE — CARE PLAN
The patient's goals for the shift include have a decrease in pain.    The clinical goals for the shift include Resident will have a decrease in abdominal pain

## 2025-06-17 NOTE — CARE PLAN
The patient's goals for the shift include to get rest    The clinical goals for the shift include Resident will have a decrease in abdominal pain

## 2025-06-18 ENCOUNTER — PHARMACY VISIT (OUTPATIENT)
Dept: PHARMACY | Facility: CLINIC | Age: 70
End: 2025-06-18
Payer: COMMERCIAL

## 2025-06-18 VITALS
SYSTOLIC BLOOD PRESSURE: 147 MMHG | BODY MASS INDEX: 16.45 KG/M2 | TEMPERATURE: 98.7 F | HEART RATE: 63 BPM | WEIGHT: 83.8 LBS | OXYGEN SATURATION: 96 % | RESPIRATION RATE: 13 BRPM | DIASTOLIC BLOOD PRESSURE: 61 MMHG | HEIGHT: 60 IN

## 2025-06-18 DIAGNOSIS — R10.9 ACUTE ABDOMINAL PAIN: ICD-10-CM

## 2025-06-18 LAB
ALBUMIN SERPL BCP-MCNC: 3.3 G/DL (ref 3.4–5)
ALP SERPL-CCNC: 139 U/L (ref 33–136)
ALT SERPL W P-5'-P-CCNC: 18 U/L (ref 7–45)
ANION GAP SERPL CALC-SCNC: 12 MMOL/L (ref 10–20)
AST SERPL W P-5'-P-CCNC: 23 U/L (ref 9–39)
BILIRUB SERPL-MCNC: 0.3 MG/DL (ref 0–1.2)
BUN SERPL-MCNC: 27 MG/DL (ref 6–23)
CALCIUM SERPL-MCNC: 8.8 MG/DL (ref 8.6–10.3)
CHLORIDE SERPL-SCNC: 100 MMOL/L (ref 98–107)
CO2 SERPL-SCNC: 26 MMOL/L (ref 21–32)
CREAT SERPL-MCNC: 1.38 MG/DL (ref 0.5–1.05)
EGFRCR SERPLBLD CKD-EPI 2021: 42 ML/MIN/1.73M*2
ERYTHROCYTE [DISTWIDTH] IN BLOOD BY AUTOMATED COUNT: 12 % (ref 11.5–14.5)
GLUCOSE SERPL-MCNC: 110 MG/DL (ref 74–99)
HCT VFR BLD AUTO: 30.6 % (ref 36–46)
HGB BLD-MCNC: 9.8 G/DL (ref 12–16)
MCH RBC QN AUTO: 31.3 PG (ref 26–34)
MCHC RBC AUTO-ENTMCNC: 32 G/DL (ref 32–36)
MCV RBC AUTO: 98 FL (ref 80–100)
NRBC BLD-RTO: 0 /100 WBCS (ref 0–0)
PLATELET # BLD AUTO: 165 X10*3/UL (ref 150–450)
POTASSIUM SERPL-SCNC: 4.4 MMOL/L (ref 3.5–5.3)
PROT SERPL-MCNC: 5.6 G/DL (ref 6.4–8.2)
RBC # BLD AUTO: 3.13 X10*6/UL (ref 4–5.2)
SODIUM SERPL-SCNC: 134 MMOL/L (ref 136–145)
WBC # BLD AUTO: 6.7 X10*3/UL (ref 4.4–11.3)

## 2025-06-18 PROCEDURE — 85027 COMPLETE CBC AUTOMATED: CPT | Performed by: INTERNAL MEDICINE

## 2025-06-18 PROCEDURE — 84075 ASSAY ALKALINE PHOSPHATASE: CPT | Performed by: INTERNAL MEDICINE

## 2025-06-18 PROCEDURE — 36415 COLL VENOUS BLD VENIPUNCTURE: CPT | Performed by: INTERNAL MEDICINE

## 2025-06-18 PROCEDURE — 2500000001 HC RX 250 WO HCPCS SELF ADMINISTERED DRUGS (ALT 637 FOR MEDICARE OP): Performed by: INTERNAL MEDICINE

## 2025-06-18 PROCEDURE — 2500000004 HC RX 250 GENERAL PHARMACY W/ HCPCS (ALT 636 FOR OP/ED): Performed by: INTERNAL MEDICINE

## 2025-06-18 PROCEDURE — RXMED WILLOW AMBULATORY MEDICATION CHARGE

## 2025-06-18 RX ORDER — OXYCODONE AND ACETAMINOPHEN 5; 325 MG/1; MG/1
1 TABLET ORAL EVERY 6 HOURS PRN
Qty: 15 TABLET | Refills: 0 | Status: SHIPPED | OUTPATIENT
Start: 2025-06-18 | End: 2025-06-25

## 2025-06-18 RX ORDER — POLYETHYLENE GLYCOL 3350 17 G/17G
17 POWDER, FOR SOLUTION ORAL DAILY
Qty: 238 G | Refills: 0 | Status: SHIPPED | OUTPATIENT
Start: 2025-06-18

## 2025-06-18 RX ORDER — DOCUSATE SODIUM 100 MG/1
100 CAPSULE, LIQUID FILLED ORAL DAILY
Qty: 30 CAPSULE | Refills: 0 | Status: SHIPPED | OUTPATIENT
Start: 2025-06-18 | End: 2025-07-18

## 2025-06-18 RX ADMIN — COLESTIPOL HYDROCHLORIDE 1 G: 1 TABLET, FILM COATED ORAL at 08:38

## 2025-06-18 RX ADMIN — PANTOPRAZOLE SODIUM 40 MG: 40 TABLET, DELAYED RELEASE ORAL at 06:07

## 2025-06-18 RX ADMIN — DOCUSATE SODIUM 100 MG: 100 CAPSULE, LIQUID FILLED ORAL at 08:38

## 2025-06-18 RX ADMIN — OXYCODONE HYDROCHLORIDE AND ACETAMINOPHEN 1 TABLET: 5; 325 TABLET ORAL at 09:19

## 2025-06-18 RX ADMIN — OXYCODONE HYDROCHLORIDE AND ACETAMINOPHEN 1 TABLET: 5; 325 TABLET ORAL at 03:08

## 2025-06-18 RX ADMIN — PANCRELIPASE 1 CAPSULE: 120000; 24000; 76000 CAPSULE, DELAYED RELEASE PELLETS ORAL at 08:37

## 2025-06-18 RX ADMIN — Medication 1 TABLET: at 08:38

## 2025-06-18 ASSESSMENT — PAIN - FUNCTIONAL ASSESSMENT
PAIN_FUNCTIONAL_ASSESSMENT: 0-10
PAIN_FUNCTIONAL_ASSESSMENT: 0-10

## 2025-06-18 ASSESSMENT — PAIN SCALES - GENERAL
PAINLEVEL_OUTOF10: 7
PAINLEVEL_OUTOF10: 10 - WORST POSSIBLE PAIN

## 2025-06-18 ASSESSMENT — PAIN DESCRIPTION - LOCATION
LOCATION: ABDOMEN
LOCATION: ABDOMEN

## 2025-06-18 ASSESSMENT — PAIN DESCRIPTION - DESCRIPTORS
DESCRIPTORS: ACHING
DESCRIPTORS: ACHING;HEADACHE

## 2025-06-18 NOTE — PROGRESS NOTES
INTERNAL MEDICINE PROGRESS NOTE      HPI:    Has had fair pain control.  Tolerating diet.    Vital signs in last 24 hours:  Temp:  [36.5 °C (97.7 °F)-37.1 °C (98.7 °F)] 37.1 °C (98.7 °F)  Heart Rate:  [56-73] 63  Resp:  [10-26] 13  BP: (141-174)/(61-73) 147/61    Physical Examination:  Physical Exam      Constitutional:       Appearance: Middle-aged, asthenic build, in no distress  HENT:      Head: Normocephalic and atraumatic.   Eyes:      Extraocular Movements: Extraocular movements intact.      Pupils: Pupils are equal, round, and reactive to light.   Cardiovascular:      Rate and Rhythm: Normal rate and regular rhythm.      Pulses: Normal pulses.      Heart sounds: Normal heart sounds.   Pulmonary:      Effort: Pulmonary effort is normal.      Breath sounds: Normal breath sounds.   Abdominal:      General: Abdomen is flat. Bowel sounds are normal.      Palpations: Abdomen is soft.  Mild epigastric tenderness, no masses.  Musculoskeletal:         General: Normal range of motion.      Cervical back: Normal range of motion and neck supple.   Skin:     General: Skin is warm and dry.   Neurological:      General: No focal deficit present.      Mental Status: Alert and oriented x 3, speech fluent    Medications:  Per MAR    Laboratory Findings:  Lab Results   Component Value Date    WBC 6.7 06/18/2025    HGB 9.8 (L) 06/18/2025    HCT 30.6 (L) 06/18/2025    MCV 98 06/18/2025     06/18/2025     Lab Results   Component Value Date    INR 1.0 04/26/2023    INR 1.2 (H) 04/12/2023    INR 1.4 (H) 06/13/2022    PROTIME 11.5 04/26/2023    PROTIME 13.4 04/12/2023    PROTIME 16.1 (H) 06/13/2022     Lab Results   Component Value Date    GLUCOSE 110 (H) 06/18/2025    CALCIUM 8.8 06/18/2025     (L) 06/18/2025    K 4.4 06/18/2025    CO2 26 06/18/2025     06/18/2025    BUN 27 (H) 06/18/2025    CREATININE 1.38 (H) 06/18/2025       Assessment and Plan:    Acute on chronic pancreatitis -ERCP to be done in 1  week.  Analgesics as needed.  Diet counseled.  Hyperkalemia -resolved, monitor electrolytes.  Lisinopril discontinued.  Severe protein calorie malnutrition -continue with supplements, Remeron.  Abdominal pain -analgesics as needed.    Discharge home today.      Albert Corea MD  06/18/25  9:48 AM

## 2025-06-18 NOTE — DISCHARGE INSTRUCTIONS
Low-fat low-sodium diet    Follow-up for ERCP on June 25.    Follow-up with PCP within 2 weeks.    Activity as tolerated

## 2025-06-23 LAB
ATRIAL RATE: 57 BPM
P AXIS: 81 DEGREES
P OFFSET: 209 MS
P ONSET: 163 MS
PR INTERVAL: 124 MS
Q ONSET: 225 MS
QRS COUNT: 9 BEATS
QRS DURATION: 86 MS
QT INTERVAL: 372 MS
QTC CALCULATION(BAZETT): 362 MS
QTC FREDERICIA: 365 MS
R AXIS: 81 DEGREES
T AXIS: 82 DEGREES
T OFFSET: 411 MS
VENTRICULAR RATE: 57 BPM

## 2025-06-24 ENCOUNTER — ANESTHESIA EVENT (OUTPATIENT)
Dept: GASTROENTEROLOGY | Facility: HOSPITAL | Age: 70
End: 2025-06-24
Payer: COMMERCIAL

## 2025-06-24 PROBLEM — N18.9 CHRONIC RENAL INSUFFICIENCY: Status: ACTIVE | Noted: 2025-06-24

## 2025-06-24 PROBLEM — D64.9 ANEMIA: Status: ACTIVE | Noted: 2025-06-24

## 2025-06-25 ENCOUNTER — HOSPITAL ENCOUNTER (OUTPATIENT)
Dept: GASTROENTEROLOGY | Facility: HOSPITAL | Age: 70
Discharge: HOME | End: 2025-06-25
Payer: COMMERCIAL

## 2025-06-25 ENCOUNTER — ANESTHESIA (OUTPATIENT)
Dept: GASTROENTEROLOGY | Facility: HOSPITAL | Age: 70
End: 2025-06-25
Payer: COMMERCIAL

## 2025-06-25 ENCOUNTER — HOSPITAL ENCOUNTER (OUTPATIENT)
Dept: RADIOLOGY | Facility: HOSPITAL | Age: 70
Discharge: HOME | End: 2025-06-25
Payer: COMMERCIAL

## 2025-06-25 VITALS
BODY MASS INDEX: 16.01 KG/M2 | DIASTOLIC BLOOD PRESSURE: 54 MMHG | TEMPERATURE: 97.2 F | SYSTOLIC BLOOD PRESSURE: 169 MMHG | HEART RATE: 50 BPM | RESPIRATION RATE: 14 BRPM | HEIGHT: 60 IN | OXYGEN SATURATION: 99 % | WEIGHT: 81.57 LBS

## 2025-06-25 DIAGNOSIS — K83.1 OBSTRUCTION OF BILE DUCT: ICD-10-CM

## 2025-06-25 DIAGNOSIS — K86.89: ICD-10-CM

## 2025-06-25 DIAGNOSIS — K85.90: ICD-10-CM

## 2025-06-25 DIAGNOSIS — K86.0 ALCOHOL-INDUCED CHRONIC PANCREATITIS (MULTI): ICD-10-CM

## 2025-06-25 PROCEDURE — A43262 PR ERCP,SPHINCTEROTOMY: Performed by: ANESTHESIOLOGY

## 2025-06-25 PROCEDURE — A43262 PR ERCP,SPHINCTEROTOMY: Performed by: NURSE ANESTHETIST, CERTIFIED REGISTERED

## 2025-06-25 PROCEDURE — 7100000009 HC PHASE TWO TIME - INITIAL BASE CHARGE

## 2025-06-25 PROCEDURE — 7100000002 HC RECOVERY ROOM TIME - EACH INCREMENTAL 1 MINUTE

## 2025-06-25 PROCEDURE — 3700000001 HC GENERAL ANESTHESIA TIME - INITIAL BASE CHARGE

## 2025-06-25 PROCEDURE — 7100000001 HC RECOVERY ROOM TIME - INITIAL BASE CHARGE

## 2025-06-25 PROCEDURE — 43262 ENDO CHOLANGIOPANCREATOGRAPH: CPT | Mod: 59 | Performed by: INTERNAL MEDICINE

## 2025-06-25 PROCEDURE — 2500000004 HC RX 250 GENERAL PHARMACY W/ HCPCS (ALT 636 FOR OP/ED): Performed by: NURSE ANESTHETIST, CERTIFIED REGISTERED

## 2025-06-25 PROCEDURE — 2500000005 HC RX 250 GENERAL PHARMACY W/O HCPCS: Performed by: NURSE ANESTHETIST, CERTIFIED REGISTERED

## 2025-06-25 PROCEDURE — C1769 GUIDE WIRE: HCPCS

## 2025-06-25 PROCEDURE — 2550000001 HC RX 255 CONTRASTS: Mod: JW | Performed by: INTERNAL MEDICINE

## 2025-06-25 PROCEDURE — 3700000002 HC GENERAL ANESTHESIA TIME - EACH INCREMENTAL 1 MINUTE

## 2025-06-25 PROCEDURE — 2720000007 HC OR 272 NO HCPCS

## 2025-06-25 PROCEDURE — 7100000010 HC PHASE TWO TIME - EACH INCREMENTAL 1 MINUTE

## 2025-06-25 RX ORDER — LIDOCAINE HYDROCHLORIDE 10 MG/ML
2 INJECTION, SOLUTION EPIDURAL; INFILTRATION; INTRACAUDAL; PERINEURAL ONCE
Status: DISCONTINUED | OUTPATIENT
Start: 2025-06-25 | End: 2025-06-26 | Stop reason: HOSPADM

## 2025-06-25 RX ORDER — LIDOCAINE HYDROCHLORIDE 40 MG/ML
SOLUTION TOPICAL AS NEEDED
Status: DISCONTINUED | OUTPATIENT
Start: 2025-06-25 | End: 2025-06-25

## 2025-06-25 RX ORDER — LIDOCAINE HYDROCHLORIDE 20 MG/ML
INJECTION, SOLUTION INFILTRATION; PERINEURAL AS NEEDED
Status: DISCONTINUED | OUTPATIENT
Start: 2025-06-25 | End: 2025-06-25

## 2025-06-25 RX ORDER — LIDOCAINE HYDROCHLORIDE 10 MG/ML
0.1 INJECTION, SOLUTION EPIDURAL; INFILTRATION; INTRACAUDAL; PERINEURAL ONCE
Status: DISCONTINUED | OUTPATIENT
Start: 2025-06-25 | End: 2025-06-26 | Stop reason: HOSPADM

## 2025-06-25 RX ORDER — PROPOFOL 10 MG/ML
INJECTION, EMULSION INTRAVENOUS AS NEEDED
Status: DISCONTINUED | OUTPATIENT
Start: 2025-06-25 | End: 2025-06-25

## 2025-06-25 RX ORDER — ONDANSETRON HYDROCHLORIDE 2 MG/ML
4 INJECTION, SOLUTION INTRAVENOUS ONCE AS NEEDED
Status: DISCONTINUED | OUTPATIENT
Start: 2025-06-25 | End: 2025-06-26 | Stop reason: HOSPADM

## 2025-06-25 RX ORDER — MIDAZOLAM HYDROCHLORIDE 1 MG/ML
INJECTION INTRAMUSCULAR; INTRAVENOUS AS NEEDED
Status: DISCONTINUED | OUTPATIENT
Start: 2025-06-25 | End: 2025-06-25

## 2025-06-25 RX ORDER — OXYCODONE HYDROCHLORIDE 5 MG/1
5 TABLET ORAL EVERY 4 HOURS PRN
Status: DISCONTINUED | OUTPATIENT
Start: 2025-06-25 | End: 2025-06-26 | Stop reason: HOSPADM

## 2025-06-25 RX ORDER — SUCCINYLCHOLINE CHLORIDE 20 MG/ML
INJECTION INTRAMUSCULAR; INTRAVENOUS AS NEEDED
Status: DISCONTINUED | OUTPATIENT
Start: 2025-06-25 | End: 2025-06-25

## 2025-06-25 RX ORDER — MEPERIDINE HYDROCHLORIDE 25 MG/ML
12.5 INJECTION INTRAMUSCULAR; INTRAVENOUS; SUBCUTANEOUS EVERY 10 MIN PRN
Status: DISCONTINUED | OUTPATIENT
Start: 2025-06-25 | End: 2025-06-26 | Stop reason: HOSPADM

## 2025-06-25 RX ORDER — METOCLOPRAMIDE HYDROCHLORIDE 5 MG/ML
10 INJECTION INTRAMUSCULAR; INTRAVENOUS ONCE AS NEEDED
Status: DISCONTINUED | OUTPATIENT
Start: 2025-06-25 | End: 2025-06-26 | Stop reason: HOSPADM

## 2025-06-25 RX ADMIN — PROPOFOL 70 MG: 10 INJECTION, EMULSION INTRAVENOUS at 08:36

## 2025-06-25 RX ADMIN — MIDAZOLAM HYDROCHLORIDE 1 MG: 1 INJECTION, SOLUTION INTRAMUSCULAR; INTRAVENOUS at 08:36

## 2025-06-25 RX ADMIN — IOHEXOL 10 ML: 300 INJECTION, SOLUTION INTRAVENOUS at 08:42

## 2025-06-25 RX ADMIN — SUCCINYLCHOLINE CHLORIDE 60 MG: 20 INJECTION, SOLUTION INTRAMUSCULAR; INTRAVENOUS at 08:36

## 2025-06-25 RX ADMIN — MIDAZOLAM HYDROCHLORIDE 1 MG: 1 INJECTION, SOLUTION INTRAMUSCULAR; INTRAVENOUS at 08:38

## 2025-06-25 RX ADMIN — LIDOCAINE HYDROCHLORIDE 4 ML: 40 SOLUTION TOPICAL at 08:36

## 2025-06-25 RX ADMIN — SODIUM CHLORIDE, POTASSIUM CHLORIDE, SODIUM LACTATE AND CALCIUM CHLORIDE: 600; 310; 30; 20 INJECTION, SOLUTION INTRAVENOUS at 08:33

## 2025-06-25 RX ADMIN — LIDOCAINE HYDROCHLORIDE 40 MG: 20 INJECTION, SOLUTION INFILTRATION; PERINEURAL at 08:36

## 2025-06-25 ASSESSMENT — PAIN - FUNCTIONAL ASSESSMENT
PAIN_FUNCTIONAL_ASSESSMENT: 0-10

## 2025-06-25 ASSESSMENT — PAIN SCALES - GENERAL
PAIN_LEVEL: 0
PAINLEVEL_OUTOF10: 0 - NO PAIN

## 2025-06-25 ASSESSMENT — COLUMBIA-SUICIDE SEVERITY RATING SCALE - C-SSRS
2. HAVE YOU ACTUALLY HAD ANY THOUGHTS OF KILLING YOURSELF?: NO
1. IN THE PAST MONTH, HAVE YOU WISHED YOU WERE DEAD OR WISHED YOU COULD GO TO SLEEP AND NOT WAKE UP?: NO
6. HAVE YOU EVER DONE ANYTHING, STARTED TO DO ANYTHING, OR PREPARED TO DO ANYTHING TO END YOUR LIFE?: NO

## 2025-06-25 NOTE — DISCHARGE INSTRUCTIONS

## 2025-06-25 NOTE — Clinical Note
Abdomen is soft and non-distended. Skin intact pre and post bovie pad. ERCP cap intact, no tissue noted.

## 2025-06-25 NOTE — ANESTHESIA POSTPROCEDURE EVALUATION
Patient: Jodi Johnson    Procedure Summary       Date: 06/25/25 Room / Location: [facility]; [facility]    Anesthesia Start: 0834 Anesthesia Stop: 0913    Procedures:       [place] GI ERCP      ERCP Diagnosis:       Obstruction of bile duct      Alcohol-induced chronic pancreatitis (Multi)      Pancreatitis due to obstruction of pancreatic duct, without necrosis or infection      (ERCP)    Scheduled Providers: Miller Mcrae DO; Karthik Goodman MD; JUNO Naranjo-CRNA Responsible Provider: Karthik Goodman MD    Anesthesia Type: general ASA Status: 4            Anesthesia Type: general    Vitals Value Taken Time   /57 06/25/25 09:46   Temp 36 °C (96.8 °F) 06/25/25 09:08   Pulse 49 06/25/25 09:51   Resp 18 06/25/25 09:30   SpO2 97 % 06/25/25 09:51   Vitals shown include unfiled device data.    Anesthesia Post Evaluation    Patient location during evaluation: bedside  Patient participation: complete - patient cannot participate  Level of consciousness: awake  Pain score: 0  Pain management: adequate  Airway patency: patent  Cardiovascular status: acceptable and hemodynamically stable  Respiratory status: acceptable and nonlabored ventilation  Hydration status: acceptable  Postoperative Nausea and Vomiting: none        No notable events documented.

## 2025-06-25 NOTE — INTERVAL H&P NOTE
H&P reviewed. The patient was examined and there are no changes to the H&P.    Her pain is stable today and eating as much as possible with Creon.  She is here with her brother and understands goal of procedure - placement of PD stent with or without PD stone removal.  OK to proceed.    Miller Mcrae, DO

## 2025-06-25 NOTE — ANESTHESIA PROCEDURE NOTES
Airway  Date/Time: 6/25/2025 8:41 AM  Reason: elective    Airway not difficult    Staffing  Performed: CRNA   Authorized by: Karthik Goodman MD    Performed by: JUNO Naranjo-YESSICA  Patient location during procedure: OR    Patient Condition  Indications for airway management: anesthesia  Patient position: sniffing  MILS maintained throughout  Sedation level: no sedation     Final Airway Details   Preoxygenated: yes  Final airway type: endotracheal airway  Successful airway: ETT  Cuffed: yes   Successful intubation technique: direct laryngoscopy  Adjuncts used in placement: intubating stylet and cricoid pressure  Endotracheal tube insertion site: oral  Blade: Zack  Blade size: #3  ETT size (mm): 7.0  Cormack-Lehane Classification: grade I - full view of glottis  Placement verified by: chest auscultation and capnometry   Measured from: lips  ETT to lips (cm): 20  Number of attempts at approach: 1    Additional Comments  Edentulous large floppy epiglottis

## 2025-07-25 NOTE — PROGRESS NOTES
Subjective           Surgical History[1]     Medical History[2]     HPI  This 69 year old patient presents today with left hip pain which she rates at 7/10. The patient states that this left hip pain has been  persistent for quite some time, but has gotten worse over the past few months.  The patient denies any recent trauma or injury to left hip. Past medical history is significant for a left femur ORIF performed by me approximately 11 years ago. .The patient states that their left hip pain is worse with and aggravated by prolonged walking, standing, climbing stairs, and laying on her left side.  She is also experiencing left thigh pain. The patient states that this left hip pain impairs their ability to complete normal activities of daily living. The patient has tried tylenol and ibuprofen with no relief.    RX Allergies[3]   Body mass index is 15.62 kg/m².     ROS  CARDIOLOGY:   Negative for chest pain, shortness of breath.   RESPIRATORY:   Negative for chest pain, shortness of breath.   MUSCULOSKELETAL:   See HPI for details.   NEUROLOGY:   Negative for tingling, numbness, weakness.    Objective      Body mass index is 15.62 kg/m².     Physical Exam  GENERAL:          General Appearance:  This is a pleasant patient with appropriate affect, in no acute distress.   DERMATOLOGY:          Skin: skin at the neck, upper and lower back, and trunk is intact. There is no evidence of skin rash, skin breakdown or ulceration, or atrophic skin change.   EXTREMITIES:          Vascular:  Right, left hands and feet are warm with good color and pulses. Right and left calf and thigh are nontender and nonswollen.   NEUROLOGICAL:          Orientation:  Patient is alert and oriented to person, place, time and situation. Right and left upper and lower extremity motor and sensory examinations are intact.  MUSCULOSKELETAL: Neck: No tenderness. No pain or limitation with range of motion. Back: No tenderness. Straight leg test negative  bilaterally. Right hip: nontender. No pain or limitation with ROM. Left hip: There is not tenderness at the greater trochanter. There is not pain with gentle ROM in flexion and extension at the hip. There is not pain with external rotation and abduction.  The left hip skin is intact.  The tip of the helical blade in the left hip is palpable but nontender.  bilateral lower extremity is in good position. Nontender at the calf. Neurovascular is intact. The patient is seen walking today walking with a normal gait.  X-rays of the left hip done and read in the office today are reviewed with the patient in the office today and show that there has been operative reduction and internal fixation of left hip fracture with a trochanteric nail, helical blade and distal interlocking screw.  Hip joint surface cartilage is intact.  Imaging  No results found.    Cardiology, Vascular, and Other Imaging  No other imaging results found for the past 7 days       Jodi was seen today for pain.  Diagnoses and all orders for this visit:  Left hip pain  -     XR hip left with pelvis when performed 2 or 3 views; Future  History of open reduction and internal fixation (ORIF) procedure     Options are discussed with the patient in detail. The patient is instructed regarding activity modification and risk for further injury with falling or trauma, ice, provider directed at home gentle strengthening and ROM exercises, avoiding lying on her left side while sleeping and soft padding at the lateral aspect of the hip while sleeping and the appropriate use of Tylenol as needed for pain with its potential adverse reactions and side effects. The patient understands.  She asks if any further options are available.  I discussed removal of the internal fixation of the left hip with its risks and benefits.  The patient asked my opinion and it is my strong opinion that the benefits of removal of the internal fixation from the left hip do not justify the  risks.  Return as needed. Please note that this report has been produced using speech recognition software.  It may contain errors related to grammar, punctuation or spelling.  Electronically signed, but not reviewed.   Rico Valdovinos MD           [1]   Past Surgical History:  Procedure Laterality Date    BREAST LUMPECTOMY  05/24/2013    Breast Surgery Lumpectomy    CHOLECYSTECTOMY      2025    COLONOSCOPY  2017    normal. Due in 2027    CT GUIDED PERCUTANEOUS BIOPSY MEDIASTINUM  06/24/2022    CT GUIDED PERCUTANEOUS BIOPSY MEDIASTINUM 6/24/2022 [facility]ICAL [facility]GAC[facility]    FEMUR FRACTURE SURGERY      PANCREAS SURGERY      2025    US GUIDED ABSCESS FLUID COLLECTION DRAINAGE  06/06/2022    US GUIDED ABSCESS FLUID COLLECTION DRAINAGE 6/6/2022 [facility] CLINICAL LEGACY    US GUIDED ABSCESS FLUID COLLECTION DRAINAGE  06/06/2022    US GUIDED ABSCESS FLUID COLLECTION DRAINAGE 6/6/2022 [facility] CLINICAL [facility]GAC[facility]   [2]   Past Medical History:  Diagnosis Date    Age-related nuclear cataract of both eyes     Irregular astigmatism, bilateral     Pancreatitis, chronic (Multi)     Unspecified disorder of refraction    [3]   Allergies  Allergen Reactions    Penicillins Unknown     Tolerated cefepime

## 2025-08-01 ENCOUNTER — OFFICE VISIT (OUTPATIENT)
Dept: ORTHOPEDIC SURGERY | Facility: CLINIC | Age: 70
End: 2025-08-01
Payer: COMMERCIAL

## 2025-08-01 ENCOUNTER — HOSPITAL ENCOUNTER (OUTPATIENT)
Dept: RADIOLOGY | Facility: CLINIC | Age: 70
Discharge: HOME | End: 2025-08-01
Payer: COMMERCIAL

## 2025-08-01 VITALS — BODY MASS INDEX: 15.71 KG/M2 | WEIGHT: 80 LBS | HEIGHT: 60 IN

## 2025-08-01 DIAGNOSIS — M25.552 LEFT HIP PAIN: ICD-10-CM

## 2025-08-01 DIAGNOSIS — Z98.890 HISTORY OF OPEN REDUCTION AND INTERNAL FIXATION (ORIF) PROCEDURE: ICD-10-CM

## 2025-08-01 PROCEDURE — 3008F BODY MASS INDEX DOCD: CPT | Performed by: ORTHOPAEDIC SURGERY

## 2025-08-01 PROCEDURE — 1159F MED LIST DOCD IN RCRD: CPT | Performed by: ORTHOPAEDIC SURGERY

## 2025-08-01 PROCEDURE — 1160F RVW MEDS BY RX/DR IN RCRD: CPT | Performed by: ORTHOPAEDIC SURGERY

## 2025-08-01 PROCEDURE — 73502 X-RAY EXAM HIP UNI 2-3 VIEWS: CPT | Mod: LT

## 2025-08-01 PROCEDURE — 99213 OFFICE O/P EST LOW 20 MIN: CPT | Performed by: ORTHOPAEDIC SURGERY

## 2025-08-01 PROCEDURE — 1125F AMNT PAIN NOTED PAIN PRSNT: CPT | Performed by: ORTHOPAEDIC SURGERY

## 2025-08-01 PROCEDURE — 99203 OFFICE O/P NEW LOW 30 MIN: CPT | Performed by: ORTHOPAEDIC SURGERY

## 2025-08-01 PROCEDURE — 73502 X-RAY EXAM HIP UNI 2-3 VIEWS: CPT | Mod: LEFT SIDE | Performed by: ORTHOPAEDIC SURGERY

## 2025-08-01 PROCEDURE — 99214 OFFICE O/P EST MOD 30 MIN: CPT | Performed by: ORTHOPAEDIC SURGERY

## 2025-08-01 PROCEDURE — 1157F ADVNC CARE PLAN IN RCRD: CPT | Performed by: ORTHOPAEDIC SURGERY

## 2025-08-01 ASSESSMENT — LIFESTYLE VARIABLES
HOW OFTEN DURING THE LAST YEAR HAVE YOU BEEN UNABLE TO REMEMBER WHAT HAPPENED THE NIGHT BEFORE BECAUSE YOU HAD BEEN DRINKING: NEVER
AUDIT-C TOTAL SCORE: 0
HOW OFTEN DURING THE LAST YEAR HAVE YOU FOUND THAT YOU WERE NOT ABLE TO STOP DRINKING ONCE YOU HAD STARTED: NEVER
HOW OFTEN DO YOU HAVE A DRINK CONTAINING ALCOHOL: NEVER
HOW OFTEN DURING THE LAST YEAR HAVE YOU NEEDED AN ALCOHOLIC DRINK FIRST THING IN THE MORNING TO GET YOURSELF GOING AFTER A NIGHT OF HEAVY DRINKING: NEVER
HOW OFTEN DURING THE LAST YEAR HAVE YOU FAILED TO DO WHAT WAS NORMALLY EXPECTED FROM YOU BECAUSE OF DRINKING: NEVER
HAVE YOU OR SOMEONE ELSE BEEN INJURED AS A RESULT OF YOUR DRINKING: NO
HAS A RELATIVE, FRIEND, DOCTOR, OR ANOTHER HEALTH PROFESSIONAL EXPRESSED CONCERN ABOUT YOUR DRINKING OR SUGGESTED YOU CUT DOWN: NO
HOW MANY STANDARD DRINKS CONTAINING ALCOHOL DO YOU HAVE ON A TYPICAL DAY: PATIENT DOES NOT DRINK
HOW OFTEN DURING THE LAST YEAR HAVE YOU HAD A FEELING OF GUILT OR REMORSE AFTER DRINKING: NEVER
AUDIT TOTAL SCORE: 0
HOW OFTEN DO YOU HAVE SIX OR MORE DRINKS ON ONE OCCASION: NEVER
SKIP TO QUESTIONS 9-10: 1

## 2025-08-01 ASSESSMENT — ENCOUNTER SYMPTOMS
DEPRESSION: 0
OCCASIONAL FEELINGS OF UNSTEADINESS: 0
LOSS OF SENSATION IN FEET: 0

## 2025-08-01 ASSESSMENT — PATIENT HEALTH QUESTIONNAIRE - PHQ9
2. FEELING DOWN, DEPRESSED OR HOPELESS: NOT AT ALL
SUM OF ALL RESPONSES TO PHQ9 QUESTIONS 1 AND 2: 0
1. LITTLE INTEREST OR PLEASURE IN DOING THINGS: NOT AT ALL

## 2025-08-01 ASSESSMENT — PAIN DESCRIPTION - DESCRIPTORS: DESCRIPTORS: STABBING;SHARP;ACHING

## 2025-08-01 ASSESSMENT — PAIN - FUNCTIONAL ASSESSMENT: PAIN_FUNCTIONAL_ASSESSMENT: 0-10

## 2025-08-01 ASSESSMENT — PAIN SCALES - GENERAL
PAINLEVEL_OUTOF10: 7
PAINLEVEL_OUTOF10: 7

## 2025-08-01 NOTE — PATIENT INSTRUCTIONS
Thank you for coming to see us today!     Continue to use tylenol for pain control.   Rest, ice and elevate and remember to do exercises.   We recommend that you use padding on your left side at night for comfort with sleeping.     Follow up  as needed.

## 2025-08-04 ENCOUNTER — TELEPHONE (OUTPATIENT)
Dept: ORTHOPEDIC SURGERY | Facility: CLINIC | Age: 70
End: 2025-08-04
Payer: COMMERCIAL

## 2025-08-04 NOTE — TELEPHONE ENCOUNTER
Patient brother called , who sates he is her medical POA.  He would like to know the results of the visit as she told him you could not help her.

## 2025-08-06 NOTE — TELEPHONE ENCOUNTER
I returned a call to this patient's brother and power of , Anurag, at [phone].  We had a long detailed discussion regarding this patient's recent office visit with me on 8-1-2025.  I reviewed my findings and recommendations.  I told the patient's brother that I had given the patient the options of both removal of all or part of the internal fixation from her left hip and also the option of nonoperative treatment with risks and benefits of each option.  I again reviewed this patient's multiple medical issues with the patient's brother.  I instructed the brother that it was my opinion that the best option was avoiding operative treatment at this time because of the risks of operative treatment and the patient's brother strongly agreed and was satisfied with our conversation.